# Patient Record
Sex: MALE | Race: WHITE | NOT HISPANIC OR LATINO | Employment: FULL TIME | URBAN - METROPOLITAN AREA
[De-identification: names, ages, dates, MRNs, and addresses within clinical notes are randomized per-mention and may not be internally consistent; named-entity substitution may affect disease eponyms.]

---

## 2018-05-23 ENCOUNTER — OFFICE VISIT (OUTPATIENT)
Dept: FAMILY MEDICINE CLINIC | Facility: CLINIC | Age: 23
End: 2018-05-23
Payer: COMMERCIAL

## 2018-05-23 VITALS
WEIGHT: 129.4 LBS | HEIGHT: 68 IN | DIASTOLIC BLOOD PRESSURE: 62 MMHG | TEMPERATURE: 98.6 F | BODY MASS INDEX: 19.61 KG/M2 | HEART RATE: 72 BPM | SYSTOLIC BLOOD PRESSURE: 94 MMHG

## 2018-05-23 DIAGNOSIS — B37.2 CUTANEOUS CANDIDIASIS: ICD-10-CM

## 2018-05-23 DIAGNOSIS — K21.00 GASTROESOPHAGEAL REFLUX DISEASE WITH ESOPHAGITIS: Primary | ICD-10-CM

## 2018-05-23 DIAGNOSIS — R49.0 HOARSENESS: ICD-10-CM

## 2018-05-23 DIAGNOSIS — K64.4 EXTERNAL HEMORRHOIDS: ICD-10-CM

## 2018-05-23 PROCEDURE — 99203 OFFICE O/P NEW LOW 30 MIN: CPT | Performed by: FAMILY MEDICINE

## 2018-05-23 PROCEDURE — 3008F BODY MASS INDEX DOCD: CPT | Performed by: FAMILY MEDICINE

## 2018-05-23 RX ORDER — PANTOPRAZOLE SODIUM 40 MG/1
40 TABLET, DELAYED RELEASE ORAL DAILY
Qty: 30 TABLET | Refills: 1 | Status: SHIPPED | OUTPATIENT
Start: 2018-05-23 | End: 2019-07-03 | Stop reason: ALTCHOICE

## 2018-05-23 RX ORDER — CLOTRIMAZOLE AND BETAMETHASONE DIPROPIONATE 10; .64 MG/G; MG/G
CREAM TOPICAL 2 TIMES DAILY
Qty: 60 G | Refills: 3 | Status: SHIPPED | OUTPATIENT
Start: 2018-05-23 | End: 2019-07-03 | Stop reason: ALTCHOICE

## 2018-05-23 NOTE — PROGRESS NOTES
Patient ID: Nelly Heaton is a 26 y o  male  HPI: 26 y o male presents to get establishe  He has been experiencing daily acid reflux for past 3 mos and does complain of some intermittent horaseness  He has a red, itchy rash under right axilla and  an irritated external hemorrhoid without any pain or blood  SUBJECTIVE    Family History   Problem Relation Age of Onset    Diabetes Mother     Diabetes Maternal Grandmother      Social History     Social History    Marital status: Single     Spouse name: N/A    Number of children: N/A    Years of education: N/A     Occupational History    Not on file  Social History Main Topics    Smoking status: Current Every Day Smoker    Smokeless tobacco: Not on file      Comment: daily use of cigars    Alcohol use Yes    Drug use: Yes     Types: Marijuana    Sexual activity: Not on file     Other Topics Concern    Not on file     Social History Narrative    No narrative on file     History reviewed  No pertinent past medical history  Past Surgical History:   Procedure Laterality Date    WISDOM TOOTH EXTRACTION       No Known Allergies  No current outpatient prescriptions on file      Review of Systems  Constitutional:     Denies fever, chills ,fatigue ,weakness ,weight loss, weight gain     ENT: Denies earache ,loss of hearing ,nosebleed, nasal discharge,nasal congestion ,sore throat +,hoarseness  Pulmonary: Denies shortness of breath ,cough  ,dyspnea on exertion, orthopnea  ,PND   Cardiovascular:  Denies bradycardia , tachycardia  ,palpations, lower extremity edema leg, claudication  Breast:  Denies new or changing breast lumps ,nipple discharge ,nipple changes  Abdomen:  Denies abdominal pain , anorexia , indigestion, nausea, vomiting, constipation, diarrhea+ acid reflux daily; + irritated externeral hemorrhoid  Musculoskeletal: Denies myalgias, arthralgias, joint swelling, joint stiffness , limb pain, limb swelling  Gu: denies dysuria, polyuria  Skin: Itchy right axillaryskin rash, skin lesion, skin wound, itching, dry skin  Neuro: Denies headache, numbness, tingling, confusion, loss of consciousness, dizziness, vertigo  Psychiatric: Denies feelings of depression, suicidal ideation, anxiety, sleep disturbances    OBJECTIVE    Constitutional:   NAD, well appearing and well nourished      ENT:   Conjunctiva and lids: no injection, edema, or discharge     Pupils and iris: KUNAL bilaterally    External inspection of ears and nose: normal without deformities or discharge  Otoscopic exam: Canals patent without erythema  Nasal mucosa, septum and turbinates: Normal or edema or discharge         Oropharynx:  Moist mucosa, normal tongue and tonsils without lesions  No erythema        Pulmonary:Respiratory effort normal rate and rhythm, no increased work of breathing  Auscultation of lungs:  Clear bilaterally with no adventitious breath sounds       Cardiovascular: regular rate and rhythm, S1 and S2, no murmur, no edema and/or varicosities of LE      Abdomen: Soft and non-distended     Positive bowel sounds      No heptomegaly or splenomegaly  ; small external hemorroid at the 6:00 position    Gu: no suprapubic tenderness or CVA tenderness, no urethral discharge  Lymphatic:  No anterior or posterior cervical lymphadenopathy         Musculoskeletal:  Gait and station: Normal gait      Digits and nails normal without clubbing or cyanosis       Inspection/palpation of joints, bones, and muscles:  No joint tenderness, swelling, full active and passive range of motion       Skin: Normal skin turgor and right erythematous macerated axillar rash     Neuro:      Normal reflexes       Psych:   alert and oriented to person, place and time     normal mood and affect       Assessment/Plan:Diagnoses and all orders for this visit:    Gastroesophageal reflux disease with esophagitis  -     pantoprazole (PROTONIX) 40 mg tablet;  Take 1 tablet (40 mg total) by mouth daily for 30 days    Cutaneous candidiasis  -     clotrimazole-betamethasone (LOTRISONE) 1-0 05 % cream; Apply topically 2 (two) times a day    External hemorrhoids  -     pramoxine (PROCTOFOAM) 1 % foam; Insert 1 application into the rectum every 4 (four) hours as needed for hemorrhoids    Hoarseness  -     Ambulatory Referral to Otolaryngology; Future        Reviewed with patient plan to treat with as above      Patient instructed to call in 72 hours if not feeling better or if symptoms worsen

## 2018-05-24 ENCOUNTER — TELEPHONE (OUTPATIENT)
Dept: FAMILY MEDICINE CLINIC | Facility: CLINIC | Age: 23
End: 2018-05-24

## 2019-07-03 ENCOUNTER — OFFICE VISIT (OUTPATIENT)
Dept: FAMILY MEDICINE CLINIC | Facility: CLINIC | Age: 24
End: 2019-07-03

## 2019-07-03 VITALS
HEIGHT: 69 IN | BODY MASS INDEX: 18.16 KG/M2 | DIASTOLIC BLOOD PRESSURE: 70 MMHG | TEMPERATURE: 98.1 F | SYSTOLIC BLOOD PRESSURE: 108 MMHG | WEIGHT: 122.6 LBS | HEART RATE: 72 BPM

## 2019-07-03 DIAGNOSIS — N32.81 OVERACTIVE BLADDER: ICD-10-CM

## 2019-07-03 DIAGNOSIS — L50.9 URTICARIA: ICD-10-CM

## 2019-07-03 DIAGNOSIS — K64.4 EXTERNAL HEMORRHOIDS: ICD-10-CM

## 2019-07-03 DIAGNOSIS — K21.00 GASTROESOPHAGEAL REFLUX DISEASE WITH ESOPHAGITIS: Primary | ICD-10-CM

## 2019-07-03 PROCEDURE — 99214 OFFICE O/P EST MOD 30 MIN: CPT | Performed by: FAMILY MEDICINE

## 2019-07-03 RX ORDER — TOLTERODINE 4 MG/1
4 CAPSULE, EXTENDED RELEASE ORAL DAILY
Qty: 30 CAPSULE | Refills: 1 | Status: SHIPPED | OUTPATIENT
Start: 2019-07-03 | End: 2019-09-13 | Stop reason: SDUPTHER

## 2019-07-03 RX ORDER — PANTOPRAZOLE SODIUM 40 MG/1
40 TABLET, DELAYED RELEASE ORAL
Qty: 30 TABLET | Refills: 5 | Status: SHIPPED | OUTPATIENT
Start: 2019-07-03 | End: 2020-01-15

## 2019-07-03 NOTE — PROGRESS NOTES
BMI Counseling: Body mass index is 18 1 kg/m²  Discussed the patient's BMI with him  The BMI is above average  BMI counseling and education was provided to the patient  Nutrition recommendations include reducing portion sizes and decreasing overall calorie intake  Patient ID: Cecelia Gan is a 27 y o  male  HPI: 27 y o male presenting withdaily acid reflux despite trying pepcid and otc prilosec  He denies any black or red bowel movements  He also needs a cream for external hemorrhoids  In addition he spontaneously breaks out in hives and would like to take something to stop this from happening  He also complains of chronic urinary frequency and urgency  Often he gets up 6 times per night to urinate  He denies any flank pain  Or dysuria  SUBJECTIVE    Family History   Problem Relation Age of Onset    Diabetes Mother     Diabetes Maternal Grandmother      Social History     Socioeconomic History    Marital status: Single     Spouse name: Not on file    Number of children: Not on file    Years of education: Not on file    Highest education level: Not on file   Occupational History    Not on file   Social Needs    Financial resource strain: Not on file    Food insecurity:     Worry: Not on file     Inability: Not on file    Transportation needs:     Medical: Not on file     Non-medical: Not on file   Tobacco Use    Smoking status: Current Every Day Smoker    Smokeless tobacco: Never Used    Tobacco comment: daily use of cigars   Substance and Sexual Activity    Alcohol use:  Yes    Drug use: Yes     Types: Marijuana    Sexual activity: Not on file   Lifestyle    Physical activity:     Days per week: Not on file     Minutes per session: Not on file    Stress: Not on file   Relationships    Social connections:     Talks on phone: Not on file     Gets together: Not on file     Attends [employer] service: Not on file     Active member of club or organization: Not on file     Attends meetings of clubs or organizations: Not on file     Relationship status: Not on file    Intimate partner violence:     Fear of current or ex partner: Not on file     Emotionally abused: Not on file     Physically abused: Not on file     Forced sexual activity: Not on file   Other Topics Concern    Not on file   Social History Narrative    Not on file     No past medical history on file    Past Surgical History:   Procedure Laterality Date    WISDOM TOOTH EXTRACTION       No Known Allergies    Current Outpatient Medications:     Hydrocortisone 1 % CREA, Insert into the rectum 3 (three) times a day as needed (hemorrhoidal irritation) for up to 10 days, Disp: 28 4 g, Rfl: 3    pantoprazole (PROTONIX) 40 mg tablet, Take 1 tablet (40 mg total) by mouth daily before breakfast, Disp: 30 tablet, Rfl: 5    tolterodine (DETROL LA) 4 mg 24 hr capsule, Take 1 capsule (4 mg total) by mouth daily, Disp: 30 capsule, Rfl: 1    Review of Systems  Constitutional:     Denies fever, chills ,fatigue ,weakness ,weight loss, weight gain     ENT: Denies earache ,loss of hearing ,nosebleed, nasal discharge,nasal congestion ,sore throat ,hoarseness  Pulmonary: Denies shortness of breath ,cough  ,dyspnea on exertion, orthopnea  ,PND   Cardiovascular:  Denies bradycardia , tachycardia  ,palpations, lower extremity edema leg, claudication  Breast:  Denies new or changing breast lumps ,nipple discharge ,nipple changes  Abdomen:  Denies abdominal pain , anorexia , indigestion, nausea, vomiting, constipation, diarrhea+GERD+ external hemorrhoids  Musculoskeletal: Denies myalgias, arthralgias, joint swelling, joint stiffness , limb pain, limb swelling+ frequent urticaria  Gu: denies dysuria,+urgency and  polyuria  Skin: Denies skin rash, skin lesion, skin wound, itching, dry skin+ intermittent urticaria  Neuro: Denies headache, numbness, tingling, confusion, loss of consciousness, dizziness, vertigo  Psychiatric: Denies feelings of depression, suicidal ideation, anxiety, sleep disturbances    OBJECTIVE  /70   Pulse 72   Temp 98 1 °F (36 7 °C)   Ht 5' 9" (1 753 m)   Wt 55 6 kg (122 lb 9 6 oz)   BMI 18 10 kg/m²   Constitutional:   NAD, well appearing and well nourished      ENT:   Conjunctiva and lids: no injection, edema, or discharge     Pupils and iris: KUNAL bilaterally    External inspection of ears and nose: normal without deformities or discharge  Otoscopic exam: Canals patent without erythema  Nasal mucosa, septum and turbinates: Normal or edema or discharge         Oropharynx:  Moist mucosa, normal tongue and tonsils without lesions  No erythema        Pulmonary:Respiratory effort normal rate and rhythm, no increased work of breathing  Auscultation of lungs:  Clear bilaterally with no adventitious breath sounds       Cardiovascular: regular rate and rhythm, S1 and S2, no murmur, no edema and/or varicosities of LE      Abdomen: Soft and non-distended     Positive bowel sounds      No heptomegaly or splenomegaly      Gu: no suprapubic tenderness or CVA tenderness, no urethral discharge  Lymphatic:  No anterior or posterior cervical lymphadenopathy         Musculoskeletal:  Gait and station: Normal gait      Digits and nails normal without clubbing or cyanosis       Inspection/palpation of joints, bones, and muscles:  No joint tenderness, swelling, full active and passive range of motion       Skin: Normal skin turgor and no rashes      Neuro:      Normal reflexes      Psych:   alert and oriented to person, place and time     normal mood and affect       Assessment/Plan:Diagnoses and all orders for this visit:    Gastroesophageal reflux disease with esophagitis  -     pantoprazole (PROTONIX) 40 mg tablet; Take 1 tablet (40 mg total) by mouth daily before breakfast    Urticaria    Overactive bladder  -     tolterodine (DETROL LA) 4 mg 24 hr capsule;  Take 1 capsule (4 mg total) by mouth daily    External hemorrhoids  - Hydrocortisone 1 % CREA; Insert into the rectum 3 (three) times a day as needed (hemorrhoidal irritation) for up to 10 days        Reviewed with patient plan to treat with above plan     Patient instructed to call in 72 hours if not feeling better or if symptoms worsen

## 2019-09-13 DIAGNOSIS — N32.81 OVERACTIVE BLADDER: ICD-10-CM

## 2019-09-13 RX ORDER — TOLTERODINE 4 MG/1
CAPSULE, EXTENDED RELEASE ORAL
Qty: 30 CAPSULE | Refills: 1 | Status: SHIPPED | OUTPATIENT
Start: 2019-09-13 | End: 2019-11-29 | Stop reason: SDUPTHER

## 2019-11-29 DIAGNOSIS — N32.81 OVERACTIVE BLADDER: ICD-10-CM

## 2019-11-29 RX ORDER — TOLTERODINE 4 MG/1
CAPSULE, EXTENDED RELEASE ORAL
Qty: 30 CAPSULE | Refills: 1 | Status: SHIPPED | OUTPATIENT
Start: 2019-11-29 | End: 2021-01-13 | Stop reason: ALTCHOICE

## 2020-01-03 ENCOUNTER — TELEPHONE (OUTPATIENT)
Dept: UROLOGY | Facility: MEDICAL CENTER | Age: 25
End: 2020-01-03

## 2020-01-03 ENCOUNTER — DOCUMENTATION (OUTPATIENT)
Dept: UROLOGY | Facility: MEDICAL CENTER | Age: 25
End: 2020-01-03

## 2020-01-03 ENCOUNTER — OFFICE VISIT (OUTPATIENT)
Dept: UROLOGY | Facility: MEDICAL CENTER | Age: 25
End: 2020-01-03
Payer: COMMERCIAL

## 2020-01-03 VITALS
HEIGHT: 69 IN | SYSTOLIC BLOOD PRESSURE: 124 MMHG | WEIGHT: 121 LBS | BODY MASS INDEX: 17.92 KG/M2 | DIASTOLIC BLOOD PRESSURE: 80 MMHG

## 2020-01-03 DIAGNOSIS — R30.0 DYSURIA: Primary | ICD-10-CM

## 2020-01-03 LAB
SL AMB  POCT GLUCOSE, UA: NORMAL
SL AMB LEUKOCYTE ESTERASE,UA: NORMAL
SL AMB POCT BILIRUBIN,UA: NORMAL
SL AMB POCT BLOOD,UA: NORMAL
SL AMB POCT CLARITY,UA: CLEAR
SL AMB POCT COLOR,UA: YELLOW
SL AMB POCT KETONES,UA: NORMAL
SL AMB POCT NITRITE,UA: NORMAL
SL AMB POCT PH,UA: 8.5
SL AMB POCT SPECIFIC GRAVITY,UA: 1.02
SL AMB POCT URINE PROTEIN: NORMAL
SL AMB POCT UROBILINOGEN: 0.2

## 2020-01-03 PROCEDURE — 99204 OFFICE O/P NEW MOD 45 MIN: CPT | Performed by: UROLOGY

## 2020-01-03 PROCEDURE — 3008F BODY MASS INDEX DOCD: CPT | Performed by: UROLOGY

## 2020-01-03 PROCEDURE — 81003 URINALYSIS AUTO W/O SCOPE: CPT | Performed by: UROLOGY

## 2020-01-03 RX ORDER — CETIRIZINE HYDROCHLORIDE 10 MG/1
10 TABLET ORAL DAILY
COMMUNITY
End: 2022-04-15 | Stop reason: SDUPTHER

## 2020-01-03 NOTE — PROGRESS NOTES
HISTORY:    Evaluation for 16-year-old young man who has had a for I would of urinary frequency complaints for at least two years now  Recent issue is slight dysuria about five weeks ago the just last one day  Then 10 days ago, severe dysuria which last for days in days  Went to urgent care at [facility], culture was negative, renal and bladder ultrasound was negative, given cephalexin which did not really seem to change much  Then, three days ago the burning at completely resolved and he is not having that at all now  Has been on tolterodine for urinary frequency for over a year, he says he is not sure how much it helps  Has some days very goes every 30-60 minutes, nocturia times 2-4 sometimes  Says he uses marijuana every day to help me sleep    Was using Xanax very often during college, he says that made him not urinate very well  ASSESSMENT / PLAN:    1  Unclear cause of frequency and the recent dysuria  Symptoms seem to clear up quite quickly a few days ago  2  Emptying well on ultrasound, unclear cause of his sense of needing to urinate quite often    3  I do not know if the daily marijuana is contribute to his problem, but he does seem to have a reliance on recreational meds  4  Because the ultrasound is normal, his bladder has good emptying, and the negative culture, I do not think he needs any further urology evaluation  I do not think cystoscopy would be of any value here  If the tolterodine seems to help him, he can continue that  Follow-up p r n  The following portions of the patient's history were reviewed and updated as appropriate: allergies, current medications, past family history, past medical history, past social history, past surgical history and problem list     Review of Systems   All other systems reviewed and are negative  Objective:     Physical Exam   Constitutional: He appears well-developed and well-nourished     Genitourinary:   Genitourinary Comments: Penis testes normal    Abdomen soft nontender         No results found for: PSA]  No results found for: BUN  No results found for: CREATININE  No components found for: CBC      There is no problem list on file for this patient  Diagnoses and all orders for this visit:    Dysuria  -     POCT urine dip auto non-scope    Other orders  -     cetirizine (ZyrTEC) 10 mg tablet; Take 10 mg by mouth daily           Patient ID: Willie Nunez is a 27 y o  male  Current Outpatient Medications:     cetirizine (ZyrTEC) 10 mg tablet, Take 10 mg by mouth daily, Disp: , Rfl:     Hydrocortisone 1 % CREA, Insert into the rectum 3 (three) times a day as needed (hemorrhoidal irritation) for up to 10 days, Disp: 28 4 g, Rfl: 3    pantoprazole (PROTONIX) 40 mg tablet, Take 1 tablet (40 mg total) by mouth daily before breakfast, Disp: 30 tablet, Rfl: 5    tolterodine (DETROL LA) 4 mg 24 hr capsule, take 1 capsule by mouth daily, Disp: 30 capsule, Rfl: 1    History reviewed  No pertinent past medical history      Past Surgical History:   Procedure Laterality Date    WISDOM TOOTH EXTRACTION         Social History

## 2020-01-15 DIAGNOSIS — K21.00 GASTROESOPHAGEAL REFLUX DISEASE WITH ESOPHAGITIS: ICD-10-CM

## 2020-01-15 RX ORDER — PANTOPRAZOLE SODIUM 40 MG/1
TABLET, DELAYED RELEASE ORAL
Qty: 30 TABLET | Refills: 0 | Status: SHIPPED | OUTPATIENT
Start: 2020-01-15 | End: 2020-02-20

## 2020-02-19 DIAGNOSIS — K21.00 GASTROESOPHAGEAL REFLUX DISEASE WITH ESOPHAGITIS: ICD-10-CM

## 2020-02-20 RX ORDER — PANTOPRAZOLE SODIUM 40 MG/1
TABLET, DELAYED RELEASE ORAL
Qty: 30 TABLET | Refills: 0 | Status: SHIPPED | OUTPATIENT
Start: 2020-02-20 | End: 2020-04-23

## 2020-04-23 DIAGNOSIS — K21.00 GASTROESOPHAGEAL REFLUX DISEASE WITH ESOPHAGITIS: ICD-10-CM

## 2020-04-23 RX ORDER — PANTOPRAZOLE SODIUM 40 MG/1
TABLET, DELAYED RELEASE ORAL
Qty: 30 TABLET | Refills: 0 | Status: SHIPPED | OUTPATIENT
Start: 2020-04-23 | End: 2020-06-03

## 2020-06-03 DIAGNOSIS — K21.00 GASTROESOPHAGEAL REFLUX DISEASE WITH ESOPHAGITIS: ICD-10-CM

## 2020-06-03 RX ORDER — PANTOPRAZOLE SODIUM 40 MG/1
TABLET, DELAYED RELEASE ORAL
Qty: 30 TABLET | Refills: 0 | Status: SHIPPED | OUTPATIENT
Start: 2020-06-03 | End: 2021-01-13 | Stop reason: SDUPTHER

## 2021-01-13 ENCOUNTER — OFFICE VISIT (OUTPATIENT)
Dept: FAMILY MEDICINE CLINIC | Facility: CLINIC | Age: 26
End: 2021-01-13
Payer: COMMERCIAL

## 2021-01-13 ENCOUNTER — TELEPHONE (OUTPATIENT)
Dept: FAMILY MEDICINE CLINIC | Facility: CLINIC | Age: 26
End: 2021-01-13

## 2021-01-13 VITALS
BODY MASS INDEX: 19.11 KG/M2 | SYSTOLIC BLOOD PRESSURE: 122 MMHG | HEART RATE: 64 BPM | HEIGHT: 69 IN | DIASTOLIC BLOOD PRESSURE: 80 MMHG | WEIGHT: 129 LBS | OXYGEN SATURATION: 99 % | TEMPERATURE: 98.5 F

## 2021-01-13 DIAGNOSIS — R35.89 POLYURIA: ICD-10-CM

## 2021-01-13 DIAGNOSIS — M25.562 ACUTE PAIN OF BOTH KNEES: ICD-10-CM

## 2021-01-13 DIAGNOSIS — M76.30 ILIOTIBIAL BAND SYNDROME, UNSPECIFIED LATERALITY: ICD-10-CM

## 2021-01-13 DIAGNOSIS — K21.00 GASTROESOPHAGEAL REFLUX DISEASE WITH ESOPHAGITIS: ICD-10-CM

## 2021-01-13 DIAGNOSIS — Z00.00 ANNUAL PHYSICAL EXAM: Primary | ICD-10-CM

## 2021-01-13 DIAGNOSIS — N32.81 OVERACTIVE BLADDER: ICD-10-CM

## 2021-01-13 DIAGNOSIS — K64.4 EXTERNAL HEMORRHOID: ICD-10-CM

## 2021-01-13 DIAGNOSIS — M25.561 ACUTE PAIN OF BOTH KNEES: ICD-10-CM

## 2021-01-13 LAB
SL AMB  POCT GLUCOSE, UA: NEGATIVE
SL AMB LEUKOCYTE ESTERASE,UA: NEGATIVE
SL AMB POCT BILIRUBIN,UA: NEGATIVE
SL AMB POCT BLOOD,UA: NEGATIVE
SL AMB POCT CLARITY,UA: CLEAR
SL AMB POCT COLOR,UA: YELLOW
SL AMB POCT KETONES,UA: NEGATIVE
SL AMB POCT NITRITE,UA: NEGATIVE
SL AMB POCT PH,UA: 5
SL AMB POCT SPECIFIC GRAVITY,UA: 1.02
SL AMB POCT URINE PROTEIN: NEGATIVE
SL AMB POCT UROBILINOGEN: 0.2

## 2021-01-13 PROCEDURE — 99395 PREV VISIT EST AGE 18-39: CPT | Performed by: FAMILY MEDICINE

## 2021-01-13 PROCEDURE — 3008F BODY MASS INDEX DOCD: CPT | Performed by: FAMILY MEDICINE

## 2021-01-13 PROCEDURE — 99215 OFFICE O/P EST HI 40 MIN: CPT | Performed by: FAMILY MEDICINE

## 2021-01-13 PROCEDURE — 3725F SCREEN DEPRESSION PERFORMED: CPT | Performed by: FAMILY MEDICINE

## 2021-01-13 PROCEDURE — 1036F TOBACCO NON-USER: CPT | Performed by: FAMILY MEDICINE

## 2021-01-13 PROCEDURE — 81002 URINALYSIS NONAUTO W/O SCOPE: CPT | Performed by: FAMILY MEDICINE

## 2021-01-13 RX ORDER — PANTOPRAZOLE SODIUM 40 MG/1
40 TABLET, DELAYED RELEASE ORAL DAILY
Qty: 90 TABLET | Refills: 3 | Status: SHIPPED | OUTPATIENT
Start: 2021-01-13 | End: 2022-01-10

## 2021-01-13 RX ORDER — DIAPER,BRIEF,INFANT-TODD,DISP
EACH MISCELLANEOUS 3 TIMES DAILY
Qty: 30 G | Refills: 1 | Status: SHIPPED | OUTPATIENT
Start: 2021-01-13 | End: 2021-10-14 | Stop reason: ALTCHOICE

## 2021-01-13 RX ORDER — SOLIFENACIN SUCCINATE 10 MG/1
10 TABLET, FILM COATED ORAL DAILY
Qty: 30 TABLET | Refills: 1 | Status: SHIPPED | OUTPATIENT
Start: 2021-01-13 | End: 2021-01-18

## 2021-01-13 RX ORDER — FAMOTIDINE 40 MG/1
40 TABLET, FILM COATED ORAL
Qty: 90 TABLET | Refills: 2 | Status: SHIPPED | OUTPATIENT
Start: 2021-01-13 | End: 2022-02-04

## 2021-01-13 RX ORDER — PREDNISONE 10 MG/1
TABLET ORAL
Qty: 26 TABLET | Refills: 0 | Status: SHIPPED | OUTPATIENT
Start: 2021-01-13 | End: 2021-10-14 | Stop reason: ALTCHOICE

## 2021-01-13 NOTE — PROGRESS NOTES
ADULT ANNUAL [address]    NAME: Tong Aly  AGE: 23 y o  SEX: male  : 1995     DATE: 2021     Assessment and Plan:     Problem List Items Addressed This Visit     None          Immunizations and preventive care screenings were discussed with patient today  Appropriate education was printed on patient's after visit summary  Counseling:  · Exercise: the importance of regular exercise/physical activity was discussed  Recommend exercise 3-5 times per week for at least 30 minutes  No follow-ups on file  Chief Complaint:     Chief Complaint   Patient presents with    Annual Exam    Knee Pain     right & left knee pain x 1 month    Urinary Frequency    Hemorrhoids    Heartburn      History of Present Illness:     Adult Annual Physical   Patient here for a comprehensive physical exam  The patient reports no problems  Diet and Physical Activity  · Diet/Nutrition: well balanced diet  · Exercise: no formal exercise  Depression Screening  PHQ-9 Depression Screening    PHQ-9:   Frequency of the following problems over the past two weeks:      Little interest or pleasure in doing things: 0 - not at all  Feeling down, depressed, or hopeless: 0 - not at all  PHQ-2 Score: 0       General Health  · Sleep: sleeps well  · Hearing: normal - bilateral   · Vision: no vision problems  · Dental: regular dental visits  [facility]  · History of STDs?: no      Review of Systems:     Review of Systems   Constitutional: Negative for appetite change, chills and fever  HENT: Negative for ear pain, facial swelling, rhinorrhea, sinus pain, sore throat and trouble swallowing  Eyes: Negative for discharge and redness  Respiratory: Negative for chest tightness, shortness of breath and wheezing  Cardiovascular: Negative for chest pain and palpitations     Gastrointestinal: Negative for abdominal pain, diarrhea, nausea and vomiting  Endocrine: Negative for polyuria  Genitourinary: Negative for dysuria and urgency  Musculoskeletal: Negative for arthralgias and back pain  Skin: Negative for rash  Neurological: Negative for dizziness, weakness and headaches  Hematological: Negative for adenopathy  Psychiatric/Behavioral: Negative for behavioral problems, confusion and sleep disturbance  All other systems reviewed and are negative  Past Medical History:     No past medical history on file     Past Surgical History:     Past Surgical History:   Procedure Laterality Date    WISDOM TOOTH EXTRACTION        Social History:        Social History     Socioeconomic History    Marital status: Single     Spouse name: None    Number of children: None    Years of education: None    Highest education level: None   Occupational History    None   Social Needs    Financial resource strain: None    Food insecurity     Worry: None     Inability: None    Transportation needs     Medical: None     Non-medical: None   Tobacco Use    Smoking status: Former Smoker    Smokeless tobacco: Never Used   Substance and Sexual Activity    Alcohol use: Yes     Frequency: 2-4 times a month     Drinks per session: 1 or 2     Binge frequency: Never    Drug use: Yes     Types: Marijuana    Sexual activity: Yes     Partners: Female   Lifestyle    Physical activity     Days per week: None     Minutes per session: None    Stress: None   Relationships    Social connections     Talks on phone: None     Gets together: None     Attends Baptism service: None     Active member of club or organization: None     Attends meetings of clubs or organizations: None     Relationship status: None    Intimate partner violence     Fear of current or ex partner: None     Emotionally abused: None     Physically abused: None     Forced sexual activity: None   Other Topics Concern    None   Social History Narrative    None      Family History: Family History   Problem Relation Age of Onset    Diabetes Mother     Diabetes Maternal Grandmother       Current Medications:     Current Outpatient Medications   Medication Sig Dispense Refill    cetirizine (ZyrTEC) 10 mg tablet Take 10 mg by mouth daily      pantoprazole (PROTONIX) 40 mg tablet take 1 tablet by mouth daily 30 tablet 0     No current facility-administered medications for this visit  Allergies:     No Known Allergies   Physical Exam:     /80   Pulse 64   Temp 98 5 °F (36 9 °C)   Ht 5' 9" (1 753 m)   Wt 58 5 kg (129 lb)   SpO2 99%   BMI 19 05 kg/m²     Physical Exam  Vitals signs and nursing note reviewed  Constitutional:       General: He is not in acute distress  Appearance: Normal appearance  He is well-developed  He is not ill-appearing or diaphoretic  HENT:      Head: Normocephalic and atraumatic  Right Ear: Tympanic membrane, ear canal and external ear normal       Left Ear: Tympanic membrane, ear canal and external ear normal       Nose: Nose normal  No congestion or rhinorrhea  Mouth/Throat:      Mouth: Mucous membranes are moist       Pharynx: Oropharynx is clear  No posterior oropharyngeal erythema  Eyes:      General:         Right eye: No discharge  Left eye: No discharge  Extraocular Movements: Extraocular movements intact  Conjunctiva/sclera: Conjunctivae normal       Pupils: Pupils are equal, round, and reactive to light  Neck:      Musculoskeletal: Normal range of motion and neck supple  No neck rigidity  Vascular: No carotid bruit  Cardiovascular:      Rate and Rhythm: Normal rate and regular rhythm  Pulses: Normal pulses  Heart sounds: Normal heart sounds  No murmur  Pulmonary:      Effort: Pulmonary effort is normal  No respiratory distress  Breath sounds: Normal breath sounds  No wheezing or rhonchi  Abdominal:      General: Abdomen is flat  Bowel sounds are normal  There is no distension  Palpations: Abdomen is soft  There is no mass  Tenderness: There is no abdominal tenderness  Musculoskeletal: Normal range of motion  General: No swelling or deformity  Right lower leg: No edema  Left lower leg: No edema  Lymphadenopathy:      Cervical: No cervical adenopathy  Skin:     General: Skin is warm and dry  Capillary Refill: Capillary refill takes less than 2 seconds  Coloration: Skin is not jaundiced  Findings: No bruising, erythema or rash  Neurological:      General: No focal deficit present  Mental Status: He is alert and oriented to person, place, and time  Cranial Nerves: No cranial nerve deficit  Sensory: No sensory deficit  Gait: Gait normal       Deep Tendon Reflexes: Reflexes normal    Psychiatric:         Mood and Affect: Mood normal          Behavior: Behavior normal          Thought Content:  Thought content normal          Judgment: Judgment normal           Maria Ines [address]

## 2021-01-13 NOTE — TELEPHONE ENCOUNTER
Patient called his insurance company  He spoke to someone there who told him that they don't cover Vesicare, but they aren't allowed to tell him covered alternatives

## 2021-01-13 NOTE — PATIENT INSTRUCTIONS

## 2021-01-18 DIAGNOSIS — N32.81 OVERACTIVE BLADDER: ICD-10-CM

## 2021-01-18 RX ORDER — SOLIFENACIN SUCCINATE 10 MG/1
TABLET, FILM COATED ORAL
Qty: 30 TABLET | Refills: 1 | Status: SHIPPED | OUTPATIENT
Start: 2021-01-18 | End: 2021-01-19

## 2021-01-19 DIAGNOSIS — N32.81 OAB (OVERACTIVE BLADDER): Primary | ICD-10-CM

## 2021-01-19 RX ORDER — OXYBUTYNIN CHLORIDE 15 MG/1
15 TABLET, EXTENDED RELEASE ORAL
Qty: 30 TABLET | Refills: 3 | Status: SHIPPED | OUTPATIENT
Start: 2021-01-19 | End: 2021-10-14 | Stop reason: ALTCHOICE

## 2021-01-19 RX ORDER — SOLIFENACIN SUCCINATE 10 MG/1
TABLET, FILM COATED ORAL
Qty: 30 TABLET | Refills: 1 | Status: SHIPPED | OUTPATIENT
Start: 2021-01-19 | End: 2021-10-14 | Stop reason: ALTCHOICE

## 2021-01-19 NOTE — TELEPHONE ENCOUNTER
Call from [employer] the only other covered alternative is oxybutynin which he already failed on  It will require an authorization   I am going to try and start one on cover my meds

## 2021-01-19 NOTE — TELEPHONE ENCOUNTER
Katie Just so they denied the vesicare and I looked back in his chart  He was on tolterodine, not oxybutynin   He needs to try oxybutynin before they will approve Vesicare

## 2021-01-20 ENCOUNTER — SOCIAL WORK (OUTPATIENT)
Dept: BEHAVIORAL/MENTAL HEALTH CLINIC | Facility: CLINIC | Age: 26
End: 2021-01-20
Payer: COMMERCIAL

## 2021-01-20 DIAGNOSIS — F43.21 ADJUSTMENT DISORDER WITH DEPRESSED MOOD: Primary | ICD-10-CM

## 2021-01-20 PROCEDURE — 90834 PSYTX W PT 45 MINUTES: CPT | Performed by: SOCIAL WORKER

## 2021-01-20 NOTE — PSYCH
Assessment/Plan:      Diagnoses and all orders for this visit:    Adjustment disorder with depressed mood          Subjective:     Patient ID: Gabriella Simmons is a 27 y o  male presenting to this writer with ongoing depressed mood  Pt reports that he has been finding it hard to be motivated and is reporting that he feels on the edge of burnout  Pt reports that he lives with his mother and sister  Both are very supportive  Pt reports that he failed out of [address] after falling into Xanax abuse and Cocaine use- pt has not used these drugs since leaving College 3 years ago  Pt reports his grades were very poor and he was academically failing and unable to continue  Pt reports that he is now studying at [employer]  Pt reports that his grades are better, but he is feeling the burnout factor  Pt reports that he is leaving work and projects to the last minute  Pt also works at [employer] 30 hours a week  Pt states he enjoys the work, but the work commitment has been playing a role in the stress he is experiencing  Pt reports that he is going to consider reducing his hours a little to give him more wiggle room when it comes to assignments  Pt reports that most of the stress he is going through relates to college debt and the pressure of work/ education demands  Pt reports that his primary concern is time wasting and taking a long time to get started on projects  This writer did ADHD screening questions and pt scored 5 out of 6  This writer talked about the indicators of the pecense of ADHD in adults and in particular the disorganization and the difficulty in starting projects  Pt reports he can identify with a lot of the symptoms of ADHD in adults  This writer helped pt understand some of the things that he is going to be struggling with and will have to work extra hard on   This writer discussed with pt the importance of taking baby steps and rather than leaving things to the last minute, start the planning process as soon as an assignment is given and get the project done early rather than last minute and having multiple days of stress  This writer talked with pt about self care, mindfulness and other stress reducing techniques that can be helpful  Pt reports that he would like to come back in 2 weeks depending on cost of co-pay  HPI    Review of Systems      Objective:     Physical Exam  This writer spent 45 minutes with pt from 9am to 9:45  Appearance: casually dressed good eye contact; speech: normal pitch and normal volume; behavior: normal, thought process: logical and goal directed, thought content: denies SI/HI, perceptions: no delusions of AH/VH, mood: slightly anxious, affect: congruent, orientated x4, insight/judgement: good

## 2021-01-21 NOTE — PROGRESS NOTES
Patient ID: Jorge Suarez is a 27 y o  male  HPI: 27 y o male presents for multiple complaints  He has pain laterally on right lateral upper leg from hip to knee and is tender on palpation and going up steps and bending upper leg  He denies any injury  He also has chronic urinary frequency and was diagnosed with OAB in past and failed detrol in past, but wanted to make sure there was no sugar in his urine because his mother is a type 2 diabetic  In addition, he has bilateral knee pain and achiness without swelling, locking or instability  HE has an external hemorrhoid that is irritated and causes leakage /soiling in his underwear but no real pain or bleeding  Lastly, he has been experiencing acid reflux daily    He denies excessive caffiene intake, use of advil, etc       SUBJECTIVE    Family History   Problem Relation Age of Onset    Diabetes Mother     Diabetes Maternal Grandmother      Social History     Socioeconomic History    Marital status: Single     Spouse name: Not on file    Number of children: Not on file    Years of education: Not on file    Highest education level: Not on file   Occupational History    Not on file   Social Needs    Financial resource strain: Not on file    Food insecurity     Worry: Not on file     Inability: Not on file    Transportation needs     Medical: Not on file     Non-medical: Not on file   Tobacco Use    Smoking status: Former Smoker    Smokeless tobacco: Never Used   Substance and Sexual Activity    Alcohol use: Yes     Frequency: 2-4 times a month     Drinks per session: 1 or 2     Binge frequency: Never    Drug use: Yes     Types: Marijuana    Sexual activity: Yes     Partners: Female   Lifestyle    Physical activity     Days per week: Not on file     Minutes per session: Not on file    Stress: Not on file   Relationships    Social connections     Talks on phone: Not on file     Gets together: Not on file     Attends Holiness service: Not on file     Active member of club or organization: Not on file     Attends meetings of clubs or organizations: Not on file     Relationship status: Not on file    Intimate partner violence     Fear of current or ex partner: Not on file     Emotionally abused: Not on file     Physically abused: Not on file     Forced sexual activity: Not on file   Other Topics Concern    Not on file   Social History Narrative    Not on file     No past medical history on file    Past Surgical History:   Procedure Laterality Date    WISDOM TOOTH EXTRACTION       No Known Allergies    Current Outpatient Medications:     cetirizine (ZyrTEC) 10 mg tablet, Take 10 mg by mouth daily, Disp: , Rfl:     pantoprazole (PROTONIX) 40 mg tablet, Take 1 tablet (40 mg total) by mouth daily, Disp: 90 tablet, Rfl: 3    famotidine (PEPCID) 40 MG tablet, Take 1 tablet (40 mg total) by mouth daily at bedtime, Disp: 90 tablet, Rfl: 2    hydrocortisone 1 % cream, Apply topically 3 (three) times a day, Disp: 30 g, Rfl: 1    oxybutynin (DITROPAN XL) 15 MG 24 hr tablet, Take 1 tablet (15 mg total) by mouth daily at bedtime, Disp: 30 tablet, Rfl: 3    predniSONE 10 mg tablet, 3 tabs po bid x2 days, then 2 tabs po bid x2 days, then 1 tab bid x2 days, then 1 daily until done , Disp: 26 tablet, Rfl: 0    solifenacin (VESICARE) 10 MG tablet, TAKE 1 TABLET BY MOUTH ONCE DAILY, Disp: 30 tablet, Rfl: 1    Review of Systems  Constitutional:     Denies fever, chills ,fatigue ,weakness ,weight loss, weight gain     ENT: Denies earache ,loss of hearing ,nosebleed, nasal discharge,nasal congestion ,sore throat ,hoarseness  Pulmonary: Denies shortness of breath ,cough  ,dyspnea on exertion, orthopnea  ,PND   Cardiovascular:  Denies bradycardia , tachycardia  ,palpations, lower extremity edema leg, claudication  Breast:  Denies new or changing breast lumps ,nipple discharge ,nipple changes  Abdomen:  Denies abdominal pain , anorexia , nausea, vomiting, constipation, diarrhea+ external hemorrhoid, daily GERD  Musculoskeletal: Denies myalgias,+ bilateral knee pain and joint stiffness ,no swelling of knees, + right lateral upper leg pain without  limb swelling  Gu: denies dysuria,+ chronic  Polyuria and urgency  Skin: Denies skin rash, skin lesion, skin wound, itching, dry skin  Neuro: Denies headache, numbness, tingling, confusion, loss of consciousness, dizziness, vertigo  Psychiatric: Denies feelings of depression, suicidal ideation, anxiety, sleep disturbances    OBJECTIVE  /80   Pulse 64   Temp 98 5 °F (36 9 °C)   Ht 5' 9" (1 753 m)   Wt 58 5 kg (129 lb)   SpO2 99%   BMI 19 05 kg/m²   Constitutional:   NAD, well appearing and well nourished      ENT:   Conjunctiva and lids: no injection, edema, or discharge     Pupils and iris: KUNAL bilaterally    External inspection of ears and nose: normal without deformities or discharge  Otoscopic exam: Canals patent without erythema  Nasal mucosa, septum and turbinates: Normal or edema or discharge         Oropharynx:  Moist mucosa, normal tongue and tonsils without lesions  No erythema        Pulmonary:Respiratory effort normal rate and rhythm, no increased work of breathing  Auscultation of lungs:  Clear bilaterally with no adventitious breath sounds       Cardiovascular: regular rate and rhythm, S1 and S2, no murmur, no edema and/or varicosities of LE      Abdomen: Soft and non-distended     Positive bowel sounds      No heptomegaly or splenomegaly      Gu: no suprapubic tenderness or CVA tenderness, no urethral discharge  Lymphatic:  No anterior or posterior cervical lymphadenopathy         Musculoskeletal:  Gait and station: Normal gait      Digits and nails normal without clubbing or cyanosis       Inspection/palpation of joints, bones, and muscles:  + tenderness of right lateral thigh along right IT band ; full flexion and extension of knees without crepitace or swelling noted       Skin: Normal skin turgor and no rashes      Neuro:     Normal reflexes     Psych:   alert and oriented to person, place and time     normal mood and affect       Assessment/Plan:Diagnoses and all orders for this visit:    Annual physical exam    Iliotibial band syndrome, unspecified laterality  -     predniSONE 10 mg tablet; 3 tabs po bid x2 days, then 2 tabs po bid x2 days, then 1 tab bid x2 days, then 1 daily until done  Overactive bladder  -     Discontinue: solifenacin (VESICARE) 10 MG tablet; Take 1 tablet (10 mg total) by mouth daily    Polyuria  -     POCT urine dip    Acute pain of both knees  -     XR knee 4+ vw left injury; Future  -     XR knee 4+ vw right injury; Future    External hemorrhoid  -     hydrocortisone 1 % cream; Apply topically 3 (three) times a day    Gastroesophageal reflux disease with esophagitis  -     pantoprazole (PROTONIX) 40 mg tablet; Take 1 tablet (40 mg total) by mouth daily  -     famotidine (PEPCID) 40 MG tablet; Take 1 tablet (40 mg total) by mouth daily at bedtime        Reviewed with patient plan to treat with aboveplan      Patient instructed to call in 72 hours if not feeling better or if symptoms worsen

## 2021-01-27 ENCOUNTER — HOSPITAL ENCOUNTER (OUTPATIENT)
Dept: RADIOLOGY | Facility: HOSPITAL | Age: 26
Discharge: HOME | End: 2021-01-27
Payer: COMMERCIAL

## 2021-01-27 DIAGNOSIS — M25.561 ACUTE PAIN OF BOTH KNEES: ICD-10-CM

## 2021-01-27 DIAGNOSIS — M25.562 ACUTE PAIN OF BOTH KNEES: ICD-10-CM

## 2021-01-27 PROCEDURE — 73564 X-RAY EXAM KNEE 4 OR MORE: CPT

## 2021-05-06 ENCOUNTER — IMMUNIZATIONS (OUTPATIENT)
Dept: FAMILY MEDICINE CLINIC | Facility: HOSPITAL | Age: 26
End: 2021-05-06

## 2021-05-06 DIAGNOSIS — Z23 ENCOUNTER FOR IMMUNIZATION: Primary | ICD-10-CM

## 2021-05-06 PROCEDURE — 0001A SARS-COV-2 / COVID-19 MRNA VACCINE (PFIZER-BIONTECH) 30 MCG: CPT

## 2021-05-06 PROCEDURE — 91300 SARS-COV-2 / COVID-19 MRNA VACCINE (PFIZER-BIONTECH) 30 MCG: CPT

## 2021-06-02 ENCOUNTER — IMMUNIZATIONS (OUTPATIENT)
Dept: FAMILY MEDICINE CLINIC | Facility: HOSPITAL | Age: 26
End: 2021-06-02

## 2021-06-02 DIAGNOSIS — Z23 ENCOUNTER FOR IMMUNIZATION: Primary | ICD-10-CM

## 2021-06-02 PROCEDURE — 0002A SARS-COV-2 / COVID-19 MRNA VACCINE (PFIZER-BIONTECH) 30 MCG: CPT

## 2021-06-02 PROCEDURE — 91300 SARS-COV-2 / COVID-19 MRNA VACCINE (PFIZER-BIONTECH) 30 MCG: CPT

## 2021-10-10 ENCOUNTER — APPOINTMENT (EMERGENCY)
Dept: RADIOLOGY | Facility: HOSPITAL | Age: 26
End: 2021-10-10
Payer: COMMERCIAL

## 2021-10-10 ENCOUNTER — HOSPITAL ENCOUNTER (EMERGENCY)
Facility: HOSPITAL | Age: 26
Discharge: HOME | End: 2021-10-10
Payer: COMMERCIAL

## 2021-10-10 VITALS
BODY MASS INDEX: 17.71 KG/M2 | WEIGHT: 119.6 LBS | HEIGHT: 69 IN | HEART RATE: 64 BPM | RESPIRATION RATE: 14 BRPM | OXYGEN SATURATION: 98 % | DIASTOLIC BLOOD PRESSURE: 72 MMHG | TEMPERATURE: 98.1 F | SYSTOLIC BLOOD PRESSURE: 120 MMHG

## 2021-10-10 DIAGNOSIS — R55 SYNCOPE, NEAR: Primary | ICD-10-CM

## 2021-10-10 LAB
ALBUMIN SERPL BCP-MCNC: 4.7 G/DL (ref 3.4–4.8)
ALP SERPL-CCNC: 55.5 U/L (ref 10–129)
ALT SERPL W P-5'-P-CCNC: 15 U/L (ref 5–63)
ANION GAP SERPL CALCULATED.3IONS-SCNC: 7 MMOL/L (ref 4–13)
AST SERPL W P-5'-P-CCNC: 18 U/L (ref 15–41)
BASOPHILS # BLD AUTO: 0.02 THOUSANDS/ΜL (ref 0–0.1)
BASOPHILS NFR BLD AUTO: 1 % (ref 0–1)
BILIRUB SERPL-MCNC: 0.32 MG/DL (ref 0.3–1.2)
BUN SERPL-MCNC: 7 MG/DL (ref 6–20)
CALCIUM SERPL-MCNC: 9.6 MG/DL (ref 8.4–10.2)
CHLORIDE SERPL-SCNC: 102 MMOL/L (ref 96–108)
CO2 SERPL-SCNC: 31 MMOL/L (ref 22–33)
CREAT SERPL-MCNC: 0.86 MG/DL (ref 0.5–1.2)
EOSINOPHIL # BLD AUTO: 0.02 THOUSAND/ΜL (ref 0–0.61)
EOSINOPHIL NFR BLD AUTO: 1 % (ref 0–6)
ERYTHROCYTE [DISTWIDTH] IN BLOOD BY AUTOMATED COUNT: 11.9 % (ref 11.6–15.1)
GFR SERPL CREATININE-BSD FRML MDRD: 121 ML/MIN/1.73SQ M
GLUCOSE SERPL-MCNC: 87 MG/DL (ref 65–140)
HCT VFR BLD AUTO: 45.3 % (ref 36.5–49.3)
HGB BLD-MCNC: 15.1 G/DL (ref 12–17)
IMM GRANULOCYTES # BLD AUTO: 0.01 THOUSAND/UL (ref 0–0.2)
IMM GRANULOCYTES NFR BLD AUTO: 0 % (ref 0–2)
LYMPHOCYTES # BLD AUTO: 1.16 THOUSANDS/ΜL (ref 0.6–4.47)
LYMPHOCYTES NFR BLD AUTO: 31 % (ref 14–44)
MCH RBC QN AUTO: 31.5 PG (ref 26.8–34.3)
MCHC RBC AUTO-ENTMCNC: 33.3 G/DL (ref 31.4–37.4)
MCV RBC AUTO: 94 FL (ref 82–98)
MONOCYTES # BLD AUTO: 0.63 THOUSAND/ΜL (ref 0.17–1.22)
MONOCYTES NFR BLD AUTO: 17 % (ref 4–12)
NEUTROPHILS # BLD AUTO: 1.87 THOUSANDS/ΜL (ref 1.85–7.62)
NEUTS SEG NFR BLD AUTO: 50 % (ref 43–75)
PLATELET # BLD AUTO: 200 THOUSANDS/UL (ref 149–390)
PMV BLD AUTO: 10.5 FL (ref 8.9–12.7)
POTASSIUM SERPL-SCNC: 3.9 MMOL/L (ref 3.5–5)
PROT SERPL-MCNC: 7.7 G/DL (ref 6.4–8.3)
RBC # BLD AUTO: 4.8 MILLION/UL (ref 3.88–5.62)
SARS-COV-2 RNA RESP QL NAA+PROBE: NEGATIVE
SODIUM SERPL-SCNC: 140 MMOL/L (ref 133–145)
TROPONIN I SERPL-MCNC: <0.03 NG/ML (ref 0–0.07)
WBC # BLD AUTO: 3.71 THOUSAND/UL (ref 4.31–10.16)

## 2021-10-10 PROCEDURE — 80053 COMPREHEN METABOLIC PANEL: CPT

## 2021-10-10 PROCEDURE — 93005 ELECTROCARDIOGRAM TRACING: CPT

## 2021-10-10 PROCEDURE — 36415 COLL VENOUS BLD VENIPUNCTURE: CPT

## 2021-10-10 PROCEDURE — 99285 EMERGENCY DEPT VISIT HI MDM: CPT

## 2021-10-10 PROCEDURE — 71045 X-RAY EXAM CHEST 1 VIEW: CPT

## 2021-10-10 PROCEDURE — 85025 COMPLETE CBC W/AUTO DIFF WBC: CPT

## 2021-10-10 PROCEDURE — 84484 ASSAY OF TROPONIN QUANT: CPT

## 2021-10-10 PROCEDURE — 87635 SARS-COV-2 COVID-19 AMP PRB: CPT

## 2021-10-11 LAB
ATRIAL RATE: 75 BPM
P AXIS: 70 DEGREES
PR INTERVAL: 134 MS
QRS AXIS: 50 DEGREES
QRSD INTERVAL: 92 MS
QT INTERVAL: 356 MS
QTC INTERVAL: 401 MS
T WAVE AXIS: 67 DEGREES
VENTRICULAR RATE: 76 BPM

## 2021-10-11 PROCEDURE — 93010 ELECTROCARDIOGRAM REPORT: CPT | Performed by: INTERNAL MEDICINE

## 2021-10-14 ENCOUNTER — OFFICE VISIT (OUTPATIENT)
Dept: FAMILY MEDICINE CLINIC | Facility: CLINIC | Age: 26
End: 2021-10-14
Payer: COMMERCIAL

## 2021-10-14 VITALS
DIASTOLIC BLOOD PRESSURE: 62 MMHG | WEIGHT: 120 LBS | HEIGHT: 69 IN | HEART RATE: 62 BPM | TEMPERATURE: 98.5 F | SYSTOLIC BLOOD PRESSURE: 110 MMHG | BODY MASS INDEX: 17.77 KG/M2

## 2021-10-14 DIAGNOSIS — F41.9 ANXIETY: ICD-10-CM

## 2021-10-14 DIAGNOSIS — G47.00 INSOMNIA, UNSPECIFIED TYPE: Primary | ICD-10-CM

## 2021-10-14 DIAGNOSIS — R35.0 URINARY FREQUENCY: ICD-10-CM

## 2021-10-14 PROCEDURE — 1036F TOBACCO NON-USER: CPT | Performed by: FAMILY MEDICINE

## 2021-10-14 PROCEDURE — 99214 OFFICE O/P EST MOD 30 MIN: CPT | Performed by: FAMILY MEDICINE

## 2021-10-14 PROCEDURE — 3008F BODY MASS INDEX DOCD: CPT | Performed by: FAMILY MEDICINE

## 2021-10-14 RX ORDER — ESCITALOPRAM OXALATE 10 MG/1
10 TABLET ORAL DAILY
Qty: 30 TABLET | Refills: 5 | Status: SHIPPED | OUTPATIENT
Start: 2021-10-14 | End: 2021-12-22 | Stop reason: SDUPTHER

## 2021-10-14 RX ORDER — ZOLPIDEM TARTRATE 12.5 MG/1
12.5 TABLET, FILM COATED, EXTENDED RELEASE ORAL
Qty: 30 TABLET | Refills: 0 | Status: SHIPPED | OUTPATIENT
Start: 2021-10-14 | End: 2021-10-14

## 2021-11-16 DIAGNOSIS — G47.00 INSOMNIA, UNSPECIFIED TYPE: ICD-10-CM

## 2021-11-17 RX ORDER — ZOLPIDEM TARTRATE 10 MG/1
TABLET ORAL
Qty: 30 TABLET | Refills: 3 | Status: SHIPPED | OUTPATIENT
Start: 2021-11-17 | End: 2022-03-14 | Stop reason: SDUPTHER

## 2021-12-22 DIAGNOSIS — F41.9 ANXIETY: ICD-10-CM

## 2021-12-22 RX ORDER — ESCITALOPRAM OXALATE 10 MG/1
10 TABLET ORAL DAILY
Qty: 90 TABLET | Refills: 5 | Status: SHIPPED | OUTPATIENT
Start: 2021-12-22

## 2022-01-03 ENCOUNTER — CLINICAL SUPPORT (OUTPATIENT)
Dept: FAMILY MEDICINE CLINIC | Facility: CLINIC | Age: 27
End: 2022-01-03
Payer: COMMERCIAL

## 2022-01-03 DIAGNOSIS — L50.9 URTICARIA: ICD-10-CM

## 2022-01-03 DIAGNOSIS — L30.9 ECZEMA, UNSPECIFIED TYPE: Primary | ICD-10-CM

## 2022-01-03 DIAGNOSIS — L29.9 ITCHING: Primary | ICD-10-CM

## 2022-01-03 PROCEDURE — 96372 THER/PROPH/DIAG INJ SC/IM: CPT | Performed by: FAMILY MEDICINE

## 2022-01-03 RX ORDER — METHYLPREDNISOLONE ACETATE 80 MG/ML
80 INJECTION, SUSPENSION INTRA-ARTICULAR; INTRALESIONAL; INTRAMUSCULAR; SOFT TISSUE ONCE
Status: COMPLETED | OUTPATIENT
Start: 2022-01-03 | End: 2022-01-03

## 2022-01-03 RX ORDER — HYDROXYZINE PAMOATE 25 MG/1
25 CAPSULE ORAL 3 TIMES DAILY PRN
Qty: 30 CAPSULE | Refills: 3 | Status: SHIPPED | OUTPATIENT
Start: 2022-01-03

## 2022-01-03 RX ORDER — PREDNISONE 10 MG/1
TABLET ORAL
Qty: 26 TABLET | Refills: 0 | Status: CANCELLED | OUTPATIENT
Start: 2022-01-03

## 2022-01-03 RX ADMIN — METHYLPREDNISOLONE ACETATE 80 MG: 80 INJECTION, SUSPENSION INTRA-ARTICULAR; INTRALESIONAL; INTRAMUSCULAR; SOFT TISSUE at 15:09

## 2022-01-06 ENCOUNTER — OFFICE VISIT (OUTPATIENT)
Dept: DERMATOLOGY | Facility: CLINIC | Age: 27
End: 2022-01-06
Payer: COMMERCIAL

## 2022-01-06 VITALS — TEMPERATURE: 98.5 F | WEIGHT: 119.8 LBS | BODY MASS INDEX: 17.74 KG/M2 | HEIGHT: 69 IN

## 2022-01-06 DIAGNOSIS — D48.9 NEOPLASM OF UNCERTAIN BEHAVIOR: ICD-10-CM

## 2022-01-06 DIAGNOSIS — L01.00 IMPETIGO: Primary | ICD-10-CM

## 2022-01-06 PROCEDURE — 1036F TOBACCO NON-USER: CPT | Performed by: DERMATOLOGY

## 2022-01-06 PROCEDURE — 87205 SMEAR GRAM STAIN: CPT | Performed by: STUDENT IN AN ORGANIZED HEALTH CARE EDUCATION/TRAINING PROGRAM

## 2022-01-06 PROCEDURE — 87070 CULTURE OTHR SPECIMN AEROBIC: CPT | Performed by: STUDENT IN AN ORGANIZED HEALTH CARE EDUCATION/TRAINING PROGRAM

## 2022-01-06 PROCEDURE — 99204 OFFICE O/P NEW MOD 45 MIN: CPT | Performed by: DERMATOLOGY

## 2022-01-06 PROCEDURE — 3008F BODY MASS INDEX DOCD: CPT | Performed by: DERMATOLOGY

## 2022-01-06 PROCEDURE — 87186 SC STD MICRODIL/AGAR DIL: CPT | Performed by: STUDENT IN AN ORGANIZED HEALTH CARE EDUCATION/TRAINING PROGRAM

## 2022-01-06 RX ORDER — CEPHALEXIN 500 MG/1
500 CAPSULE ORAL EVERY 8 HOURS SCHEDULED
Qty: 27 CAPSULE | Refills: 0 | Status: SHIPPED | OUTPATIENT
Start: 2022-01-06 | End: 2022-01-15

## 2022-01-06 RX ORDER — CEPHALEXIN 500 MG/1
500 CAPSULE ORAL EVERY 8 HOURS SCHEDULED
Qty: 21 CAPSULE | Refills: 0 | Status: SHIPPED | OUTPATIENT
Start: 2022-01-06 | End: 2022-01-06

## 2022-01-06 NOTE — PATIENT INSTRUCTIONS
IMPETIGO    Assessment and Plan:  Based on a thorough discussion of this condition and the management approach to it (including a comprehensive discussion of the known risks, side effects and potential benefits of treatment), the patient (family) agrees to implement the following specific plan:   Start oral Cephalexin (Keflex) 500 mg three times a day with full meal and glass of water for 9 days   Start topical Mupirocin ointment 2-3 times daily to the red crusted areas on face and arms   Use moisturizer like Eucerin,Cerave or Aveeno Cream 3 times a day for the dry skin          Wash all masks    Obtained wound culture and will call with results    Follow up in 4 weeks - Feb 4th at 1:30 pm at [facility]   Discontinue other medications and topicals       What is impetigo? Impetigo is due to localized, superficial and infection with Staphylococcus aureus and/or Streptococcus pyogenes  Ecthyma is a deeper infection caused by the same organisms  The infection does not affect hair follicles unlike a boil  These infections may complicate wound healing, infestations and all forms of dermatitis  On the other hand, infections may also lead to dermatitis flare ups  Impetigo is often found in children and is also highly contagious  Most people get impetigo through skin-to-skin contact with someone who has it  Children and athletes like wrestlers and football players often get it this way  It's also possible to get it by using something infected with the bacteria that cause impetigo such as an infected towel or sports equipment  Wearing infected clothing is another way to get impetigo      Other predisposing causes include   Climatic conditions (humidity, occlusive clothing)   Underlying skin disease (atopic dermatitis, hidradenitis suppurativa)   Iron deficiency   Diabetes mellitus   Defective neutrophil function (treated with oral vitamin C)   Immunodeficiency, including hypogammaglobulinemia and HIV infection    What are the symptoms of impetigo? Staphylococcal impetigo is characterized by surface honey-yellow crusting or blisters  It tends to be itchy  Streptococcal impetigo is characterized by crusting and ulceration  Ecthyma results in scabs covering full skin thickness ulcers  These deeper infections may be painful   Starts with one or more sores, which are often itchy   The sores quickly burst, and the skin can be red or raw where the sores have broken open   Glands near the sores may feel swollen   Crusts, usually honey-colored, form   The skin heals without scarring, unless scratching cuts deep into the skin  The infection can spread to other areas of the body, where you'll see this process begin all over again  This is one reason treatment is so important  A severe form, bullous impetigo, is due to S  aureus that produces an exfoliative exotoxin, exfoliatin  This produces a split between the stratum granulosum and stratum spinosum within the epidermis, causing blisters to form   Blisters appear that contain a cloudy or yellow fluid   The blisters become limp and transparent and then break open   Crusty sores form where the blisters have broken open   The skin tends to heal without scarring  Ecthyma:  Ecthyma (ec-thy-ma) can develop when impetigo goes untreated  This is a more serious type of infection because it goes deeper into the skin  When a person has ecthyma, you'll see:   Painful blisters   Blisters turn into deep, open sores   Thick crusts develop, often with redness on the surrounding skin   Because the infection goes deeper into the skin, you may see scars once the skin heals  How do we treat impetigo? Dermatologists often prescribe an antibiotic that you apply to the skin, such as mupirocin or retapamulin  The [place]  Food and Drug Administration (FDA) has approved retapamulin to treat impetigo in children as young as 6 months old   Mupirocin is FDA approved to treat people 15years of age and older  When necessary, a dermatologist may prescribe one of these medicines to treat a child younger than the FDA-approved age  This is called off-label use and is legal  It can also be very helpful  If a dermatologist prescribes an antibiotic you apply to the skin, you would apply it to the skin that is affected by impetigo  If you experience several outbreaks, you may need to apply it inside the nostrils  The bacteria that cause impetigo often thrive in the nostrils  - Meticulous wound care and antiseptics (povidone iodine, chlorhexidine, triclosan and others) as local application and cleanser  Sometimes stronger medicine is necessary for more extensive or recurrent infections  Your dermatologist can prescribe an antibiotic that you take by mouth  - First line treatment should be with flucloxacillin or dicloxacillin  In penicillin-allergic patients, erythromycin may be used but there is a higher rate of resistance  - A few patients need injections of an antibiotic   - Your dermatologist may take swabs from active lesions and nostrils to determine antibiotic sensitivity  Along with local patterns of bacterial strains can guide which antibiostic to use  Choices include:  - Cephalexin  - Co-amoxiclav  - Trimethoprim + sulphamethoxazole  - Ciprofloxacin  - Fusidic acid  - Rifampicin  - Clindamycin  In general, Staphylococcal infections are contagious, requiring careful attention to hygiene     Wash hands frequently   Use antiseptics for bathing   Hot wash clothing, bedding, towels   Avoid sharing clothing and towels

## 2022-01-06 NOTE — PROGRESS NOTES
[facility] Note     Patient Name: Aviva Millan  Encounter Date: 01/06/2022     Have you been cared for by a [facility] Dermatologist in the last 3 years and, if so, which one? No    · Have you traveled outside of the [facility] in the past 3 months? No     May we call your Preferred Phone number to discuss your specific medical information? Yes     May we leave a detailed message that includes your specific medical information? Yes      Today's Chief Concerns:   Concern #1:  Rash around mouth, forearms and eyelid    Past Medical History:  Have you personally ever had or currently have any of the following? · Skin cancer (such as Melanoma, Basal Cell Carcinoma, Squamous Cell Carcinoma? (If Yes, please provide more detail)- No  · Eczema: No  · Psoriasis: No  · HIV/AIDS: No  · Hepatitis B or C: No  · Tuberculosis: No  · Systemic Immunosuppression such as Diabetes, Biologic or Immunotherapy, Chemotherapy, Organ Transplantation, Bone Marrow Transplantation (If YES, please provide more detail): No  · Radiation Treatment (If YES, please provide more detail): No  · Any other major medical conditions/concerns? (If Yes, which types)- No    Social History:    What is/was your primary occupation? [employer]    What are your hobbies/past-times? Watching TV and hanging out with friends     Family history:    Have any of your "first degree relatives" (parent, brother, sister, or child) had any of the following       · Skin cancer such as Melanoma or Merkel Cell Carcinoma or Pancreatic Cancer? No  · Eczema, Asthma, Hay Fever or Seasonal Allergies: No  · Psoriasis or Psoriatic Arthritis: YES, Mother and grandmother -psorasis   · Do any other medical conditions seem to run in your family? If Yes, what condition and which relatives?   No    Current Medications:    Current Outpatient Medications:     cetirizine (ZyrTEC) 10 mg tablet, Take 10 mg by mouth daily, Disp: , Rfl:     escitalopram (Lexapro) 10 mg tablet, Take 1 tablet (10 mg total) by mouth daily, Disp: 90 tablet, Rfl: 5    famotidine (PEPCID) 40 MG tablet, Take 1 tablet (40 mg total) by mouth daily at bedtime, Disp: 90 tablet, Rfl: 2    hydrOXYzine pamoate (VISTARIL) 25 mg capsule, Take 1 capsule (25 mg total) by mouth 3 (three) times a day as needed for itching, Disp: 30 capsule, Rfl: 3    methylPREDNISolone 4 MG tablet therapy pack, Use as directed on package, Disp: 21 each, Rfl: 0    nystatin (MYCOSTATIN) 500,000 units/5 mL suspension, Apply 1-2ml to each side of mouth 4x a day for 7 days, Disp: 120 mL, Rfl: 1    pantoprazole (PROTONIX) 40 mg tablet, Take 1 tablet (40 mg total) by mouth daily, Disp: 90 tablet, Rfl: 3    zolpidem (AMBIEN) 10 mg tablet, take 1 tablet by mouth at bedtime if needed for sleep, Disp: 30 tablet, Rfl: 3      Review of Systems/System Alerts:  Have you recently had or currently have any of the following? If YES, what are you doing for the problem? · Fever, chills or unintended weight loss: No  · Sudden loss or change in your vision: No  · Nausea, vomiting or blood in your stool: No  · Painful or swollen joints: No  · Wheezing or cough: No  · Changing mole or non-healing wound: No  · Nosebleeds: No  · Excessive sweating: No  · Easy or prolonged bleeding? No  · Over the last 2 weeks, how often have you been bothered by the following problems? · Taking little interest or pleasure in doing things: 1 - Not at All  · Feeling down, depressed, or hopeless: 1 - Not at All  · Rapid heartbeat with epinephrine:  No  · FEMALES ONLY:    · Are you pregnant or planning to become pregnant? N/A  · Are you currently or planning to be nursing or breast feeding? N/A  · Any known allergies?   No  · No Known Allergies      PHYSICAL EXAM:       Was a chaperone (Derm Clinical Assistant) present throughout the entire Physical Exam? Yes     Did the Dermatology Team specifically  the patient on the importance of a Full Skin Exam to be sure that nothing is missed clinically? Yes  o Did the patient request or accept a Full Skin Exam?  NO  o Did the patient specifically refuse to have the areas "under-the-bra" examined by the Dermatologist? No  o Did the patient specifically refuse to have the areas "under-the-underwear" examined by the Dermatologist? No      CONSTITUTIONAL :   There were no vitals filed for this visit  PSYCH: Normal mood and affect  EYES: Normal conjunctiva  ENT: Normal lips and oral mucosa  CARDIOVASCULAR: No edema  RESPIRATORY: Normal respirations      SKIN:  FULL ORGAN SYSTEM EXAM  Hair, Scalp, Ears, Face Normal except as noted below in Assessment   Neck, Cervical Chain Nodes Normal except as noted below in Assessment   Right Arm/Hand/Fingers Normal except as noted below in Assessment   Left Arm/Hand/Fingers Normal except as noted below in Assessment   Chest/Breasts/Axillae Viewed areas Normal except as noted below in Assessment   Abdomen, Umbilicus Normal except as noted below in Assessment   Back/Spine Normal except as noted below in Assessment        ASSESSMENT AND PLAN BY DIAGNOSIS:    History of Present Condition:     Duration:  How long has this been an issue for you?    o  one week    Location Affected:  Where on the body is this affecting you?    o  around mouth, eyelid, armpit and forearms   Quality:  Is there any bleeding, pain, itch, burning/irritation, or redness associated with the skin lesion? o  bleeding, pain, itching, burning and irritation   Severity:  Describe any bleeding, pain, itch, burning/irritation, or redness on a scale of 1 to 10 (with 10 being the worst)  o  pain -5    Timing:  Does this condition seem to be there pretty constantly or do you notice it more at specific times throughout the day?     o  constant    Context:  Have you ever noticed that this condition seems to be associated with specific activities you do?    o  eating and talking  Modifying Factors:    o Anything that seems to make the condition worse?    -  denies   o What have you tried to do to make the condition better?    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  He was given topical nystatin cream but he thinks it made it worse  He is very itchy  Assessment and Plan:  Based on a thorough discussion of this condition and the management approach to it (including a comprehensive discussion of the known risks, side effects and potential benefits of treatment), the patient (family) agrees to implement the following specific plan:   Start oral Cephalexin (Keflex) 500 mg three times a day with full meal and glass of water for 9 days   Start topical Mupirocin ointment 2-3 times daily to the red crusted areas on face and arms   Use moisturizer like Eucerin,Cerave or Aveeno Cream 3 times a day for the dry skin          Wash all masks    Obtained wound culture and will call with results    Follow up in 4 weeks - Feb 4th at 1:30 pm at [facility]   Discontinue other medications and topicals     What is impetigo? Impetigo is due to localized, superficial and infection with Staphylococcus aureus and/or Streptococcus pyogenes  Ecthyma is a deeper infection caused by the same organisms  The infection does not affect hair follicles unlike a boil  These infections may complicate wound healing, infestations and all forms of dermatitis   On the other hand, infections may also lead to dermatitis flare ups  Impetigo is often found in children and is also highly contagious  Most people get impetigo through skin-to-skin contact with someone who has it  Children and athletes like wrestlers and football players often get it this way  It's also possible to get it by using something infected with the bacteria that cause impetigo such as an infected towel or sports equipment  Wearing infected clothing is another way to get impetigo  Other predisposing causes include   Climatic conditions (humidity, occlusive clothing)   Underlying skin disease (atopic dermatitis, hidradenitis suppurativa)   Iron deficiency   Diabetes mellitus   Defective neutrophil function (treated with oral vitamin C)   Immunodeficiency, including hypogammaglobulinemia and HIV infection    What are the symptoms of impetigo? Staphylococcal impetigo is characterized by surface honey-yellow crusting or blisters  It tends to be itchy  Streptococcal impetigo is characterized by crusting and ulceration  Ecthyma results in scabs covering full skin thickness ulcers  These deeper infections may be painful   Starts with one or more sores, which are often itchy   The sores quickly burst, and the skin can be red or raw where the sores have broken open   Glands near the sores may feel swollen   Crusts, usually honey-colored, form   The skin heals without scarring, unless scratching cuts deep into the skin  The infection can spread to other areas of the body, where you'll see this process begin all over again  This is one reason treatment is so important  A severe form, bullous impetigo, is due to S  aureus that produces an exfoliative exotoxin, exfoliatin  This produces a split between the stratum granulosum and stratum spinosum within the epidermis, causing blisters to form   Blisters appear that contain a cloudy or yellow fluid   The blisters become limp and transparent and then break open     Crusty sores form where the blisters have broken open   The skin tends to heal without scarring  Ecthyma:  Ecthyma (ec-thy-ma) can develop when impetigo goes untreated  This is a more serious type of infection because it goes deeper into the skin  When a person has ecthyma, you'll see:   Painful blisters   Blisters turn into deep, open sores   Thick crusts develop, often with redness on the surrounding skin   Because the infection goes deeper into the skin, you may see scars once the skin heals  How do we treat impetigo? Dermatologists often prescribe an antibiotic that you apply to the skin, such as mupirocin or retapamulin  The [place]  Food and Drug Administration (FDA) has approved retapamulin to treat impetigo in children as young as 6 months old  Mupirocin is FDA approved to treat people 15years of age and older  When necessary, a dermatologist may prescribe one of these medicines to treat a child younger than the FDA-approved age  This is called off-label use and is legal  It can also be very helpful  If a dermatologist prescribes an antibiotic you apply to the skin, you would apply it to the skin that is affected by impetigo  If you experience several outbreaks, you may need to apply it inside the nostrils  The bacteria that cause impetigo often thrive in the nostrils  - Meticulous wound care and antiseptics (povidone iodine, chlorhexidine, triclosan and others) as local application and cleanser  Sometimes stronger medicine is necessary for more extensive or recurrent infections  Your dermatologist can prescribe an antibiotic that you take by mouth  - First line treatment should be with flucloxacillin or dicloxacillin  In penicillin-allergic patients, erythromycin may be used but there is a higher rate of resistance  - A few patients need injections of an antibiotic   - Your dermatologist may take swabs from active lesions and nostrils to determine antibiotic sensitivity   Along with local patterns of bacterial strains can guide which antibiostic to use  Choices include:  - Cephalexin  - Co-amoxiclav  - Trimethoprim + sulphamethoxazole  - Ciprofloxacin  - Fusidic acid  - Rifampicin  - Clindamycin  In general, Staphylococcal infections are contagious, requiring careful attention to hygiene   Wash hands frequently   Use antiseptics for bathing   Hot wash clothing, bedding, towels   Avoid sharing clothing and towels    NEOPLASM OF UNCERTAIN BEHAVIOR OF SKIN    Physical Exam:   (Anatomic Location); (Size and Morphological Description); (Differential Diagnosis):  o Left temple: 0 5 cm pink papule with uniform rim of brown pigmentation    Additional History of Present Condition:  Patient states he has had this since birth  It has not changed  Assessment and Plan:   I have discussed with the patient that a sample of skin via a "skin biopsy would be potentially helpful to further make a specific diagnosis under the microscope   Based on a thorough discussion of this condition and the management approach to it (including a comprehensive discussion of the known risks, side effects and potential benefits of treatment), the patient (family) agrees to implement the following specific plan:    o Discussed with patient that at next visit, can biopsy the mole just to make sure  Would not biopsy today given stable history since childhood and that his face is impetiginized  Will treat impetigo first and then biopsy  Scribe Attestation    I,:  Negrito Camarillo am acting as a scribe while in the presence of the attending physician :       I,:  Chandrakant Singh MD personally performed the services described in this documentation    as scribed in my presence :         The patient was seen and discussed with Dr Nenita Kirkland       RTC: 2/4/2022 at 1:30 PM     Chadnrakant Singh  Dermatology PGY-3 Resident Physician

## 2022-01-08 DIAGNOSIS — K21.00 GASTROESOPHAGEAL REFLUX DISEASE WITH ESOPHAGITIS: ICD-10-CM

## 2022-01-08 LAB
BACTERIA WND AEROBE CULT: ABNORMAL
GRAM STN SPEC: ABNORMAL

## 2022-01-10 RX ORDER — PANTOPRAZOLE SODIUM 40 MG/1
TABLET, DELAYED RELEASE ORAL
Qty: 90 TABLET | Refills: 3 | Status: SHIPPED | OUTPATIENT
Start: 2022-01-10

## 2022-02-04 ENCOUNTER — OFFICE VISIT (OUTPATIENT)
Dept: DERMATOLOGY | Facility: CLINIC | Age: 27
End: 2022-02-04
Payer: COMMERCIAL

## 2022-02-04 VITALS — BODY MASS INDEX: 18.08 KG/M2 | WEIGHT: 122.4 LBS

## 2022-02-04 DIAGNOSIS — L01.00 IMPETIGO: ICD-10-CM

## 2022-02-04 DIAGNOSIS — D48.9 NEOPLASM OF UNCERTAIN BEHAVIOR: Primary | ICD-10-CM

## 2022-02-04 PROCEDURE — 88342 IMHCHEM/IMCYTCHM 1ST ANTB: CPT | Performed by: STUDENT IN AN ORGANIZED HEALTH CARE EDUCATION/TRAINING PROGRAM

## 2022-02-04 PROCEDURE — 99214 OFFICE O/P EST MOD 30 MIN: CPT | Performed by: DERMATOLOGY

## 2022-02-04 PROCEDURE — 11102 TANGNTL BX SKIN SINGLE LES: CPT | Performed by: DERMATOLOGY

## 2022-02-04 PROCEDURE — 88305 TISSUE EXAM BY PATHOLOGIST: CPT | Performed by: STUDENT IN AN ORGANIZED HEALTH CARE EDUCATION/TRAINING PROGRAM

## 2022-02-04 PROCEDURE — 1036F TOBACCO NON-USER: CPT | Performed by: DERMATOLOGY

## 2022-02-04 PROCEDURE — 88341 IMHCHEM/IMCYTCHM EA ADD ANTB: CPT | Performed by: STUDENT IN AN ORGANIZED HEALTH CARE EDUCATION/TRAINING PROGRAM

## 2022-02-04 NOTE — PATIENT INSTRUCTIONS
INFORMED CONSENT DISCUSSION AND POST-OPERATIVE INSTRUCTIONS FOR PATIENT    I   RATIONALE FOR PROCEDURE  I understand that a skin biopsy allows the Dermatologist to test a lesion or rash under the microscope to obtain a diagnosis  It usually involves numbing the area with numbing medication and removing a small piece of skin; sometimes the area will be closed with sutures  In this specific procedure, sutures are not usually needed  If any sutures are placed, then they are usually need to be removed in 2 weeks or less  I understand that my Dermatologist recommends that a skin "shave" biopsy be performed today  A local anesthetic, similar to the kind that a dentist uses when filling a cavity, will be injected with a very small needle into the skin area to be sampled  The injected skin and tissue underneath "will go to sleep and become numb so no pain should be felt afterwards  An instrument shaped like a tiny "razor blade" (shave biopsy instrument) will be used to cut a small piece of tissue and skin from the area so that a sample of tissue can be taken and examined more closely under the microscope  A slight amount of bleeding will occur, but it will be stopped with direct pressure and a pressure bandage and any other appropriate methods  I understands that a scar will form where the wound was created  Surgical ointment will be applied to help protect the wound  Sutures are not usually needed      II   RISKS AND POTENTIAL COMPLICATIONS   I understand the risks and potential complications of a skin biopsy include but are not limited to the following:   Bleeding   Infection   Pain   Scar/keloid   Skin discoloration   Incomplete Removal   Recurrence   Nerve Damage/Numbness/Loss of Function   Allergic Reaction to Anesthesia   Biopsies are diagnostic procedures and based on findings additional treatment or evaluation may be required   Loss or destruction of specimen resulting in no additional findings    My Dermatologist has explained to me the nature of the condition, the nature of the procedure, and the benefits to be reasonably expected compared with alternative approaches  My Dermatologist has discussed the likelihood of major risks or complications of this procedure including the specific risks listed above, such as bleeding, infection, and scarring/keloid  I understand that a scar is expected after this procedure  I understand that my physician cannot predict if the scar will form a "keloid," which extends beyond the borders of the wound that is created  A keloid is a thick, painful, and bumpy scar  A keloid can be difficult to treat, as it does not always respond well to therapy, which includes injecting cortisone directly into the keloid every few weeks  While this usually reduces the pain and size of the scar, it does not eliminate it  I understand that photographs may be taken before and after the procedure  These will be maintained as part of the medical providers confidential records and may not be made available to me  I further authorize the medical provider to use the photographs for teaching purposes or to illustrate scientific papers, books, or lectures if in his/her judgment, medical research, education, or science may benefit from its use  I have had an opportunity to fully inquire about the risks and benefits of this procedure and its alternatives  I have been given ample time and opportunity to ask questions and to seek a second opinion if I wished to do so  I acknowledge that there have specifically been no guarantees as to the cosmetic results from the procedure  I am aware that with any procedure there is always the possibility of an unexpected complication  III  POST-PROCEDURAL CARE (WHAT YOU WILL NEED TO DO "AFTER THE BIOPSY" TO OPTIMIZE HEALING)     Keep the area clean and dry    Try NOT to remove the bandage or get it wet for the first 24 hours      Gently clean the area and apply surgical ointment (such as Vaseline petrolatum ointment, which is available "over the counter" and not a prescription) to the biopsy site for up to 2 weeks straight  This acts to protect the wound from the outside world   Sutures are not usually placed in this procedure  If any sutures were placed, return for suture removal as instructed (generally 1 week for the face, 2 weeks for the body)   Take Acetaminophen (Tylenol) for discomfort, if no contraindications  Ibuprofen or aspirin could make bleeding worse   Call our office immediately for signs of infection: fever, chills, increased redness, warmth, tenderness, discomfort/pain, or pus or foul smell coming from the wound  WHAT TO DO IF THERE IS ANY BLEEDING? If a small amount of bleeding is noticed, place a clean cloth over the area and apply firm pressure for ten minutes  Check the wound after 10 minutes of direct pressure  If bleeding persists, try one more time for an additional 10 minutes of direct pressure on the area  If the bleeding becomes heavier or does not stop after the second attempt, or if you have any other questions about this procedure, then please call your [facility]'s Dermatologist by calling [phone] (SKIN)  I hereby acknowledge that I have reviewed and verified the site with my Dermatologist and have requested and authorized my Dermatologist to proceed with the procedure

## 2022-02-04 NOTE — PROGRESS NOTES
[facility] Follow Up Note    Patient Name: Christen Osborn  Encounter Date: 02/04/2022    Today's Chief Concerns:  Xenia Arthur Concern #1:  Rash follow up     Current Medications:    Current Outpatient Medications:     cetirizine (ZyrTEC) 10 mg tablet, Take 10 mg by mouth daily, Disp: , Rfl:     escitalopram (Lexapro) 10 mg tablet, Take 1 tablet (10 mg total) by mouth daily, Disp: 90 tablet, Rfl: 5    pantoprazole (PROTONIX) 40 mg tablet, take 1 tablet by mouth once daily, Disp: 90 tablet, Rfl: 3    zolpidem (AMBIEN) 10 mg tablet, take 1 tablet by mouth at bedtime if needed for sleep, Disp: 30 tablet, Rfl: 3    famotidine (PEPCID) 40 MG tablet, Take 1 tablet (40 mg total) by mouth daily at bedtime (Patient not taking: Reported on 2/4/2022 ), Disp: 90 tablet, Rfl: 2    hydrOXYzine pamoate (VISTARIL) 25 mg capsule, Take 1 capsule (25 mg total) by mouth 3 (three) times a day as needed for itching (Patient not taking: Reported on 2/4/2022 ), Disp: 30 capsule, Rfl: 3    methylPREDNISolone 4 MG tablet therapy pack, Use as directed on package (Patient not taking: Reported on 1/6/2022 ), Disp: 21 each, Rfl: 0    mupirocin (BACTROBAN) 2 % ointment, Apply topically 3 (three) times a day To the infected areas on the face and arms for 3 weeks  (Patient not taking: Reported on 2/4/2022 ), Disp: 30 g, Rfl: 4    nystatin (MYCOSTATIN) 500,000 units/5 mL suspension, Apply 1-2ml to each side of mouth 4x a day for 7 days (Patient not taking: Reported on 1/6/2022 ), Disp: 120 mL, Rfl: 1    CONSTITUTIONAL:   Vitals:    02/04/22 1338   Weight: 55 5 kg (122 lb 6 4 oz)       Specific Alerts:    Have you been seen by a [facility] Dermatologist in the last 3 years? YES    No Known Allergies    May we call your Preferred Phone number to discuss your specific medical information? YES    May we leave a detailed message that includes your specific medical information?  YES    Have you traveled outside of the [facility] [place] in the past 3 months? No    Review of Systems:  Have you recently had or currently have any of the following? · Fever or chills: No  · Night Sweats: No  · Headaches: No  · Weight Gain: No  · Weight Loss: No  · Blurry Vision: No  · Nausea: No  · Vomiting: No  · Diarrhea: No  · Blood in Stool: No  · Abdominal Pain: No  · Itchy Skin: No  · Painful Joints: No  · Swollen Joints: No  · Muscle Pain: No  · Irregular Mole: No  · Sun Burn: No  · Dry Skin: No  · Skin Color Changes: No  · Scar or Keloid: No  · Cold Sores/Fever Blisters: No  · Bacterial Infections/MRSA: No  · Anxiety: No  · Depression: No  · Suicidal or Homicidal Thoughts: No      PSYCH: Normal mood and affect  EYES: Normal conjunctiva  ENT: Normal lips and oral mucosa  CARDIOVASCULAR: No edema  RESPIRATORY: Normal respirations    FULL ORGAN SYSTEM SKIN EXAM (SKIN)   Hair, Scalp, Ears, Face Normal except as noted below in Assessment   Neck, Cervical Chain Nodes Normal except as noted below in Assessment   Right Arm/Hand/Fingers Normal except as noted below in Assessment   Left Arm/Hand/Fingers Normal except as noted below in Assessment     IMPETIGO FOLLOW-UP    Physical Exam:   Anatomic Location Affected:  Beard area, chaparrita-orally, arms, and left armpit    Morphological Description:  No rash present on exam       Additional History of Present Condition:  Patient was put on Keflex 500 mg and mupirocin-he states that he is fully clear today  Assessment and Plan:  Based on a thorough discussion of this condition and the management approach to it (including a comprehensive discussion of the known risks, side effects and potential benefits of treatment), the patient (family) agrees to implement the following specific plan:   Monitor for changes  Use moisturizer like Eucerin,Cerave, Vanicream or Aveeno Cream 3 times a day for the dry skin         Start using Dove moisturizer bar soap in the shower      Follow up as needed       What is impetigo? Impetigo is due to localized, superficial and infection with Staphylococcus aureus and/or Streptococcus pyogenes  Ecthyma is a deeper infection caused by the same organisms  The infection does not affect hair follicles unlike a boil  These infections may complicate wound healing, infestations and all forms of dermatitis  On the other hand, infections may also lead to dermatitis flare ups  Impetigo is often found in children and is also highly contagious  Most people get impetigo through skin-to-skin contact with someone who has it  Children and athletes like wrestlers and football players often get it this way  It's also possible to get it by using something infected with the bacteria that cause impetigo such as an infected towel or sports equipment  Wearing infected clothing is another way to get impetigo  Other predisposing causes include   Climatic conditions (humidity, occlusive clothing)   Underlying skin disease (atopic dermatitis, hidradenitis suppurativa)   Iron deficiency   Diabetes mellitus   Defective neutrophil function (treated with oral vitamin C)   Immunodeficiency, including hypogammaglobulinemia and HIV infection    What are the symptoms of impetigo? Staphylococcal impetigo is characterized by surface honey-yellow crusting or blisters  It tends to be itchy  Streptococcal impetigo is characterized by crusting and ulceration  Ecthyma results in scabs covering full skin thickness ulcers  These deeper infections may be painful   Starts with one or more sores, which are often itchy   The sores quickly burst, and the skin can be red or raw where the sores have broken open   Glands near the sores may feel swollen   Crusts, usually honey-colored, form   The skin heals without scarring, unless scratching cuts deep into the skin  The infection can spread to other areas of the body, where you'll see this process begin all over again   This is one reason treatment is so important  A severe form, bullous impetigo, is due to S  aureus that produces an exfoliative exotoxin, exfoliatin  This produces a split between the stratum granulosum and stratum spinosum within the epidermis, causing blisters to form   Blisters appear that contain a cloudy or yellow fluid   The blisters become limp and transparent and then break open   Crusty sores form where the blisters have broken open   The skin tends to heal without scarring  Ecthyma:  Ecthyma (ec-thy-ma) can develop when impetigo goes untreated  This is a more serious type of infection because it goes deeper into the skin  When a person has ecthyma, you'll see:   Painful blisters   Blisters turn into deep, open sores   Thick crusts develop, often with redness on the surrounding skin   Because the infection goes deeper into the skin, you may see scars once the skin heals  How do we treat impetigo? Dermatologists often prescribe an antibiotic that you apply to the skin, such as mupirocin or retapamulin  The [place]  Food and Drug Administration (FDA) has approved retapamulin to treat impetigo in children as young as 6 months old  Mupirocin is FDA approved to treat people 15years of age and older  When necessary, a dermatologist may prescribe one of these medicines to treat a child younger than the FDA-approved age  This is called off-label use and is legal  It can also be very helpful  If a dermatologist prescribes an antibiotic you apply to the skin, you would apply it to the skin that is affected by impetigo  If you experience several outbreaks, you may need to apply it inside the nostrils  The bacteria that cause impetigo often thrive in the nostrils  - Meticulous wound care and antiseptics (povidone iodine, chlorhexidine, triclosan and others) as local application and cleanser  Sometimes stronger medicine is necessary for more extensive or recurrent infections   Your dermatologist can prescribe an antibiotic that you take by mouth  - First line treatment should be with flucloxacillin or dicloxacillin  In penicillin-allergic patients, erythromycin may be used but there is a higher rate of resistance  - A few patients need injections of an antibiotic   - Your dermatologist may take swabs from active lesions and nostrils to determine antibiotic sensitivity  Along with local patterns of bacterial strains can guide which antibiostic to use  Choices include:  - Cephalexin  - Co-amoxiclav  - Trimethoprim + sulphamethoxazole  - Ciprofloxacin  - Fusidic acid  - Rifampicin  - Clindamycin  In general, Staphylococcal infections are contagious, requiring careful attention to hygiene   Wash hands frequently   Use antiseptics for bathing   Hot wash clothing, bedding, towels   Avoid sharing clothing and towels      NEOPLASM OF UNCERTAIN BEHAVIOR OF SKIN    Physical Exam:   (Anatomic Location); (Size and Morphological Description); (Differential Diagnosis):  Specimen A: Left temple; skin; shave; 0 5 x 0 5 cm brown macule with central pink papule; Diff Dx: Atypical nevus vs melanoma     Additional History of Present Condition:  Patient does not recall when he noticed this mole last  No family history of skin cancer  Thinks it has been there for 2 years  Assessment and Plan:   I have discussed with the patient that a sample of skin via a "skin biopsy would be potentially helpful to further make a specific diagnosis under the microscope   Based on a thorough discussion of this condition and the management approach to it (including a comprehensive discussion of the known risks, side effects and potential benefits of treatment), the patient (family) agrees to implement the following specific plan:    o Procedure:  Skin Biopsy    After a thorough discussion of treatment options and risk/benefits/alternatives (including but not limited to local pain, scarring, dyspigmentation, blistering, possible superinfection, and inability to confirm a diagnosis via histopathology), verbal and written consent were obtained and portion of the rash was biopsied for tissue sample  See below for consent that was obtained from patient and subsequent Procedure Note  PROCEDURE SHAVE BIOPSY NOTE:     Performing Physician: Patrick Conte   Anatomic Location; Clinical Description with size (cm); Pre-Op Diagnosis:   o Specimen A: Left temple; skin; shave; 0 5 x 0 5 cm brown macule with central pink papule; Diff Dx: Atypical nevus vs melanoma    Post-op diagnosis: Same      Local anesthesia: 1% xylocaine with epi       Topical anesthesia: None     Hemostasis: Aluminum chloride       After obtaining informed consent  at which time there was a discussion about the purpose of biopsy  and low risks of infection and bleeding  The area was prepped and draped in the usual fashion  Anesthesia was obtained with 1% lidocaine with epinephrine  A shave biopsy to an appropriate sampling depth was obtained with a sterile blade (such as a 15-blade or DermaBlade)  The resulting wound was covered with surgical ointment and bandaged appropriately  The patient tolerated the procedure well without complications and was without signs of functional compromise  Specimen has been sent for review by Dermatopathology  Standard post-procedure care has been explained and has been included in written form within the patient's copy of Informed Consent  INFORMED CONSENT DISCUSSION AND POST-OPERATIVE INSTRUCTIONS FOR PATIENT    I   RATIONALE FOR PROCEDURE  I understand that a skin biopsy allows the Dermatologist to test a lesion or rash under the microscope to obtain a diagnosis  It usually involves numbing the area with numbing medication and removing a small piece of skin; sometimes the area will be closed with sutures  In this specific procedure, sutures are not usually needed  If any sutures are placed, then they are usually need to be removed in 2 weeks or less      I understand that my Dermatologist recommends that a skin "shave" biopsy be performed today  A local anesthetic, similar to the kind that a dentist uses when filling a cavity, will be injected with a very small needle into the skin area to be sampled  The injected skin and tissue underneath "will go to sleep and become numb so no pain should be felt afterwards  An instrument shaped like a tiny "razor blade" (shave biopsy instrument) will be used to cut a small piece of tissue and skin from the area so that a sample of tissue can be taken and examined more closely under the microscope  A slight amount of bleeding will occur, but it will be stopped with direct pressure and a pressure bandage and any other appropriate methods  I understands that a scar will form where the wound was created  Surgical ointment will be applied to help protect the wound  Sutures are not usually needed  II   RISKS AND POTENTIAL COMPLICATIONS   I understand the risks and potential complications of a skin biopsy include but are not limited to the following:   Bleeding   Infection   Pain   Scar/keloid   Skin discoloration   Incomplete Removal   Recurrence   Nerve Damage/Numbness/Loss of Function   Allergic Reaction to Anesthesia   Biopsies are diagnostic procedures and based on findings additional treatment or evaluation may be required   Loss or destruction of specimen resulting in no additional findings    My Dermatologist has explained to me the nature of the condition, the nature of the procedure, and the benefits to be reasonably expected compared with alternative approaches  My Dermatologist has discussed the likelihood of major risks or complications of this procedure including the specific risks listed above, such as bleeding, infection, and scarring/keloid  I understand that a scar is expected after this procedure    I understand that my physician cannot predict if the scar will form a "keloid," which extends beyond the borders of the wound that is created  A keloid is a thick, painful, and bumpy scar  A keloid can be difficult to treat, as it does not always respond well to therapy, which includes injecting cortisone directly into the keloid every few weeks  While this usually reduces the pain and size of the scar, it does not eliminate it  I understand that photographs may be taken before and after the procedure  These will be maintained as part of the medical providers confidential records and may not be made available to me  I further authorize the medical provider to use the photographs for teaching purposes or to illustrate scientific papers, books, or lectures if in his/her judgment, medical research, education, or science may benefit from its use  I have had an opportunity to fully inquire about the risks and benefits of this procedure and its alternatives  I have been given ample time and opportunity to ask questions and to seek a second opinion if I wished to do so  I acknowledge that there have specifically been no guarantees as to the cosmetic results from the procedure  I am aware that with any procedure there is always the possibility of an unexpected complication  III  POST-PROCEDURAL CARE (WHAT YOU WILL NEED TO DO "AFTER THE BIOPSY" TO OPTIMIZE HEALING)     Keep the area clean and dry  Try NOT to remove the bandage or get it wet for the first 24 hours   Gently clean the area and apply surgical ointment (such as Vaseline petrolatum ointment, which is available "over the counter" and not a prescription) to the biopsy site for up to 2 weeks straight  This acts to protect the wound from the outside world   Sutures are not usually placed in this procedure  If any sutures were placed, return for suture removal as instructed (generally 1 week for the face, 2 weeks for the body)   Take Acetaminophen (Tylenol) for discomfort, if no contraindications    Ibuprofen or aspirin could make bleeding worse   Call our office immediately for signs of infection: fever, chills, increased redness, warmth, tenderness, discomfort/pain, or pus or foul smell coming from the wound  WHAT TO DO IF THERE IS ANY BLEEDING? If a small amount of bleeding is noticed, place a clean cloth over the area and apply firm pressure for ten minutes  Check the wound after 10 minutes of direct pressure  If bleeding persists, try one more time for an additional 10 minutes of direct pressure on the area  If the bleeding becomes heavier or does not stop after the second attempt, or if you have any other questions about this procedure, then please call your [facility]atologist by calling [phone] (SKIN)  I hereby acknowledge that I have reviewed and verified the site with my Dermatologist and have requested and authorized my Dermatologist to proceed with the procedure      Scribe Attestation    I,:  Davis Munguia MA am acting as a scribe while in the presence of the attending physician :       I,:  Cherylene Needy, MD personally performed the services described in this documentation    as scribed in my presence :         The patient was seen and discussed with Dr Radha Garces    RTC: will call patient with biopsy results and schedule next steps in treatment accordingly    Cherylene Needy  Dermatology PGY-3 Resident Physician

## 2022-03-11 ENCOUNTER — PROCEDURE VISIT (OUTPATIENT)
Dept: DERMATOLOGY | Facility: CLINIC | Age: 27
End: 2022-03-11
Payer: COMMERCIAL

## 2022-03-11 VITALS — HEIGHT: 69 IN | TEMPERATURE: 98.5 F | BODY MASS INDEX: 18.07 KG/M2 | WEIGHT: 122 LBS

## 2022-03-11 DIAGNOSIS — D22.9 ATYPICAL NEVUS: Primary | ICD-10-CM

## 2022-03-11 PROCEDURE — 3008F BODY MASS INDEX DOCD: CPT | Performed by: DERMATOLOGY

## 2022-03-11 PROCEDURE — 88305 TISSUE EXAM BY PATHOLOGIST: CPT | Performed by: STUDENT IN AN ORGANIZED HEALTH CARE EDUCATION/TRAINING PROGRAM

## 2022-03-11 PROCEDURE — 11442 EXC FACE-MM B9+MARG 1.1-2 CM: CPT | Performed by: DERMATOLOGY

## 2022-03-11 PROCEDURE — 12052 INTMD RPR FACE/MM 2.6-5.0 CM: CPT | Performed by: DERMATOLOGY

## 2022-03-11 NOTE — PATIENT INSTRUCTIONS
POSTOP DISCUSSION DISCUSSION AND INSTRUCTIONS FOR PATIENT      Rationale for Procedure  A skin excision allows the dermatologist to remove a lesion  The procedure involves a local numbing medication and removing the entire lesion  Typically, the lesion is being removed because it is atypical, traumatized, or for significant appearance reasons  The area will be open like a brush burn and allowed to heal    There will be no sutures  Tissue is sent to pathologist who will reconfirm diagnosis and verify completeness of lesion removal     Description of Procedure  We would like to perform a skin excision today  A local anesthetic, similar to the kind that a dentist uses when filling a cavity, will be injected with a very small needle into the skin area to be sampled  The injected skin and tissue underneath should go to sleep and become numbed so that no further pain should be felt  A scalpel will be used to cut around the lesion and tissue will be submitted to pathology for examination  Depending on the diagnosis the lesion will be excised with a certain amount of normal skin to help assure completeness of lesion removal   The physician will discuss in advance the anticipated size and extent of removal    Bleeding will occur, but it will stopped with direct pressure, sutures, and electrocautery  Surgical Vaseline-type ointment will also applied after the procedure to help create a barrier between the wound and the outside world  Risks and Potential Complications  The advantage of a skin excision is that it allows us to remove a problem lesion quickly  Although this usually permits the lesion to heal as soon as possible with the least scarring, there are some risks and potential complications that include but are not limited to the following:  - Some bleeding is normal at time of procedure and some bleeding on gauze is normal  the first few days after surgery    Profuse bleeding and bleeding with swelling and pain should be reported as detailed  below  - Infection is uncommon in skin surgery  Infection should be reported and is indicated by pain, redness, and discharge of purulent material   - Some dull to at time sharp pain could occur initially the day after surgery  Persistent pain or increasing pain days after surgery is not expected and should be reported  - Every effort is made to minimize scar, but location, size, and genetics do play a role in scar appearance  A surgical wound does not achieve its optimal appearance until 6 months  There are several treatments available if scarring would be problematic including scar creams, silicone pad, laser and scar revision   - Skin discoloration can occur especially in people of color  Its important to avoid sun on wound in first 6 months after surgery  Treatment is available if pigment is problematic   - Incomplete removal of the lesion or recurrence of lesion can occur and this would then require further treatment and more surgery   - Nerve Damage/Numbness/Loss of Function is very rare, but is most likely to occur if lesion is large or if it is in a high risk location  - Allergic Reaction to lidocaine is rare  More commonly,  epinephrine is used  with the lidocaine  Occasionally, epinephrine (aka adrenalin) may cause a brief  feeling of anxiety or jitteriness  - The person at the microscope  (pathologist) may provide additional information that was unexpected  This unexpected finding could provoke the need for additional treatment or evaluation  What You Will Need to Do After the Procedure  1  Keep the area clean and dry the first day  Try NOT to remove the bandage for the first day  2  Gently clean the area with soap and water and apply Vaseline ointment (this is over the counter and not a prescription) to the excision  site for up to 2 weeks  3  Apply a clean appropriately sized bandage to area    Gauze and paper tape are recommended for sensitive skin   4  Return for suture removal as instructed (generally 1 week for the face, 2 weeks for the body)  5  Take Acetaminophen (Tylenol) for discomfort, if no contraindications  Do NOT take Ibuprofen or aspirin unless specifically told to do so by your Dermatologist because these medications can make bleeding worse  6  Call our office immediately for signs of infection: fever, chills, increased redness, warmth, tenderness, discomfort/pain, or pus or foul smell coming from the wound  7  Recommended Mupirocin three times a day for two weeks   8  Recommended Dove bar soap   9  Return for full skin check on 4/8/22 at 1:00 pm    If bleeding is noticed, place a clean cloth over the area and apply firm pressure for thirty minutes  Check the wound ONLY after 30 minutes of direct pressure; do not cheat and sneak a peak, as that does not count  If bleeding persists after 30 minutes of legitimate direct pressure, then try one more round of direct pressure for an additional 10 minutes to the area  Should the bleeding become heavier or not stop after the second attempt, call [facility] directly at [phone] (SKIN) or, if after hours, go to your local Emergency Room/Emergency Department

## 2022-03-11 NOTE — PROGRESS NOTES
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; extending focally to the tissue edges   Post-Op Diagnosis: Same   Benign versus Malignant benign      Lesion Anatomic Location: left temple (Previous Accession Number: [id number])  Pre-op size: 0 7 cm x 0 7 cm  Size of defect: 1 3 cm x 1 3 cm  (with 0 3 centimeter margins)  Final repaired wound length:  4 5 cm    Written and verbal, witnessed informed consent was obtained  I discussed that excision is a method of removing lesions both benign and malignant lesions  A portion of normal skin is often taken to ensure completeness of removal   I reviewed that procedure will include numbing the area,  cutting around and under defect, undermining tissue, and closing the wound with sutures both inside and out  These sutures are usually removed in 7 to 14 days  Risks (bleeding, pain, infection, scarring, recurrence) and benefits discussed  It was discussed with patient that every effort is made to minimize scar, but scarring is influenced also by extrinsic factor such as location, age and genetics  Time Out: performed:  yes  Correct patient: yes  Correct site per Clinic Report: yes  Correct site per Patient Report: yes    LOCAL ANESTHESIA: 1% xylocaine with epi     DESCRIPTION OF PROCEDURE: The patient was brought back into the procedure room, anesthetized locally, prepped and draped in the usual fashion  Using a #15 blade with a scalpel, the lesion was excised in elliptical fashion  The wound was  undermined in the  fascial plane  Hemostasis was achieved with light electrocoagulation  Purpose of undermining was to decrease wound tension and facilitate closure      The wound was closed with subcutaneous sutures as follows:    Deep suture:4-0 Vicryl      Epidermal edge closure was accomplished with superficial sutures as follows:    Superficial suture: 4-0 Ethilon  Superficial suture type: Interrupted     Estimated blood loss less than 3ml  The patient tolerated the procedure well without any complications  The wound was cleaned with sterile saline, dried off, surgical vaseline ointment was applied, and the wound was covered  A pressure dressing was applied for stabilization and light pressure  The patient was given detailed oral and written instructions on postoperative care as detailed in consent  The patient left in good medical condition  POSTOP DISCUSSION DISCUSSION AND INSTRUCTIONS FOR PATIENT      Rationale for Procedure  A skin excision allows the dermatologist to remove a lesion  The procedure involves a local numbing medication and removing the entire lesion  Typically, the lesion is being removed because it is atypical, traumatized, or for significant appearance reasons  The area will be open like a brush burn and allowed to heal    There will be no sutures  Tissue is sent to pathologist who will reconfirm diagnosis and verify completeness of lesion removal     Description of Procedure  We would like to perform a skin excision today  A local anesthetic, similar to the kind that a dentist uses when filling a cavity, will be injected with a very small needle into the skin area to be sampled  The injected skin and tissue underneath should go to sleep and become numbed so that no further pain should be felt  A scalpel will be used to cut around the lesion and tissue will be submitted to pathology for examination  Depending on the diagnosis the lesion will be excised with a certain amount of normal skin to help assure completeness of lesion removal   The physician will discuss in advance the anticipated size and extent of removal    Bleeding will occur, but it will stopped with direct pressure, sutures, and electrocautery      Surgical Vaseline-type ointment will also applied after the procedure to help create a barrier between the wound and the outside world  Risks and Potential Complications  The advantage of a skin excision is that it allows us to remove a problem lesion quickly  Although this usually permits the lesion to heal as soon as possible with the least scarring, there are some risks and potential complications that include but are not limited to the following:  - Some bleeding is normal at time of procedure and some bleeding on gauze is normal  the first few days after surgery  Profuse bleeding and bleeding with swelling and pain should be reported as detailed  below  - Infection is uncommon in skin surgery  Infection should be reported and is indicated by pain, redness, and discharge of purulent material   - Some dull to at time sharp pain could occur initially the day after surgery  Persistent pain or increasing pain days after surgery is not expected and should be reported  - Every effort is made to minimize scar, but location, size, and genetics do play a role in scar appearance  A surgical wound does not achieve its optimal appearance until 6 months  There are several treatments available if scarring would be problematic including scar creams, silicone pad, laser and scar revision   - Skin discoloration can occur especially in people of color  Its important to avoid sun on wound in first 6 months after surgery  Treatment is available if pigment is problematic   - Incomplete removal of the lesion or recurrence of lesion can occur and this would then require further treatment and more surgery   - Nerve Damage/Numbness/Loss of Function is very rare, but is most likely to occur if lesion is large or if it is in a high risk location  - Allergic Reaction to lidocaine is rare  More commonly,  epinephrine is used  with the lidocaine  Occasionally, epinephrine (aka adrenalin) may cause a brief  feeling of anxiety or jitteriness    - The person at the microscope  (pathologist) may provide additional information that was unexpected  This unexpected finding could provoke the need for additional treatment or evaluation  What You Will Need to Do After the Procedure  1  Keep the area clean and dry the first day  Try NOT to remove the bandage for the first day  2  Gently clean the area with soap and water and apply Vaseline ointment (this is over the counter and not a prescription) to the excision  site for up to 2 weeks  3  Apply a clean appropriately sized bandage to area  Gauze and paper tape are recommended for sensitive skin  4  Return for suture removal as instructed (generally 1 week for the face, 2 weeks for the body)  5  Take Acetaminophen (Tylenol) for discomfort, if no contraindications  Do NOT take Ibuprofen or aspirin unless specifically told to do so by your Dermatologist because these medications can make bleeding worse  6  Call our office immediately for signs of infection: fever, chills, increased redness, warmth, tenderness, discomfort/pain, or pus or foul smell coming from the wound  7  Recommended Mupriocin three times a day for two weeks    8  Recommended [facility]   9  Return for full skin check on April 8th 2022 at 1:00pm    If bleeding is noticed, place a clean cloth over the area and apply firm pressure for thirty minutes  Check the wound ONLY after 30 minutes of direct pressure; do not cheat and sneak a peak, as that does not count  If bleeding persists after 30 minutes of legitimate direct pressure, then try one more round of direct pressure for an additional 10 minutes to the area  Should the bleeding become heavier or not stop after the second attempt, call [facility] directly at [phone] (SKIN) or, if after hours, go to your local Emergency Room/Emergency Department      Given patient's history of significant impetigo, did prescribe him mupirocin ointment to apply until     Scribe Attestation    I,:  Aldo Gentile am acting as a scribe while in the presence of the attending physician :       I,:  Fredo Dixon MD personally performed the services described in this documentation    as scribed in my presence :           The patient was seen and discussed with Dr Lesli Bryson       RTC: 10-14 days for suture removal     Fredo Dixon  Dermatology PGY-3 Resident Physician

## 2022-03-14 DIAGNOSIS — G47.00 INSOMNIA, UNSPECIFIED TYPE: ICD-10-CM

## 2022-03-15 RX ORDER — ZOLPIDEM TARTRATE 10 MG/1
10 TABLET ORAL
Qty: 30 TABLET | Refills: 0 | Status: SHIPPED | OUTPATIENT
Start: 2022-03-15 | End: 2022-04-14 | Stop reason: SDUPTHER

## 2022-03-23 ENCOUNTER — OFFICE VISIT (OUTPATIENT)
Dept: DERMATOLOGY | Facility: CLINIC | Age: 27
End: 2022-03-23

## 2022-03-23 DIAGNOSIS — Z48.02 ENCOUNTER FOR REMOVAL OF SUTURES: Primary | ICD-10-CM

## 2022-03-23 PROCEDURE — RECHECK: Performed by: DERMATOLOGY

## 2022-03-23 NOTE — PROGRESS NOTES
Suture removal    Date/Time: 3/23/2022 3:53 PM  Performed by: Raghavendra Schmidt RN  Authorized by: Lito Gallo MD   Universal Protocol:  Consent: Verbal consent obtained  Risks and benefits: risks, benefits and alternatives were discussed  Consent given by: patient  Timeout called at: 3/23/2022 3:54 PM   Patient understanding: patient states understanding of the procedure being performed  Patient consent: the patient's understanding of the procedure matches consent given  Procedure consent: procedure consent matches procedure scheduled  Relevant documents: relevant documents present and verified  Test results: test results available and properly labeled  Site marked: the operative site was marked  Radiology Images displayed and confirmed  If images not available, report reviewed: imaging studies available  Patient identity confirmed: verbally with patient        Patient location:  Clinic  Location:     Laterality:  Left    Location:  [address] location: Left temple   Procedure details: Tools used:  Suture removal kit    Wound appearance:  No sign(s) of infection, good wound healing, pink and nontender    Number of sutures removed:  6  Post-procedure details:     Post-removal:  No dressing applied (Vaseline applied )    Patient tolerance of procedure: Tolerated well, no immediate complications  Comments:      Patient has a follow up with Dr Marisol Mera on 4/15/2022

## 2022-04-14 DIAGNOSIS — G47.00 INSOMNIA, UNSPECIFIED TYPE: ICD-10-CM

## 2022-04-15 ENCOUNTER — OFFICE VISIT (OUTPATIENT)
Dept: DERMATOLOGY | Facility: CLINIC | Age: 27
End: 2022-04-15
Payer: COMMERCIAL

## 2022-04-15 VITALS — HEIGHT: 69 IN | BODY MASS INDEX: 18.54 KG/M2 | TEMPERATURE: 97.9 F | WEIGHT: 125.2 LBS

## 2022-04-15 DIAGNOSIS — L50.3 DERMOGRAPHISM: Primary | ICD-10-CM

## 2022-04-15 DIAGNOSIS — D22.5 MULTIPLE BENIGN MELANOCYTIC NEVI OF UPPER EXTREMITY, LOWER EXTREMITY, AND TRUNK: ICD-10-CM

## 2022-04-15 DIAGNOSIS — L50.8 CHRONIC URTICARIA: ICD-10-CM

## 2022-04-15 DIAGNOSIS — L81.4 SOLAR LENTIGO: ICD-10-CM

## 2022-04-15 DIAGNOSIS — D22.60 MULTIPLE BENIGN MELANOCYTIC NEVI OF UPPER EXTREMITY, LOWER EXTREMITY, AND TRUNK: ICD-10-CM

## 2022-04-15 DIAGNOSIS — D22.70 MULTIPLE BENIGN MELANOCYTIC NEVI OF UPPER EXTREMITY, LOWER EXTREMITY, AND TRUNK: ICD-10-CM

## 2022-04-15 PROCEDURE — 99213 OFFICE O/P EST LOW 20 MIN: CPT | Performed by: DERMATOLOGY

## 2022-04-15 PROCEDURE — 3008F BODY MASS INDEX DOCD: CPT | Performed by: DERMATOLOGY

## 2022-04-15 PROCEDURE — 1036F TOBACCO NON-USER: CPT | Performed by: DERMATOLOGY

## 2022-04-15 RX ORDER — ZOLPIDEM TARTRATE 10 MG/1
10 TABLET ORAL
Qty: 30 TABLET | Refills: 0 | Status: SHIPPED | OUTPATIENT
Start: 2022-04-15 | End: 2022-05-11 | Stop reason: SDUPTHER

## 2022-04-15 RX ORDER — CETIRIZINE HYDROCHLORIDE 10 MG/1
10 TABLET ORAL 2 TIMES DAILY
Qty: 180 TABLET | Refills: 0 | Status: SHIPPED | OUTPATIENT
Start: 2022-04-15 | End: 2022-07-14

## 2022-04-15 NOTE — PATIENT INSTRUCTIONS
MELANOCYTIC NEVI ("Moles")    Assessment and Plan:  Based on a thorough discussion of this condition and the management approach to it (including a comprehensive discussion of the known risks, side effects and potential benefits of treatment), the patient (family) agrees to implement the following specific plan:   When outside we recommend using a wide brim hat, sunglasses, long sleeve and pants, sunscreen with SPF 38+ with reapplication every 2 hours, or SPF specific clothing    Benign, reassured   Annual skin check     Melanocytic Nevi  Melanocytic nevi ("moles") are tan or brown, raised or flat areas of the skin which have an increased number of melanocytes  Melanocytes are the cells in our body which make pigment and account for skin color  Some moles are present at birth (I e , "congenital nevi"), while others come up later in life (i e , "acquired nevi")  The sun can stimulate the body to make more moles  Sunburns are not the only thing that triggers more moles  Chronic sun exposure can do it too  Clinically distinguishing a healthy mole from melanoma may be difficult, even for experienced dermatologists  The "ABCDE's" of moles have been suggested as a means of helping to alert a person to a suspicious mole and the possible increased risk of melanoma  The suggestions for raising alert are as follows:    Asymmetry: Healthy moles tend to be symmetric, while melanomas are often asymmetric  Asymmetry means if you draw a line through the mole, the two halves do not match in color, size, shape, or surface texture  Asymmetry can be a result of rapid enlargement of a mole, the development of a raised area on a previously flat lesion, scaling, ulceration, bleeding or scabbing within the mole  Any mole that starts to demonstrate "asymmetry" should be examined promptly by a board certified dermatologist      Border: Healthy moles tend to have discrete, even borders    The border of a melanoma often blends into the normal skin and does not sharply delineate the mole from normal skin  Any mole that starts to demonstrate "uneven borders" should be examined promptly by a board certified dermatologist      Color: Healthy moles tend to be one color throughout  Melanomas tend to be made up of different colors ranging from dark black, blue, white, or red  Any mole that demonstrates a color change should be examined promptly by a board certified dermatologist      Diameter: Healthy moles tend to be smaller than 0 6 cm in size; an exception are "congenital nevi" that can be larger  Melanomas tend to grow and can often be greater than 0 6 cm (1/4 of an inch, or the size of a pencil eraser)  This is only a guideline, and many normal moles may be larger than 0 6 cm without being unhealthy  Any mole that starts to change in size (small to bigger or bigger to smaller) should be examined promptly by a board certified dermatologist      Evolving: Healthy moles tend to "stay the same "  Melanomas may often show signs of change or evolution such as a change in size, shape, color, or elevation  Any mole that starts to itch, bleed, crust, burn, hurt, or ulcerate or demonstrate a change or evolution should be examined promptly by a board certified dermatologist       DERMATOGRAPHISM    Assessment and Plan:  Based on a thorough discussion of this condition and the management approach to it (including a comprehensive discussion of the known risks, side effects and potential benefits of treatment), the patient (family) agrees to implement the following specific plan:   Take by mouth 1 tablet of Zyrtec 10 mg 2 times daily   Discuss other alternative treatments if no improvements  What is dermographism? Dermographism (aka Dermatographism) is an exaggerated wealing tendency when the skin is stroked  It is the commonest form of physical or inducible urticaria   It is also called dermatographism, dermatographia and dermatographic urticaria  In 25-50% of normal people firm stroking of the skin produces first a white line, then a red line, then slight swelling down the line of the stroke, and a mild red flare in the surrounding skin  In 5% of the population, this response is exaggerated enough to be called dermographism  In only a minority of these does it cause any symptoms  What is the cause of dermographism? The exact cause of dermographic urticaria is unknown  Histamine is the main chemical released by mast cells when the skin is stroked, but other chemical mediators may also be involved  Some patients with severe dermographism may carry an autoantibody to some unknown cutaneous protein  Occasionally dermographism is triggered by an allergy to some external agent such as penicillin, scabies or a worm infestation  Most people with dermographism do not have an allergy  Who gets dermographism? Dermographism can appear at any age, including children, but it is most common in young adults   Dermographism can occur with other types of urticaria (hives) including those due to cold or pressure   An association with thyroid disease has been noted, but is likely a reflection of an underlying tendency to autoimmune disease and does not appear to have a causative relationship  People with dermographism are usually otherwise healthy  What are the clinical features of dermographism? The onset of dermographism is usually gradual, but in some, the condition develops over a few days  Aggravating factors may include:   Hot conditions, for example, a warm bath, shower or bed    Towelling after bathing    After exercise, especially if it is accompanied by knocks, such as occur in rugby, wrestling or boxing    Minor pressure from clothing, chair seats, working with tools, clapping the hands, and other minor forms of pressure on the skin    Nervousness, agitation and worrying situations      Once a few weals develop, subsequent scratching readily starts others in the vicinity   The weals are usually on the surface but some deep extension may occur and giant weals develop   The weals die away rapidly and usually clear after half to one hour  What is the treatment for dermographism? General measures include avoiding the stimuli that set off bouts of itch, where possible  For example:   Choose comfortable, loose clothing    Avoid exposure to very hot water    Pat dry gently after bathing    If suspicious of a drug cause, consider stopping it if safe to do so  Antihistamines usually give good relief from symptoms  Non-sedating options include:   Cetirizine    Loratadine    Fexofenadine    Antihistamines may need to be continued daily for at least several months; intermittent therapy is of less value  Dermographism may also respond to phototherapy  What is the outcome of dermographism? Dermographism may last for months or go on indefinitely  In many patients, however, it clears within a year or two, or at least the wealing is reduced to a degree which no longer causes significant symptoms  LENTIGO    Assessment and Plan:  Based on a thorough discussion of this condition and the management approach to it (including a comprehensive discussion of the known risks, side effects and potential benefits of treatment), the patient (family) agrees to implement the following specific plan:   When outside we recommend using a wide brim hat, sunglasses, long sleeve and pants, sunscreen with SPF 83+ with reapplication every 2 hours, or SPF specific clothing       What is a lentigo? A lentigo is a pigmented flat or slightly raised lesion with a clearly defined edge  Unlike an ephelis (freckle), it does not fade in the winter months  There are several kinds of lentigo  The name lentigo originally referred to its appearance resembling a small lentil   The plural of lentigo is lentigines, although lentigos is also in common use     Who gets lentigines? Lentigines can affect males and females of all ages and races  Solar lentigines are especially prevalent in fair skinned adults  Lentigines associated with syndromes are present at birth or arise during childhood  What causes lentigines? Common forms of lentigo are due to exposure to ultraviolet radiation:   Sun damage including sunburn    Indoor tanning    Phototherapy, especially photochemotherapy (PUVA)    Ionizing radiation, eg radiation therapy, can also cause lentigines  Several familial syndromes associated with widespread lentigines originate from mutations in Norberto-MAP kinase, mTOR signaling and PTEN pathways  What is the treatment for lentigines? Most lentigines are left alone  Attempts to lighten them may not be successful  The following approaches are used:   SPF 50+ broad-spectrum sunscreen    Hydroquinone bleaching cream    Alpha hydroxy acids    Vitamin C    Retinoids    Azelaic acid    Chemical peels  Individual lesions can be permanently removed using:   Cryotherapy    Intense pulsed light    Pigment lasers    How can lentigines be prevented? Lentigines associated with exposure ultraviolet radiation can be prevented by very careful sun protection  Clothing is more successful at preventing new lentigines than are sunscreens  What is the outlook for lentigines? Lentigines usually persist  They may increase in number with age and sun exposure  Some in sun-protected sites may fade and disappear

## 2022-04-15 NOTE — PROGRESS NOTES
[facility] Follow Up Note    Patient Name: Sima Dooley  Encounter Date: 4/15/22    Today's Chief Concerns:  Emaline Folds Concern #1:  Recheck mole after excision      Current Medications:    Current Outpatient Medications:     cetirizine (ZyrTEC) 10 mg tablet, Take 10 mg by mouth daily, Disp: , Rfl:     escitalopram (Lexapro) 10 mg tablet, Take 1 tablet (10 mg total) by mouth daily, Disp: 90 tablet, Rfl: 5    pantoprazole (PROTONIX) 40 mg tablet, take 1 tablet by mouth once daily, Disp: 90 tablet, Rfl: 3    zolpidem (AMBIEN) 10 mg tablet, Take 1 tablet (10 mg total) by mouth daily at bedtime as needed for sleep, Disp: 30 tablet, Rfl: 0    famotidine (PEPCID) 40 MG tablet, Take 1 tablet (40 mg total) by mouth daily at bedtime (Patient not taking: Reported on 2/4/2022 ), Disp: 90 tablet, Rfl: 2    hydrOXYzine pamoate (VISTARIL) 25 mg capsule, Take 1 capsule (25 mg total) by mouth 3 (three) times a day as needed for itching (Patient not taking: Reported on 2/4/2022 ), Disp: 30 capsule, Rfl: 3    methylPREDNISolone 4 MG tablet therapy pack, Use as directed on package (Patient not taking: Reported on 1/6/2022 ), Disp: 21 each, Rfl: 0    mupirocin (BACTROBAN) 2 % ointment, Apply topically 3 (three) times a day Until suture removal day (Patient not taking: Reported on 4/15/2022 ), Disp: 30 g, Rfl: 1    nystatin (MYCOSTATIN) 500,000 units/5 mL suspension, Apply 1-2ml to each side of mouth 4x a day for 7 days (Patient not taking: Reported on 1/6/2022 ), Disp: 120 mL, Rfl: 1    CONSTITUTIONAL:   Vitals:    04/15/22 1414   Height: 5' 9" (1 753 m)             Specific Alerts:    Have you been seen by a [facility] Dermatologist in the last 3 years? YES      No Known Allergies    May we call your Preferred Phone number to discuss your specific medical information? YES    May we leave a detailed message that includes your specific medical information?  YES    Have you traveled outside of the [facility] in the past 3 months? No    Do you currently have a pacemaker or defibrillator? No    Do you have any artificial heart valves, joints, plates, screws, rods, stents, pins, etc? No   - If Yes, were any placed within the last 2 years? Do you require any medications prior to a surgical procedure? No   - If Yes, for which procedure? - If Yes, what medications to you require? Are you taking any medications that cause you to bleed more easily ("blood thinners") No    Have you ever experienced a rapid heartbeat with epinephrine? No    Have you ever been treated with "gold" (gold sodium thiomalate) therapy? No    [facility] can help with wrinkles, "laugh lines," facial volume loss, "double chin," "love handles," age spots, and more  Are you interested in learning today about some of the skin enhancement procedures that we offer? (If Yes, please provide more detail) No    Review of Systems:  Have you recently had or currently have any of the following?     · Fever or chills: No  · Night Sweats: No  · Headaches: No  · Weight Gain: No  · Weight Loss: No  · Blurry Vision: No  · Nausea: No  · Vomiting: No  · Diarrhea: No  · Blood in Stool: No  · Abdominal Pain: No  · Itchy Skin: No  · Painful Joints: No  · Swollen Joints: No  · Muscle Pain: No  · Irregular Mole: No  · Sun Burn: No  · Dry Skin: No  · Skin Color Changes: No  · Scar or Keloid: No  · Cold Sores/Fever Blisters: No  · Bacterial Infections/MRSA: No  · Anxiety: No  · Depression: No  · Suicidal or Homicidal Thoughts: No      PSYCH: Normal mood and affect  EYES: Normal conjunctiva  ENT: Normal lips and oral mucosa  CARDIOVASCULAR: No edema  RESPIRATORY: Normal respirations  HEME/LYMPH/IMMUNO:  No regional lymphadenopathy except as noted below in ASSESSMENT AND PLAN BY DIAGNOSIS    FULL ORGAN SYSTEM SKIN EXAM (SKIN)   Hair, Scalp, Ears, Face Normal except as noted below in Assessment   Neck Normal except as noted below in Assessment   Right Arm/Hand/Fingers Normal except as noted below in Assessment   Left Arm/Hand/Fingers Normal except as noted below in Assessment   Chest/Breasts/Axillae Viewed areas Normal except as noted below in Assessment   Abdomen, Umbilicus Normal except as noted below in Assessment   Back/Spine Normal except as noted below in Assessment   Groin/Genitalia/Buttocks Not examined   Right Leg, Foot, Toes Normal except as noted below in Assessment   Left Leg, Foot, Toes Normal except as noted below in Assessment     MELANOCYTIC NEVI ("Moles")    Physical Exam:   Anatomic Location Affected:   Mostly on sun-exposed areas of the trunk and extremities   Morphological Description:  Scattered, 1-4mm round to ovoid, symmetrical-appearing, even bordered, skin colored to dark brown macules/papules, mostly in sun-exposed areas   Pertinent Positives:   Pertinent Negatives: Additional History of Present Condition:      Assessment and Plan:  Based on a thorough discussion of this condition and the management approach to it (including a comprehensive discussion of the known risks, side effects and potential benefits of treatment), the patient (family) agrees to implement the following specific plan:   When outside we recommend using a wide brim hat, sunglasses, long sleeve and pants, sunscreen with SPF 21+ with reapplication every 2 hours, or SPF specific clothing    Benign, reassured   Annual skin check     Melanocytic Nevi  Melanocytic nevi ("moles") are tan or brown, raised or flat areas of the skin which have an increased number of melanocytes  Melanocytes are the cells in our body which make pigment and account for skin color  Some moles are present at birth (I e , "congenital nevi"), while others come up later in life (i e , "acquired nevi")  The sun can stimulate the body to make more moles  Sunburns are not the only thing that triggers more moles  Chronic sun exposure can do it too       Clinically distinguishing a healthy mole from melanoma may be difficult, even for experienced dermatologists  The "ABCDE's" of moles have been suggested as a means of helping to alert a person to a suspicious mole and the possible increased risk of melanoma  The suggestions for raising alert are as follows:    Asymmetry: Healthy moles tend to be symmetric, while melanomas are often asymmetric  Asymmetry means if you draw a line through the mole, the two halves do not match in color, size, shape, or surface texture  Asymmetry can be a result of rapid enlargement of a mole, the development of a raised area on a previously flat lesion, scaling, ulceration, bleeding or scabbing within the mole  Any mole that starts to demonstrate "asymmetry" should be examined promptly by a board certified dermatologist      Border: Healthy moles tend to have discrete, even borders  The border of a melanoma often blends into the normal skin and does not sharply delineate the mole from normal skin  Any mole that starts to demonstrate "uneven borders" should be examined promptly by a board certified dermatologist      Color: Healthy moles tend to be one color throughout  Melanomas tend to be made up of different colors ranging from dark black, blue, white, or red  Any mole that demonstrates a color change should be examined promptly by a board certified dermatologist      Diameter: Healthy moles tend to be smaller than 0 6 cm in size; an exception are "congenital nevi" that can be larger  Melanomas tend to grow and can often be greater than 0 6 cm (1/4 of an inch, or the size of a pencil eraser)  This is only a guideline, and many normal moles may be larger than 0 6 cm without being unhealthy    Any mole that starts to change in size (small to bigger or bigger to smaller) should be examined promptly by a board certified dermatologist      Evolving: Healthy moles tend to "stay the same "  Melanomas may often show signs of change or evolution such as a change in size, shape, color, or elevation  Any mole that starts to itch, bleed, crust, burn, hurt, or ulcerate or demonstrate a change or evolution should be examined promptly by a board certified dermatologist       CHRONIC URTICARIA  DERMATOGRAPHISM    Physical Exam:   Anatomic Location Affected:  Trunk and extremities   Morphological Description: Edematous wheal on erythematous base after light stroking of skin    History of Present Condition:  Taking zyrtec as needed  Patient reports he has had "hives" on and off for the past 2 years  States they occur sometimes after a warm shower or on his hands during work (patient lifts objects during work)  Assessment and Plan:  Based on a thorough discussion of this condition and the management approach to it (including a comprehensive discussion of the known risks, side effects and potential benefits of treatment), the patient (family) agrees to implement the following specific plan:   Discussed that it is hard to pinpoint a cause of chronic urticaria  Discussed that it can be induced by triggers such as heat or pressure  Patient instructed to note any specific triggers   Take by mouth 1 tablet of Zyrtec 10 mg daily  If no improvement in symptoms, can titrate up to 10 mg twice daily, and even up to 20 mg twice daily  Discussed side effects of drowsiness at higher doses   Discuss other alternative treatments if no improvements including omalizumab  LENTIGINES    Physical Exam:   Anatomic Location Affected:  Mostly sun exposed areas   Morphological Description:  Light brown macules   Pertinent Positives:   Pertinent Negatives:     Additional History of Present Condition:      Assessment and Plan:  Based on a thorough discussion of this condition and the management approach to it (including a comprehensive discussion of the known risks, side effects and potential benefits of treatment), the patient (family) agrees to implement the following specific plan:   When outside we recommend using a wide brim hat, sunglasses, long sleeve and pants, sunscreen with SPF 87+ with reapplication every 2 hours, or SPF specific clothing     Scribe Attestation    I,:  Tony Kang am acting as a scribe while in the presence of the attending physician :       I,:  Shraddha Cortez MD personally performed the services described in this documentation    as scribed in my presence :

## 2022-04-15 NOTE — TELEPHONE ENCOUNTER
03/15/2022  03/15/2022 Zolpidem Tartrate (Tablet)  30 0 30 10 MG NA SHALINI GABRIELQoopl  [employer]

## 2022-05-09 ENCOUNTER — TELEPHONE (OUTPATIENT)
Dept: FAMILY MEDICINE CLINIC | Facility: CLINIC | Age: 27
End: 2022-05-09

## 2022-05-09 NOTE — TELEPHONE ENCOUNTER
Patient wanted to make you aware that he tested positive for COVID today    Symptoms began last night    Sore throat, slight cough, headache, pressure in sinus area, slight chills but no fever    He is vaccinated and boosted     So far he's just taken Tylenol for medication

## 2022-05-10 ENCOUNTER — TELEMEDICINE (OUTPATIENT)
Dept: FAMILY MEDICINE CLINIC | Facility: CLINIC | Age: 27
End: 2022-05-10
Payer: COMMERCIAL

## 2022-05-10 DIAGNOSIS — U07.1 COVID-19: Primary | ICD-10-CM

## 2022-05-10 DIAGNOSIS — U07.1 COVID-19: ICD-10-CM

## 2022-05-10 PROCEDURE — 99213 OFFICE O/P EST LOW 20 MIN: CPT | Performed by: FAMILY MEDICINE

## 2022-05-10 PROCEDURE — 1036F TOBACCO NON-USER: CPT | Performed by: FAMILY MEDICINE

## 2022-05-10 RX ORDER — DEXTROMETHORPHAN HYDROBROMIDE AND PROMETHAZINE HYDROCHLORIDE 15; 6.25 MG/5ML; MG/5ML
5 SOLUTION ORAL 4 TIMES DAILY PRN
Qty: 180 ML | Refills: 0 | Status: SHIPPED | OUTPATIENT
Start: 2022-05-10

## 2022-05-10 RX ORDER — AZITHROMYCIN 250 MG/1
TABLET, FILM COATED ORAL
Qty: 6 TABLET | Refills: 0 | Status: SHIPPED | OUTPATIENT
Start: 2022-05-10 | End: 2022-05-14

## 2022-05-10 RX ORDER — BROMPHENIRAMINE MALEATE, PSEUDOEPHEDRINE HYDROCHLORIDE, AND DEXTROMETHORPHAN HYDROBROMIDE 2; 30; 10 MG/5ML; MG/5ML; MG/5ML
10 SYRUP ORAL 4 TIMES DAILY PRN
Qty: 180 ML | Refills: 0 | Status: SHIPPED | OUTPATIENT
Start: 2022-05-10 | End: 2022-05-10

## 2022-05-10 NOTE — PROGRESS NOTES
COVID-19 Outpatient Progress Note    Assessment/Plan:    Problem List Items Addressed This Visit     None         Disposition:     Patient has COVID-19 infection  Based off CDC guidelines, they were recommended to isolate for 5 days from the date of the positive test  If they remain asymptomatic, isolation may be ended followed by 5 days of wearing a mask when around othes to minimize risk of infecting others  If they have a fever, continue to stay home until fever resolves for at least 24 hours  I have spent 15 minutes directly with the patient  Greater than 50% of this time was spent in counseling/coordination of care regarding: diagnostic results, prognosis, risks and benefits of treatment options, instructions for management, patient and family education, importance of treatment compliance, risk factor reductions and impressions  Encounter provider Zaida Ruiz DO    Provider located at [address] [place] [address] Drive  [address] [id number]    Recent Visits  Date Type Provider Dept   05/09/22 [facility]phone [facility] Bacon[facility] recent visits within past 7 days and meeting all other requirements  Today's Visits  Date Type Provider Dept   05/10/22 Telemedicine [facility]e Bacon [facility] today's visits and meeting all other requirements  Future Appointments  No visits were found meeting these conditions  Showing future appointments within next 150 days and meeting all other requirements     This virtual check-in was done via [facility] and patient was informed that this is a secure, HIPAA-compliant platform  He agrees to proceed  Patient agrees to participate in a virtual check in via telephone or video visit instead of presenting to the office to address urgent/immediate medical needs  Patient is aware this is a billable service      After connecting through [facility], the patient was identified by name and date of birth  Madhuri Scruggs was informed that this was a telemedicine visit and that the exam was being conducted confidentially over secure lines  My office door was closed  No one else was in the room  Madhuri Scruggs acknowledged consent and understanding of privacy and security of the telemedicine visit  I informed the patient that I have reviewed his record in Epic and presented the opportunity for him to ask any questions regarding the visit today  The patient agreed to participate  Verification of patient location:  Patient is located in the following state in which I hold an active license: [place]    Subjective:   Madhuri Scruggs is a 24 y o  male who has been screened for COVID-19  Symptom change since last report: improving  Patient's symptoms include fever, nasal congestion, sore throat and headache  Patient denies chills, fatigue, malaise, rhinorrhea, anosmia, loss of taste, cough, shortness of breath, chest tightness, abdominal pain, nausea, vomiting, diarrhea and myalgias  - Date of symptom onset: 5/9/2022  - Date of positive COVID-19 test: 5/9/2022  COVID-19 vaccination status: Fully vaccinated (primary series)    Jacqueline Spencer has been staying home and has isolated themselves in his home  He is taking care to not share personal items and is cleaning all surfaces that are touched often, like counters, tabletops, and doorknobs using household cleaning sprays or wipes  He is wearing a mask when he leaves his room       Lab Results   Component Value Date    SARSCOV2 Negative 10/10/2021     Past Medical History:   Diagnosis Date    Anxiety      Past Surgical History:   Procedure Laterality Date    WISDOM TOOTH EXTRACTION       Current Outpatient Medications   Medication Sig Dispense Refill    cetirizine (ZyrTEC) 10 mg tablet Take 1 tablet (10 mg total) by mouth 2 (two) times a day 180 tablet 0    escitalopram (Lexapro) 10 mg tablet Take 1 tablet (10 mg total) by mouth daily 90 tablet 5  famotidine (PEPCID) 40 MG tablet Take 1 tablet (40 mg total) by mouth daily at bedtime (Patient not taking: Reported on 2/4/2022 ) 90 tablet 2    hydrOXYzine pamoate (VISTARIL) 25 mg capsule Take 1 capsule (25 mg total) by mouth 3 (three) times a day as needed for itching (Patient not taking: Reported on 2/4/2022 ) 30 capsule 3    methylPREDNISolone 4 MG tablet therapy pack Use as directed on package (Patient not taking: Reported on 1/6/2022 ) 21 each 0    mupirocin (BACTROBAN) 2 % ointment Apply topically 3 (three) times a day Until suture removal day (Patient not taking: Reported on 4/15/2022 ) 30 g 1    nystatin (MYCOSTATIN) 500,000 units/5 mL suspension Apply 1-2ml to each side of mouth 4x a day for 7 days (Patient not taking: Reported on 1/6/2022 ) 120 mL 1    pantoprazole (PROTONIX) 40 mg tablet take 1 tablet by mouth once daily 90 tablet 3    zolpidem (AMBIEN) 10 mg tablet Take 1 tablet (10 mg total) by mouth daily at bedtime as needed for sleep 30 tablet 0     No current facility-administered medications for this visit  No Known Allergies    Review of Systems   Constitutional: Positive for fever  Negative for chills and fatigue  HENT: Positive for congestion, sinus pressure and sore throat  Negative for rhinorrhea and sinus pain  Respiratory: Negative for cough, chest tightness and shortness of breath  Gastrointestinal: Negative for abdominal pain, diarrhea, nausea and vomiting  Musculoskeletal: Negative for myalgias  Neurological: Positive for headaches  Negative for dizziness  All other systems reviewed and are negative  Objective: There were no vitals filed for this visit  Physical Exam  Vitals reviewed  Constitutional:       Appearance: Normal appearance  He is not ill-appearing  Eyes:      General:         Right eye: No discharge  Left eye: No discharge  Pulmonary:      Effort: Pulmonary effort is normal  No respiratory distress     Skin:     Findings: No rash  Neurological:      Mental Status: He is alert and oriented to person, place, and time  Mental status is at baseline  Psychiatric:         Mood and Affect: Mood normal          Behavior: Behavior normal          Thought Content: Thought content normal          Judgment: Judgment normal          VIRTUAL VISIT DISCLAIMER    Abril Cartwright verbally agrees to participate in [employer]  Pt is aware that [employer] could be limited without vital signs or the ability to perform a full hands-on physical exam  Tyler Yang understands he or the provider may request at any time to terminate the video visit and request the patient to seek care or treatment in person

## 2022-05-11 DIAGNOSIS — G47.00 INSOMNIA, UNSPECIFIED TYPE: ICD-10-CM

## 2022-05-11 RX ORDER — ZOLPIDEM TARTRATE 10 MG/1
10 TABLET ORAL
Qty: 30 TABLET | Refills: 0 | Status: SHIPPED | OUTPATIENT
Start: 2022-05-11 | End: 2022-06-10 | Stop reason: SDUPTHER

## 2022-05-11 NOTE — TELEPHONE ENCOUNTER
04/15/2022  04/15/2022 Zolpidem Tartrate (Tablet)  30 0 30 10 MG NA SHALINI VILLEGAS  [facility]

## 2022-06-10 DIAGNOSIS — G47.00 INSOMNIA, UNSPECIFIED TYPE: ICD-10-CM

## 2022-06-10 RX ORDER — ZOLPIDEM TARTRATE 10 MG/1
10 TABLET ORAL
Qty: 30 TABLET | Refills: 0 | Status: SHIPPED | OUTPATIENT
Start: 2022-06-10 | End: 2022-07-08 | Stop reason: SDUPTHER

## 2022-06-10 NOTE — TELEPHONE ENCOUNTER
05/13/2022 05/11/2022 Zolpidem Tartrate (Tablet)  30 0 30 10 MG NA SHALINI GABRIEL  [facility]  [employer]

## 2022-06-11 ENCOUNTER — TELEPHONE (OUTPATIENT)
Dept: OTHER | Facility: OTHER | Age: 27
End: 2022-06-11

## 2022-06-13 ENCOUNTER — OFFICE VISIT (OUTPATIENT)
Dept: DERMATOLOGY | Facility: CLINIC | Age: 27
End: 2022-06-13
Payer: COMMERCIAL

## 2022-06-13 VITALS — WEIGHT: 127 LBS | HEIGHT: 69 IN | TEMPERATURE: 97.2 F | BODY MASS INDEX: 18.81 KG/M2

## 2022-06-13 DIAGNOSIS — L30.9 DERMATITIS: Primary | ICD-10-CM

## 2022-06-13 PROCEDURE — 3008F BODY MASS INDEX DOCD: CPT | Performed by: DERMATOLOGY

## 2022-06-13 PROCEDURE — 99213 OFFICE O/P EST LOW 20 MIN: CPT | Performed by: DERMATOLOGY

## 2022-06-13 PROCEDURE — 1036F TOBACCO NON-USER: CPT | Performed by: DERMATOLOGY

## 2022-06-13 RX ORDER — CLOBETASOL PROPIONATE 0.5 MG/G
OINTMENT TOPICAL 2 TIMES DAILY
Qty: 15 G | Refills: 0 | Status: SHIPPED | OUTPATIENT
Start: 2022-06-13

## 2022-06-13 NOTE — PROGRESS NOTES
[facility] Follow Up Note    Patient Name: Mary Egan  Encounter Date: 06/13/22    Today's Chief Concerns:  Crispinal Garosamaria Concern #1:   Blisters on finger        Current Medications:    Current Outpatient Medications:     cetirizine (ZyrTEC) 10 mg tablet, Take 1 tablet (10 mg total) by mouth 2 (two) times a day, Disp: 180 tablet, Rfl: 0    escitalopram (Lexapro) 10 mg tablet, Take 1 tablet (10 mg total) by mouth daily, Disp: 90 tablet, Rfl: 5    zolpidem (AMBIEN) 10 mg tablet, Take 1 tablet (10 mg total) by mouth daily at bedtime as needed for sleep, Disp: 30 tablet, Rfl: 0    famotidine (PEPCID) 40 MG tablet, Take 1 tablet (40 mg total) by mouth daily at bedtime (Patient not taking: Reported on 2/4/2022 ), Disp: 90 tablet, Rfl: 2    hydrOXYzine pamoate (VISTARIL) 25 mg capsule, Take 1 capsule (25 mg total) by mouth 3 (three) times a day as needed for itching (Patient not taking: Reported on 2/4/2022 ), Disp: 30 capsule, Rfl: 3    methylPREDNISolone 4 MG tablet therapy pack, Use as directed on package (Patient not taking: Reported on 1/6/2022 ), Disp: 21 each, Rfl: 0    mupirocin (BACTROBAN) 2 % ointment, Apply topically 3 (three) times a day Until suture removal day (Patient not taking: Reported on 4/15/2022 ), Disp: 30 g, Rfl: 1    nystatin (MYCOSTATIN) 500,000 units/5 mL suspension, Apply 1-2ml to each side of mouth 4x a day for 7 days (Patient not taking: Reported on 1/6/2022 ), Disp: 120 mL, Rfl: 1    pantoprazole (PROTONIX) 40 mg tablet, take 1 tablet by mouth once daily (Patient not taking: Reported on 6/13/2022), Disp: 90 tablet, Rfl: 3    Promethazine-DM (PHENERGAN-DM) 6 25-15 mg/5 mL oral syrup, Take 5 mL by mouth 4 (four) times a day as needed for cough (Patient not taking: Reported on 6/13/2022), Disp: 180 mL, Rfl: 0    CONSTITUTIONAL:   Vitals:    06/13/22 0910   Temp: (!) 97 2 °F (36 2 °C)   TempSrc: Temporal   Weight: 57 6 kg (127 lb)   Height: 5' 9" (1 753 m)       Specific Alerts:    Have you been seen by a [facility] Dermatologist in the last 3 years? YES    Are you pregnant or planning to become pregnant? No    Are you currently or planning to be nursing or breast feeding? No    No Known Allergies    May we call your Preferred Phone number to discuss your specific medical information? YES    May we leave a detailed message that includes your specific medical information? YES    Have you traveled outside of the [facility] in the past 3 months? No    Do you currently have a pacemaker or defibrillator? No    Do you have any artificial heart valves, joints, plates, screws, rods, stents, pins, etc? No   - If Yes, were any placed within the last 2 years? Do you require any medications prior to a surgical procedure? No      Are you taking any medications that cause you to bleed more easily ("blood thinners") No    Have you ever experienced a rapid heartbeat with epinephrine? No    Have you ever been treated with "gold" (gold sodium thiomalate) therapy? No    [facility] can help with wrinkles, "laugh lines," facial volume loss, "double chin," "love handles," age spots, and more  Are you interested in learning today about some of the skin enhancement procedures that we offer? (If Yes, please provide more detail) No    Review of Systems:  Have you recently had or currently have any of the following?     · Fever or chills: No  · Night Sweats: No  · Headaches: No  · Weight Gain: No  · Weight Loss: No  · Blurry Vision: No  · Nausea: No  · Vomiting: No  · Diarrhea: No  · Blood in Stool: No  · Abdominal Pain: No  · Itchy Skin: YES  · Painful Joints: No  · Swollen Joints: No  · Muscle Pain: No  · Irregular Mole: No  · Sun Burn: No  · Dry Skin: No  · Skin Color Changes: No  · Scar or Keloid: YES  · Cold Sores/Fever Blisters: No  · Bacterial Infections/MRSA: No  · Anxiety: YES  · Depression: No  · Suicidal or Homicidal Thoughts: No      PSYCH: Normal mood and affect  EYES: Normal conjunctiva  ENT: Normal lips and oral mucosa  CARDIOVASCULAR: No edema  RESPIRATORY: Normal respirations  HEME/LYMPH/IMMUNO:  No regional lymphadenopathy except as noted below in ASSESSMENT AND PLAN BY DIAGNOSIS    FOCUSED ORGAN SYSTEM SKIN EXAM (SKIN)  Right Arm/Hand/Fingers Normal except as noted below in Assessment   Left Arm/Hand/Fingers Normal except as noted below in Assessment       DERMATITIS, CONTACT VS DYSHIDROTIC  Physical Exam:   Anatomic Location Affected:  Right ring finger and 4th finger, left forearm, left arm   Morphological Description:  See photos   Pertinent Positives:   Pertinent Negatives: Additional History of Present Condition:  Patient noted that spots were very itchy and then blisters started; applied mupirocin 2% ointment and cortisone ointment; wasn't working      Assessment and Plan:  Based on a thorough discussion of this condition and the management approach to it (including a comprehensive discussion of the known risks, side effects and potential benefits of treatment), the patient (family) agrees to implement the following specific plan:   Apply clobetasol ointment use twice daily for up to 2  weeks, avoid body folds, eyes                Scribe Attestation    I,:  Ronnell Robertson am acting as a scribe while in the presence of the attending physician :       I,:  Nicolasa Tapia MD personally performed the services described in this documentation    as scribed in my presence :

## 2022-06-13 NOTE — PATIENT INSTRUCTIONS
DERMATITIS  Assessment and Plan:  Based on a thorough discussion of this condition and the management approach to it (including a comprehensive discussion of the known risks, side effects and potential benefits of treatment), the patient (family) agrees to implement the following specific plan:  Apply Clobetasol ointment use twice daily for up to 2  weeks, avoid body folds, eyes

## 2022-07-08 DIAGNOSIS — G47.00 INSOMNIA, UNSPECIFIED TYPE: ICD-10-CM

## 2022-07-08 RX ORDER — ZOLPIDEM TARTRATE 10 MG/1
10 TABLET ORAL
Qty: 30 TABLET | Refills: 0 | Status: SHIPPED | OUTPATIENT
Start: 2022-07-08 | End: 2022-08-08 | Stop reason: SDUPTHER

## 2022-07-08 NOTE — TELEPHONE ENCOUNTER
1  [id number] 06/10/2022  06/10/2022 Zolpidem Tartrate (Tablet)  30 0 30 10 MG NA SHALINI BROADT  [employer]  [employer] 0 / 0 [place]    1  [id number] 05/13/2022 05/11/2022 Zolpidem Tartrate (Tablet)  30 0 30 10 MG NA SHALINI [employer] 00 / 00 [place]    1  [id number] 04/15/2022  04/15/2022 Zolpidem Tartrate (Tablet)  30 0 30 10 MG NA SHALINI BROADT  [employer]  [employer] 00 / 00 [place]

## 2022-09-16 DIAGNOSIS — L50.3 DERMOGRAPHISM: ICD-10-CM

## 2022-09-16 DIAGNOSIS — L50.8 CHRONIC URTICARIA: ICD-10-CM

## 2022-09-16 RX ORDER — CETIRIZINE HYDROCHLORIDE 10 MG/1
TABLET ORAL
Qty: 180 TABLET | Refills: 0 | Status: SHIPPED | OUTPATIENT
Start: 2022-09-16

## 2022-11-07 DIAGNOSIS — G47.00 INSOMNIA, UNSPECIFIED TYPE: ICD-10-CM

## 2022-11-09 RX ORDER — ZOLPIDEM TARTRATE 10 MG/1
10 TABLET ORAL
Qty: 30 TABLET | Refills: 0 | Status: SHIPPED | OUTPATIENT
Start: 2022-11-09

## 2022-12-05 DIAGNOSIS — G47.00 INSOMNIA, UNSPECIFIED TYPE: ICD-10-CM

## 2022-12-06 NOTE — TELEPHONE ENCOUNTER
1  [id number] 11/09/2022 11/09/2022 Zolpidem Tartrate (Tablet)  30 0 30 10 MG NA SHALINI [employer]  [facility] 0 / 0 [address]    1  [id number] 10/06/2022  10/03/2022 Zolpidem Tartrate (Tablet)  30 0 30 10 MG NA Eloy [employer]  [facility] 0 / 0 [address]    1  [id number] 09/08/2022 09/08/2022 Zolpidem Tartrate (Tablet)  30 0 30 10 MG NA ARIES NEVILLE  [employer] 0 / 0 [place]

## 2022-12-07 RX ORDER — ZOLPIDEM TARTRATE 10 MG/1
10 TABLET ORAL
Qty: 30 TABLET | Refills: 0 | Status: SHIPPED | OUTPATIENT
Start: 2022-12-07

## 2022-12-31 DIAGNOSIS — K21.00 GASTROESOPHAGEAL REFLUX DISEASE WITH ESOPHAGITIS: ICD-10-CM

## 2023-01-03 RX ORDER — PANTOPRAZOLE SODIUM 40 MG/1
TABLET, DELAYED RELEASE ORAL
Qty: 90 TABLET | Refills: 3 | Status: SHIPPED | OUTPATIENT
Start: 2023-01-03 | End: 2023-01-04

## 2023-01-04 DIAGNOSIS — K21.00 GASTROESOPHAGEAL REFLUX DISEASE WITH ESOPHAGITIS: ICD-10-CM

## 2023-01-04 DIAGNOSIS — G47.00 INSOMNIA, UNSPECIFIED TYPE: ICD-10-CM

## 2023-01-04 RX ORDER — PANTOPRAZOLE SODIUM 40 MG/1
TABLET, DELAYED RELEASE ORAL
Qty: 90 TABLET | Refills: 3 | Status: SHIPPED | OUTPATIENT
Start: 2023-01-04

## 2023-01-04 NOTE — TELEPHONE ENCOUNTER
[id number] 12/09/2022 12/07/2022 Zolpidem Tartrate (Tablet)  30 0 30 10 MG NA SHALINI BRO[employer]T  [employer]  [facility] [facility] 0 / 0 [place]     1  [id number] 11/09/2022 11/09/2022 Zolpidem Tartrate (Tablet)  30 0 30 10 MG NA SHALINI [employer]  [employer]  [facility] [facility] 0 / 0 [place]    1  [id number] 10/06/2022  10/03/2022 Zolpidem Tartrate (Tablet)  30 0 30 10 MG NA Eloy [employer]  [facility] 0 / 0 [place]    1  [id number] 09/08/2022 09/08/2022 Zolpidem Tartrate (Tablet)  30 0 30 10 MG NA ARIES NEVILLE  [email or web address] 0 / 0 [place]

## 2023-01-05 RX ORDER — ZOLPIDEM TARTRATE 10 MG/1
10 TABLET ORAL
Qty: 30 TABLET | Refills: 0 | Status: SHIPPED | OUTPATIENT
Start: 2023-01-05

## 2023-02-03 DIAGNOSIS — G47.00 INSOMNIA, UNSPECIFIED TYPE: ICD-10-CM

## 2023-02-03 RX ORDER — ZOLPIDEM TARTRATE 10 MG/1
10 TABLET ORAL
Qty: 30 TABLET | Refills: 0 | Status: SHIPPED | OUTPATIENT
Start: 2023-02-03

## 2023-03-02 ENCOUNTER — OFFICE VISIT (OUTPATIENT)
Dept: FAMILY MEDICINE CLINIC | Facility: CLINIC | Age: 28
End: 2023-03-02

## 2023-03-02 VITALS
OXYGEN SATURATION: 98 % | RESPIRATION RATE: 18 BRPM | SYSTOLIC BLOOD PRESSURE: 110 MMHG | WEIGHT: 128 LBS | HEIGHT: 69 IN | DIASTOLIC BLOOD PRESSURE: 78 MMHG | HEART RATE: 71 BPM | BODY MASS INDEX: 18.96 KG/M2 | TEMPERATURE: 97.2 F

## 2023-03-02 DIAGNOSIS — J02.9 SORE THROAT: ICD-10-CM

## 2023-03-02 DIAGNOSIS — K21.00 GASTROESOPHAGEAL REFLUX DISEASE WITH ESOPHAGITIS: Primary | ICD-10-CM

## 2023-03-02 DIAGNOSIS — G43.809 OTHER MIGRAINE WITHOUT STATUS MIGRAINOSUS, NOT INTRACTABLE: ICD-10-CM

## 2023-03-02 DIAGNOSIS — N32.81 OAB (OVERACTIVE BLADDER): ICD-10-CM

## 2023-03-02 LAB — S PYO AG THROAT QL: NEGATIVE

## 2023-03-02 RX ORDER — RIZATRIPTAN BENZOATE 10 MG/1
10 TABLET ORAL ONCE AS NEEDED
Qty: 10 TABLET | Refills: 5 | Status: SHIPPED | OUTPATIENT
Start: 2023-03-02

## 2023-03-02 RX ORDER — PANTOPRAZOLE SODIUM 40 MG/1
40 TABLET, DELAYED RELEASE ORAL 2 TIMES DAILY
Qty: 60 TABLET | Refills: 3 | Status: SHIPPED | OUTPATIENT
Start: 2023-03-02

## 2023-03-06 DIAGNOSIS — G47.00 INSOMNIA, UNSPECIFIED TYPE: ICD-10-CM

## 2023-03-06 RX ORDER — ZOLPIDEM TARTRATE 10 MG/1
10 TABLET ORAL
Qty: 30 TABLET | Refills: 0 | Status: SHIPPED | OUTPATIENT
Start: 2023-03-06

## 2023-03-08 NOTE — PROGRESS NOTES
Patient ID: Aura Castro is a 34 y o  male  HPI: 34 y o male presents for evaluation of GERD, OAB, and migraine  The GERD is well controlled, but he would like to see a urologist and he needs a rescue med for migraines        SUBJECTIVE    Family History   Problem Relation Age of Onset   • Diabetes Mother    • Diabetes Maternal Grandmother    • Melanoma Maternal Grandfather      Social History     Socioeconomic History   • Marital status: Single     Spouse name: Not on file   • Number of children: Not on file   • Years of education: Not on file   • Highest education level: Not on file   Occupational History   • Not on file   Tobacco Use   • Smoking status: Former   • Smokeless tobacco: Never   Vaping Use   • Vaping Use: Every day   • Substances: Nicotine   Substance and Sexual Activity   • Alcohol use: Yes     Comment: socially   • Drug use: Yes     Types: Marijuana   • Sexual activity: Yes     Partners: Female   Other Topics Concern   • Not on file   Social History Narrative   • Not on file     Social Determinants of Health     Financial Resource Strain: Not on file   Food Insecurity: Not on file   Transportation Needs: Not on file   Physical Activity: Not on file   Stress: Not on file   Social Connections: Not on file   Intimate Partner Violence: Not on file   Housing Stability: Not on file     Past Medical History:   Diagnosis Date   • Anxiety      Past Surgical History:   Procedure Laterality Date   • SKIN BIOPSY     • WISDOM TOOTH EXTRACTION       No Known Allergies    Current Outpatient Medications:   •  cetirizine (ZyrTEC) 10 mg tablet, take 1 tablet by mouth twice a day, Disp: 180 tablet, Rfl: 0  •  pantoprazole (PROTONIX) 40 mg tablet, Take 1 tablet (40 mg total) by mouth 2 (two) times a day, Disp: 60 tablet, Rfl: 3  •  rizatriptan (MAXALT) 10 mg tablet, Take 1 tablet (10 mg total) by mouth once as needed for migraine for up to 1 dose may repeat in 2 hours if necessary, Disp: 10 tablet, Rfl: 5  • clobetasol (TEMOVATE) 0 05 % ointment, Apply topically 2 (two) times a day For up to 2  Weeks; avoid body folds, eyes, face , Disp: 15 g, Rfl: 0  •  zolpidem (AMBIEN) 10 mg tablet, Take 1 tablet (10 mg total) by mouth daily at bedtime as needed for sleep, Disp: 30 tablet, Rfl: 0    Review of Systems  Constitutional:     Denies fever, chills ,fatigue ,weakness ,weight loss, weight gain     ENT: Denies earache ,loss of hearing ,nosebleed, nasal discharge,nasal congestion ,sore throat ,hoarseness  Pulmonary: Denies shortness of breath ,cough  ,dyspnea on exertion, orthopnea  ,PND   Cardiovascular:  Denies bradycardia , tachycardia  ,palpations, lower extremity edema leg, claudication  Breast:  Denies new or changing breast lumps ,nipple discharge ,nipple changes  Abdomen:  Denies abdominal pain , anorexia , indigestion, nausea, vomiting, constipation, diarrhea  Musculoskeletal: Denies myalgias, arthralgias, joint swelling, joint stiffness , limb pain, limb swelling  Gu: denies dysuria,+ chronic polyuria+ postvoid dribbling  Skin: Denies skin rash, skin lesion, skin wound, itching, dry skin  Neuro: Denies headache, numbness, tingling, confusion, loss of consciousness, dizziness, vertigo  Psychiatric: Denies feelings of depression, suicidal ideation, anxiety, sleep disturbances    OBJECTIVE  /78 (BP Location: Left arm, Patient Position: Sitting, Cuff Size: Adult)   Pulse 71   Temp (!) 97 2 °F (36 2 °C)   Resp 18   Ht 5' 9" (1 753 m)   Wt 58 1 kg (128 lb)   SpO2 98%   BMI 18 90 kg/m²   Constitutional:   NAD, well appearing and well nourished      ENT:   Conjunctiva and lids: no injection, edema, or discharge     Pupils and iris: KUNAL bilaterally    External inspection of ears and nose: normal without deformities or discharge  Otoscopic exam: Canals patent without erythema         Nasal mucosa, septum and turbinates: Normal or edema or discharge         Oropharynx:  Moist mucosa, normal tongue and tonsils without lesions  No erythema        Pulmonary:Respiratory effort normal rate and rhythm, no increased work of breathing  Auscultation of lungs:  Clear bilaterally with no adventitious breath sounds       Cardiovascular: regular rate and rhythm, S1 and S2, no murmur, no edema and/or varicosities of LE      Abdomen: Soft and non-distended     Positive bowel sounds      No heptomegaly or splenomegaly      Gu: no suprapubic tenderness or CVA tenderness, no urethral discharge  Lymphatic:  No anterior or posterior cervical lymphadenopathy         Musculoskeletal:  Gait and station: Normal gait      Digits and nails normal without clubbing or cyanosis       Inspection/palpation of joints, bones, and muscles:  No joint tenderness, swelling, full active and passive range of motion       Skin: Normal skin turgor and no rashes      Neuro:     Normal reflexes    Psych:   alert and oriented to person, place and time     normal mood and affect       Assessment/Plan:Diagnoses and all orders for this visit:    Gastroesophageal reflux disease with esophagitis  Comments:  ntinue pPI tx  Orders:  -     pantoprazole (PROTONIX) 40 mg tablet; Take 1 tablet (40 mg total) by mouth 2 (two) times a day    OAB (overactive bladder)  -     Ambulatory Referral to Urology; Future    Other migraine without status migrainosus, not intractable  Comments:  Trial witth maxalt tx  Orders:  -     rizatriptan (MAXALT) 10 mg tablet; Take 1 tablet (10 mg total) by mouth once as needed for migraine for up to 1 dose may repeat in 2 hours if necessary    Sore throat  -     POCT rapid strepA        Reviewed with patient plan to treat with above plan      Patient instructed to call in 72 hours if not feeling better or if symptoms worsen

## 2023-03-09 DIAGNOSIS — F41.9 ANXIETY: ICD-10-CM

## 2023-03-09 RX ORDER — ESCITALOPRAM OXALATE 10 MG/1
TABLET ORAL
Qty: 90 TABLET | Refills: 5 | Status: SHIPPED | OUTPATIENT
Start: 2023-03-09

## 2023-03-30 DIAGNOSIS — G47.00 INSOMNIA, UNSPECIFIED TYPE: ICD-10-CM

## 2023-03-30 DIAGNOSIS — G43.809 OTHER MIGRAINE WITHOUT STATUS MIGRAINOSUS, NOT INTRACTABLE: ICD-10-CM

## 2023-03-30 RX ORDER — RIZATRIPTAN BENZOATE 10 MG/1
10 TABLET ORAL ONCE AS NEEDED
Qty: 10 TABLET | Refills: 0 | Status: SHIPPED | OUTPATIENT
Start: 2023-03-30

## 2023-03-31 RX ORDER — ZOLPIDEM TARTRATE 10 MG/1
10 TABLET ORAL
Qty: 30 TABLET | Refills: 0 | Status: SHIPPED | OUTPATIENT
Start: 2023-03-31

## 2023-03-31 NOTE — TELEPHONE ENCOUNTER
1  [id number] 03/06/2023 03/06/2023 Zolpidem Tartrate (Tablet)  30 0 30 10 MG NA SHALINI BRO[employer]  [employer]  [facility] [facility] [facility] 0 / 0 [place]    1  [id number] 02/06/2023 02/03/2023 Zolpidem Tartrate (Tablet)  30 0 30 10 MG NA SHALINI [employer]  [employer]  [facility] [facility] 0 / 0 [place]    1  [id number] 01/09/2023 01/05/2023 Zolpidem Tartrate (Tablet)  30 0 30 10 MG NA SHALINI [employer]  [employer]  [facility] [facility] [facility] 0 / 0 [place]    1  [id number] 12/09/2022 12/07/2022 Zolpidem Tartrate (Tablet)  30 0 30 10 MG NA SHALINI [employer]  [employer]  [facility] [facility] [facility] 0 / 0 [place]    1  [id number] 11/09/2022 11/09/2022 Zolpidem Tartrate (Tablet)  30 0 30 10 MG NA SHALINI [employer]  [employer]  [facility] [facility] [facility] 0 / 0 [place]    1  [id number] 10/06/2022  10/03/2022 Zolpidem Tartrate (Tablet)  30 0 30 10 MG [employer] [facility] 0 / 0 [place]    1  [id number] 09/08/2022 09/08/2022 Zolpidem Tartrate (Tablet)  30 0 30 10 MG [employer] [facility] 0 / 0 [place]    1  [id number] 08/08/2022 08/08/2022 Zolpidem Tartrate (Tablet)  30 0 30 10 MG [employer] [facility] 0 / 0 [place]    1  [id number] 07/11/2022 07/08/2022 Zolpidem Tartrate (Tablet)  30 0 30 10 [facility] [place]    1  [id number] 06/10/2022  06/10/2022 Zolpidem Tartrate (Tablet)  30 0 30 10 MG NA SHALINI BRO[employer]T  [employer]  [employer] 0 / 0 [place]

## 2023-05-01 DIAGNOSIS — G47.00 INSOMNIA, UNSPECIFIED TYPE: ICD-10-CM

## 2023-05-01 RX ORDER — ZOLPIDEM TARTRATE 10 MG/1
10 TABLET ORAL
Qty: 30 TABLET | Refills: 0 | Status: SHIPPED | OUTPATIENT
Start: 2023-05-01

## 2023-05-01 NOTE — TELEPHONE ENCOUNTER
[id number] 04/04/2023 03/31/2023 Zolpidem Tartrate (Tablet)  30 0 30 10 MG NA SHALINI [employer]  [facility] 0 / 0 [place]     1  [id number] 03/06/2023 03/06/2023 Zolpidem Tartrate (Tablet)  30 0 30 10 MG NA [employer]  [facility] 0 / 0 [place]    1  [id number] 02/06/2023 02/03/2023 Zolpidem Tartrate (Tablet)  30 0 30 10 MG NA [employer] [facility] 0 / 0 [place]    1  [id number] 01/09/2023 01/05/2023 Zolpidem Tartrate (Tablet)  30 0 30 10 MG NA [employer]  [facility] 0 / 0 [place]

## 2023-05-03 DIAGNOSIS — K21.00 GASTROESOPHAGEAL REFLUX DISEASE WITH ESOPHAGITIS, UNSPECIFIED WHETHER HEMORRHAGE: Primary | ICD-10-CM

## 2023-06-16 ENCOUNTER — OFFICE VISIT (OUTPATIENT)
Dept: FAMILY MEDICINE CLINIC | Facility: CLINIC | Age: 28
End: 2023-06-16
Payer: COMMERCIAL

## 2023-06-16 VITALS
SYSTOLIC BLOOD PRESSURE: 118 MMHG | OXYGEN SATURATION: 98 % | BODY MASS INDEX: 22.01 KG/M2 | HEIGHT: 69 IN | HEART RATE: 67 BPM | DIASTOLIC BLOOD PRESSURE: 80 MMHG | WEIGHT: 148.6 LBS | TEMPERATURE: 97.8 F

## 2023-06-16 DIAGNOSIS — Z00.00 ANNUAL PHYSICAL EXAM: Primary | ICD-10-CM

## 2023-06-16 DIAGNOSIS — F41.9 ANXIETY: ICD-10-CM

## 2023-06-16 PROCEDURE — 99395 PREV VISIT EST AGE 18-39: CPT | Performed by: FAMILY MEDICINE

## 2023-06-16 RX ORDER — ALPRAZOLAM 0.25 MG/1
TABLET ORAL
Qty: 40 TABLET | Refills: 0 | Status: SHIPPED | OUTPATIENT
Start: 2023-06-16

## 2023-06-16 NOTE — PROGRESS NOTES
ADULT ANNUAL [address]    NAME: Olegario Valencia  AGE: 34 y o  SEX: male  : 1995     DATE: 2023     Assessment and Plan:     Problem List Items Addressed This Visit    None      Immunizations and preventive care screenings were discussed with patient today  Appropriate education was printed on patient's after visit summary  Counseling:  · Exercise: the importance of regular exercise/physical activity was discussed  Recommend exercise 3-5 times per week for at least 30 minutes  No follow-ups on file  Chief Complaint:     Chief Complaint   Patient presents with   • Physical Exam     Noticed a mole right side of face  History of Present Illness:     Adult Annual Physical   Patient here for a comprehensive physical exam  The patient reports no problems  Diet and Physical Activity  · Diet/Nutrition: well balanced diet  · Exercise: no formal exercise  Depression Screening  PHQ-2/9 Depression Screening    Little interest or pleasure in doing things: 0 - not at all  Feeling down, depressed, or hopeless: 0 - not at all  PHQ-2 Score: 0  PHQ-2 Interpretation: Negative depression screen       General Health  · Sleep: sleeps well  · Hearing: normal - bilateral   · Vision: no vision problems  · Dental: regular dental visits  [facility]  · History of STDs?: no      Review of Systems:     Review of Systems   Constitutional: Negative for appetite change, chills and fever  HENT: Negative for ear pain, facial swelling, rhinorrhea, sinus pain, sore throat and trouble swallowing  Eyes: Negative for discharge and redness  Respiratory: Negative for chest tightness, shortness of breath and wheezing  Cardiovascular: Negative for chest pain and palpitations  Gastrointestinal: Negative for abdominal pain, diarrhea, nausea and vomiting  Endocrine: Negative for polyuria     Genitourinary: Negative for dysuria and urgency  Musculoskeletal: Negative for arthralgias and back pain  Skin: Negative for rash  Neurological: Negative for dizziness, weakness and headaches  Hematological: Negative for adenopathy  Psychiatric/Behavioral: Negative for behavioral problems, confusion and sleep disturbance  The patient is nervous/anxious  + intermittent anxiety   All other systems reviewed and are negative       Past Medical History:     Past Medical History:   Diagnosis Date   • Anxiety       Past Surgical History:     Past Surgical History:   Procedure Laterality Date   • SKIN BIOPSY     • WISDOM TOOTH EXTRACTION        Social History:     Social History     Socioeconomic History   • Marital status: Single     Spouse name: None   • Number of children: None   • Years of education: None   • Highest education level: None   Occupational History   • None   Tobacco Use   • Smoking status: Former     Passive exposure: Past   • Smokeless tobacco: Never   Vaping Use   • Vaping Use: Every day   • Substances: Nicotine   Substance and Sexual Activity   • Alcohol use: Yes     Comment: socially   • Drug use: Yes     Types: Marijuana   • Sexual activity: Yes     Partners: Female   Other Topics Concern   • None   Social History Narrative   • None     Social Determinants of Health     Financial Resource Strain: Not on file   Food Insecurity: Not on file   Transportation Needs: Not on file   Physical Activity: Not on file   Stress: Not on file   Social Connections: Not on file   Intimate Partner Violence: Not on file   Housing Stability: Not on file      Family History:     Family History   Problem Relation Age of Onset   • Diabetes Mother    • Diabetes Maternal Grandmother    • Melanoma Maternal Grandfather       Current Medications:     Current Outpatient Medications   Medication Sig Dispense Refill   • cetirizine (ZyrTEC) 10 mg tablet take 1 tablet by mouth twice a day 180 tablet 0   • pantoprazole (PROTONIX) 40 mg tablet Take 1 "tablet (40 mg total) by mouth 2 (two) times a day 60 tablet 3   • rizatriptan (MAXALT) 10 mg tablet Take 1 tablet (10 mg total) by mouth once as needed for migraine for up to 1 dose may repeat in 2 hours if necessary 10 tablet 0   • zolpidem (AMBIEN) 10 mg tablet Take 1 tablet (10 mg total) by mouth daily at bedtime as needed for sleep 30 tablet 0   • clobetasol (TEMOVATE) 0 05 % ointment Apply topically 2 (two) times a day For up to 2  Weeks; avoid body folds, eyes, face  (Patient not taking: Reported on 6/16/2023) 15 g 0   • escitalopram (LEXAPRO) 10 mg tablet take 1 tablet by mouth once daily (Patient not taking: Reported on 6/16/2023) 90 tablet 5     No current facility-administered medications for this visit  Allergies:     No Known Allergies   Physical Exam:     /88 (BP Location: Right arm, Patient Position: Sitting)   Pulse 67   Temp 97 8 °F (36 6 °C)   Ht 5' 9\" (1 753 m)   Wt 67 4 kg (148 lb 9 6 oz)   SpO2 98%   BMI 21 94 kg/m²     Physical Exam  Vitals and nursing note reviewed  Constitutional:       General: He is not in acute distress  Appearance: Normal appearance  He is not ill-appearing or diaphoretic  HENT:      Head: Normocephalic and atraumatic  Right Ear: Tympanic membrane, ear canal and external ear normal       Left Ear: Tympanic membrane, ear canal and external ear normal       Nose: Nose normal  No congestion or rhinorrhea  Mouth/Throat:      Mouth: Mucous membranes are moist       Pharynx: Oropharynx is clear  No posterior oropharyngeal erythema  Eyes:      General:         Right eye: No discharge  Left eye: No discharge  Extraocular Movements: Extraocular movements intact  Conjunctiva/sclera: Conjunctivae normal       Pupils: Pupils are equal, round, and reactive to light  Neck:      Vascular: No carotid bruit  Cardiovascular:      Rate and Rhythm: Normal rate and regular rhythm  Pulses: Normal pulses        Heart sounds: Normal " heart sounds  No murmur heard  Pulmonary:      Effort: Pulmonary effort is normal  No respiratory distress  Breath sounds: Normal breath sounds  No wheezing or rhonchi  Abdominal:      General: Abdomen is flat  Bowel sounds are normal  There is no distension  Palpations: There is no mass  Tenderness: There is no abdominal tenderness  Musculoskeletal:         General: No swelling or deformity  Normal range of motion  Cervical back: Normal range of motion and neck supple  No rigidity  Right lower leg: No edema  Left lower leg: No edema  Lymphadenopathy:      Cervical: No cervical adenopathy  Skin:     General: Skin is warm and dry  Capillary Refill: Capillary refill takes less than 2 seconds  Coloration: Skin is not jaundiced  Findings: No bruising, erythema or rash  Neurological:      General: No focal deficit present  Mental Status: He is alert and oriented to person, place, and time  Cranial Nerves: No cranial nerve deficit  Sensory: No sensory deficit  Gait: Gait normal       Deep Tendon Reflexes: Reflexes normal    Psychiatric:         Mood and Affect: Mood normal          Behavior: Behavior normal          Thought Content:  Thought content normal          Judgment: Judgment normal           Chiquis Braga 3

## 2023-06-29 DIAGNOSIS — G47.00 INSOMNIA, UNSPECIFIED TYPE: ICD-10-CM

## 2023-06-29 RX ORDER — ZOLPIDEM TARTRATE 10 MG/1
10 TABLET ORAL
Qty: 30 TABLET | Refills: 0 | Status: SHIPPED | OUTPATIENT
Start: 2023-06-29

## 2023-06-29 NOTE — TELEPHONE ENCOUNTER
1 [id number] 06/16/2023 06/16/2023 ALPRAZolam (Tablet) 40 0 20 0 25 MG NA SHALINI BROADT [employer] 0 / 0 [place]    1 [id number] 06/01/2023 06/01/2023 Zolpidem Tartrate (Tablet) 30 0 30 10 MG NA [employer] [facility] 0 / 0 [place]    1 [id number] 05/04/2023 05/01/2023 Zolpidem Tartrate (Tablet) 30 0 30 10 MG NA [employer] [facility] [facility] 0 / 0 [place]    1 [id number] 04/04/2023 03/31/2023 Zolpidem Tartrate (Tablet) 30 0 30 10 MG [employer] [facility] 0 / 0 [place]    1 [id number] 03/06/2023 03/06/2023 Zolpidem Tartrate (Tablet) 30 0 30 10 MG [employer] [facility] 0 / 0 [place]    1 [id number] 02/06/2023 02/03/2023 Zolpidem Tartrate (Tablet) 30 0 30 10 MG NA SHALINI BROADT [employer] [facility] 0 / 0 [place]    1 [id number] 01/09/2023 01/05/2023 Zolpidem Tartrate (Tablet) 30 0 30 10 MG [employer] [facility] 0 / 0 [place]    1 [id number] 12/09/2022 12/07/2022 Zolpidem Tartrate (Tablet) 30 0 30 10 MG NA SHALINI BROADT [employer] [facility] 0 / 0 [place]    1 [id number] 11/09/2022 11/09/2022 Zolpidem Tartrate (Tablet) 30 0 30 10 MG NA [employer] [facility] 0 / 0 [place]    1 [id number] 10/06/2022 10/03/2022 Zolpidem Tartrate (Tablet) 30 0 30 10 MG NA [employer] 0 / 0 [place]

## 2023-07-28 ENCOUNTER — TELEPHONE (OUTPATIENT)
Dept: GASTROENTEROLOGY | Facility: CLINIC | Age: 28
End: 2023-07-28

## 2023-07-28 ENCOUNTER — OFFICE VISIT (OUTPATIENT)
Dept: GASTROENTEROLOGY | Facility: CLINIC | Age: 28
End: 2023-07-28
Payer: COMMERCIAL

## 2023-07-28 VITALS
BODY MASS INDEX: 20.88 KG/M2 | WEIGHT: 141 LBS | SYSTOLIC BLOOD PRESSURE: 124 MMHG | HEART RATE: 75 BPM | HEIGHT: 69 IN | DIASTOLIC BLOOD PRESSURE: 89 MMHG

## 2023-07-28 DIAGNOSIS — K21.9 GASTROESOPHAGEAL REFLUX DISEASE, UNSPECIFIED WHETHER ESOPHAGITIS PRESENT: ICD-10-CM

## 2023-07-28 DIAGNOSIS — R11.2 NAUSEA AND VOMITING, UNSPECIFIED VOMITING TYPE: Primary | ICD-10-CM

## 2023-07-28 DIAGNOSIS — R14.0 BLOATED ABDOMEN: ICD-10-CM

## 2023-07-28 PROBLEM — K21.00 GASTROESOPHAGEAL REFLUX DISEASE WITH ESOPHAGITIS: Status: ACTIVE | Noted: 2023-07-28

## 2023-07-28 PROCEDURE — 99244 OFF/OP CNSLTJ NEW/EST MOD 40: CPT | Performed by: NURSE PRACTITIONER

## 2023-07-28 RX ORDER — ONDANSETRON 4 MG/1
4 TABLET, FILM COATED ORAL EVERY 8 HOURS PRN
Qty: 90 TABLET | Refills: 2 | Status: SHIPPED | OUTPATIENT
Start: 2023-07-28 | End: 2023-08-27

## 2023-07-28 NOTE — PROGRESS NOTES
[facility] - Outpatient Consultation  Nely Peres 34 y.o. male MRN: [id number]  Encounter: [id number]          ASSESSMENT AND PLAN:      1. Gastroesophageal reflux disease, unspecified whether esophagitis present  2. Nausea and vomiting, unspecified vomiting type  3. Bloated abdomen  Abdominal bloating, nausea and vomiting, acid reflux and heartburn over the last several year which has increased in severity. Patient reports he will vomit once every 2 weeks. Patient does use smoke medical marijuana daily for anxiety. Differential diagnosis include gastritis, esophagitis, GERD, H. pylori, celiac disease, ulceration, SIBO, cannabis hyperemesis syndrome or lesion.   - Ambulatory Referral to Gastroenterology  - EGD; Scheduled for EGD. Prep and procedure explained to patient in detail. Further recommendations pending results of EGD. -Continue Pantoprazole 40 mg 2 times daily prior to breakfast and dinner.   - ondansetron (ZOFRAN) 4 mg tablet; Take 1 tablet (4 mg total) by mouth every 8 (eight) hours as needed for nausea or vomiting  Dispense: 90 tablet; Refill: 2    Follow-up 3-4 weeks after procedure. Patient reports that he has been experiencing  ______________________________________________________________________    HPI: Nely Peres is a 32-year-old male who presents to GI office as a new consult with upper GI issues. Acid reflux and heartburn which has been going on since 2018 despite being on pantoprazole 40 mg 2 times a day. Patient also reports abdominal bloating, nausea, and vomiting. Patient reports he will vomit once every 2 weeks. Patient reports nausea is daily. Patient denies epigastric or abdominal pain. Patient is on medical marijuana patient denies any blood in stool, blood from rectal area, or black tarry stool. Patient denies upper or lower abdominal pain. Abdomen exam benign. Fwhich he smokes daily for anxiety.   Patient does report history of hemorrhoids in which she gets some occasional rectal discomfort. Patient does not use NSAIDs daily Advil or Excedrin for migraine. Patient is a former tobacco smoker. No family history of gastric or colon cancer. No previous EGD or colonoscopy. Last blood work done in 2021 which showed normal CMP and normal hemoglobin and platelets. REVIEW OF SYSTEMS:    CONSTITUTIONAL: Denies any fever, chills, rigors, and weight loss. HEENT: No earache or tinnitus. Denies hearing loss or visual disturbances. CARDIOVASCULAR: No chest pain or palpitations. RESPIRATORY: Denies any cough, hemoptysis, shortness of breath or dyspnea on exertion. GASTROINTESTINAL: As noted in the History of Present Illness. GENITOURINARY: No problems with urination. Denies any hematuria or dysuria. NEUROLOGIC: No dizziness or vertigo, denies headaches. MUSCULOSKELETAL: Denies any muscle or joint pain. SKIN: Denies skin rashes or itching. ENDOCRINE: Denies excessive thirst. Denies intolerance to heat or cold. PSYCHOSOCIAL: Denies depression or anxiety. Denies any recent memory loss.        Historical Information   Past Medical History:   Diagnosis Date   • Anxiety      Past Surgical History:   Procedure Laterality Date   • SKIN BIOPSY     • WISDOM TOOTH EXTRACTION       Social History   Social History     Substance and Sexual Activity   Alcohol Use Yes    Comment: socially     Social History     Substance and Sexual Activity   Drug Use Yes   • Types: Marijuana     Social History     Tobacco Use   Smoking Status Former   • Passive exposure: Past   Smokeless Tobacco Never     Family History   Problem Relation Age of Onset   • Diabetes Mother    • Diabetes Maternal Grandmother    • Melanoma Maternal Grandfather        Meds/Allergies       Current Outpatient Medications:   •  ALPRAZolam (XANAX) 0.25 mg tablet  •  cetirizine (ZyrTEC) 10 mg tablet  •  ondansetron (ZOFRAN) 4 mg tablet  •  pantoprazole (PROTONIX) 40 mg tablet  • rizatriptan (MAXALT) 10 mg tablet  •  zolpidem (AMBIEN) 10 mg tablet    No Known Allergies        Objective     Blood pressure 124/89, pulse 75, height 5' 9" (1.753 m), weight 64 kg (141 lb). Body mass index is 20.82 kg/m². PHYSICAL EXAM:      General Appearance:   Alert, cooperative, no distress   HEENT:   Normocephalic, atraumatic, anicteric.     Neck:  Supple, symmetrical, trachea midline   Lungs:   Clear to auscultation bilaterally; no rales, rhonchi or wheezing; respirations unlabored    Heart[de-identified]   Regular rate and rhythm; no murmur, rub, or gallop. Abdomen:   Soft, non-tender, non-distended; normal bowel sounds; no masses, no organomegaly    Genitalia:   Deferred    Rectal:   Deferred    Extremities:  No cyanosis, clubbing or edema    Pulses:  2+ and symmetric    Skin:  No jaundice, rashes, or lesions    Lymph nodes:  No palpable cervical lymphadenopathy        Lab Results:   No visits with results within 1 Day(s) from this visit. Latest known visit with results is:   Office Visit on 03/02/2023   Component Date Value   •  RAPID STREP A 03/02/2023 Negative          Radiology Results:   No results found.

## 2023-07-28 NOTE — TELEPHONE ENCOUNTER
Scheduled date of EGD(as of today):8/28/23  Physician performing EGD:Micky  Location of EGD:[facility]  Instructions reviewed with patient by:Leigh Ann MUNOZ  Clearances: None

## 2023-07-28 NOTE — PATIENT INSTRUCTIONS
Follow antireflux diet and measures-may have 1-2 8 ounce cups of coffee daily. Avoid caffeine, chocolate, fried spicy fatty foods, tomato-based products and carbonated beverages. When you eat stay sitting upright for 1 hour afterwards. Do not eat 3 hours before you go to bed. When you sleep at night sleep with your head of bed elevated on multiple pillows.

## 2023-08-01 DIAGNOSIS — G47.00 INSOMNIA, UNSPECIFIED TYPE: ICD-10-CM

## 2023-08-01 RX ORDER — ZOLPIDEM TARTRATE 10 MG/1
10 TABLET ORAL
Qty: 30 TABLET | Refills: 0 | Status: SHIPPED | OUTPATIENT
Start: 2023-08-01

## 2023-08-01 NOTE — TELEPHONE ENCOUNTER
[id number] 06/29/2023 06/29/2023 Zolpidem Tartrate (Tablet) 30.0 30 10 MG NA SHALINI [employer] [facility] 0 / 0 [place]     1 [id number] 06/16/2023 06/16/2023 ALPRAZolam (Tablet) 40.0 20 0.25 MG NA [employer] [facility] [facility] 0 / 0 [place]    1 [id number] 06/01/2023 06/01/2023 Zolpidem Tartrate (Tablet) 30.0 30 10 MG NA [employer] [facility] 0 / 0 [place]    1 [id number] 05/04/2023 05/01/2023 Zolpidem Tartrate (Tablet) 30.0 30 10 MG NA [employer] [facility] 0 / 0 [place]

## 2023-08-17 DIAGNOSIS — F41.9 ANXIETY: ICD-10-CM

## 2023-08-17 DIAGNOSIS — K21.00 GASTROESOPHAGEAL REFLUX DISEASE WITH ESOPHAGITIS: ICD-10-CM

## 2023-08-18 RX ORDER — ALPRAZOLAM 0.25 MG/1
TABLET ORAL
Qty: 40 TABLET | Refills: 0 | Status: SHIPPED | OUTPATIENT
Start: 2023-08-18

## 2023-08-18 RX ORDER — PANTOPRAZOLE SODIUM 40 MG/1
40 TABLET, DELAYED RELEASE ORAL 2 TIMES DAILY
Qty: 60 TABLET | Refills: 3 | Status: SHIPPED | OUTPATIENT
Start: 2023-08-18

## 2023-08-18 NOTE — TELEPHONE ENCOUNTER
[id number] 08/01/2023 08/01/2023 Zolpidem Tartrate (Tablet) 30.0 30 10 MG NA SHALINI BRO[employer]T [employer] 0 / 0 [place]     1 [id number] 06/29/2023 06/29/2023 Zolpidem Tartrate (Tablet) 30.0 30 10 MG NA SHALINI [employer] 0 / 0 [place]    1 [id number] 06/16/2023 06/16/2023 ALPRAZolam (Tablet) 40.0 20 0.25 MG NA [facility] 0 / 0 [place]    1 [id number] 06/01/2023 06/01/2023 Zolpidem Tartrate (Tablet) 30.0 30 10 MG NA ARIES NEVILLE [employer] [facility] 0 / 0 [place]

## 2023-08-21 ENCOUNTER — TELEPHONE (OUTPATIENT)
Dept: GASTROENTEROLOGY | Facility: CLINIC | Age: 28
End: 2023-08-21

## 2023-08-21 NOTE — TELEPHONE ENCOUNTER
Called and confirmed EGD with pt for 8/28. Has his  and prep instructions. Will be called Thursday with time.

## 2023-08-28 ENCOUNTER — ANESTHESIA (OUTPATIENT)
Dept: GASTROENTEROLOGY | Facility: AMBULATORY SURGERY CENTER | Age: 28
End: 2023-08-28

## 2023-08-28 ENCOUNTER — ANESTHESIA EVENT (OUTPATIENT)
Dept: GASTROENTEROLOGY | Facility: AMBULATORY SURGERY CENTER | Age: 28
End: 2023-08-28

## 2023-08-28 ENCOUNTER — HOSPITAL ENCOUNTER (OUTPATIENT)
Dept: GASTROENTEROLOGY | Facility: AMBULATORY SURGERY CENTER | Age: 28
Discharge: HOME | End: 2023-08-28
Payer: COMMERCIAL

## 2023-08-28 VITALS
WEIGHT: 140 LBS | SYSTOLIC BLOOD PRESSURE: 151 MMHG | DIASTOLIC BLOOD PRESSURE: 100 MMHG | OXYGEN SATURATION: 99 % | TEMPERATURE: 97 F | HEIGHT: 69 IN | RESPIRATION RATE: 18 BRPM | HEART RATE: 69 BPM | BODY MASS INDEX: 20.73 KG/M2

## 2023-08-28 DIAGNOSIS — R11.2 NAUSEA AND VOMITING, UNSPECIFIED VOMITING TYPE: ICD-10-CM

## 2023-08-28 DIAGNOSIS — R14.0 BLOATED ABDOMEN: ICD-10-CM

## 2023-08-28 DIAGNOSIS — K21.9 GASTROESOPHAGEAL REFLUX DISEASE, UNSPECIFIED WHETHER ESOPHAGITIS PRESENT: ICD-10-CM

## 2023-08-28 PROCEDURE — 43239 EGD BIOPSY SINGLE/MULTIPLE: CPT | Performed by: INTERNAL MEDICINE

## 2023-08-28 PROCEDURE — 88305 TISSUE EXAM BY PATHOLOGIST: CPT | Performed by: STUDENT IN AN ORGANIZED HEALTH CARE EDUCATION/TRAINING PROGRAM

## 2023-08-28 RX ORDER — SODIUM CHLORIDE, SODIUM LACTATE, POTASSIUM CHLORIDE, CALCIUM CHLORIDE 600; 310; 30; 20 MG/100ML; MG/100ML; MG/100ML; MG/100ML
125 INJECTION, SOLUTION INTRAVENOUS CONTINUOUS
Status: CANCELLED | OUTPATIENT
Start: 2023-08-28

## 2023-08-28 RX ORDER — LIDOCAINE HYDROCHLORIDE 20 MG/ML
INJECTION, SOLUTION EPIDURAL; INFILTRATION; INTRACAUDAL; PERINEURAL AS NEEDED
Status: DISCONTINUED | OUTPATIENT
Start: 2023-08-28 | End: 2023-08-28

## 2023-08-28 RX ORDER — PROPOFOL 10 MG/ML
INJECTION, EMULSION INTRAVENOUS AS NEEDED
Status: DISCONTINUED | OUTPATIENT
Start: 2023-08-28 | End: 2023-08-28

## 2023-08-28 RX ORDER — GLYCOPYRROLATE 0.2 MG/ML
INJECTION INTRAMUSCULAR; INTRAVENOUS AS NEEDED
Status: DISCONTINUED | OUTPATIENT
Start: 2023-08-28 | End: 2023-08-28

## 2023-08-28 RX ORDER — SODIUM CHLORIDE, SODIUM LACTATE, POTASSIUM CHLORIDE, CALCIUM CHLORIDE 600; 310; 30; 20 MG/100ML; MG/100ML; MG/100ML; MG/100ML
125 INJECTION, SOLUTION INTRAVENOUS CONTINUOUS
Status: DISCONTINUED | OUTPATIENT
Start: 2023-08-28 | End: 2023-09-01 | Stop reason: HOSPADM

## 2023-08-28 RX ADMIN — LIDOCAINE HYDROCHLORIDE 100 MG: 20 INJECTION, SOLUTION EPIDURAL; INFILTRATION; INTRACAUDAL; PERINEURAL at 09:02

## 2023-08-28 RX ADMIN — SODIUM CHLORIDE, SODIUM LACTATE, POTASSIUM CHLORIDE, CALCIUM CHLORIDE: 600; 310; 30; 20 INJECTION, SOLUTION INTRAVENOUS at 08:31

## 2023-08-28 RX ADMIN — PROPOFOL 200 MG: 10 INJECTION, EMULSION INTRAVENOUS at 09:02

## 2023-08-28 RX ADMIN — GLYCOPYRROLATE 0.2 MG: 0.2 INJECTION INTRAMUSCULAR; INTRAVENOUS at 09:02

## 2023-08-28 RX ADMIN — PROPOFOL 100 MG: 10 INJECTION, EMULSION INTRAVENOUS at 09:06

## 2023-08-28 NOTE — ANESTHESIA POSTPROCEDURE EVALUATION
Post-Op Assessment Note    CV Status:  Stable  Pain Score: 0    Pain management: adequate     Mental Status:  Awake and sleepy   Hydration Status:  Euvolemic and stable   PONV Controlled:  None   Airway Patency:  Patent      Post Op Vitals Reviewed: Yes      Staff: CRNA         No notable events documented.     BP 95/53 (08/28/23 0911)    Temp     Pulse 80 (08/28/23 0911)   Resp 19 (08/28/23 0911)    SpO2 96 % (08/28/23 0911)

## 2023-08-28 NOTE — ANESTHESIA PREPROCEDURE EVALUATION
Procedure:  EGD    Relevant Problems   ANESTHESIA (within normal limits)      GI/HEPATIC   (+) Gastroesophageal reflux disease with esophagitis      NEURO/PSYCH   (+) Anxiety        Physical Exam    Airway    Mallampati score: II  TM Distance: >3 FB  Neck ROM: full     Dental   No notable dental hx     Cardiovascular      Pulmonary      Other Findings        Anesthesia Plan  ASA Score- 2     Anesthesia Type- IV sedation with anesthesia with ASA Monitors. Additional Monitors:   Airway Plan:           Plan Factors-Exercise tolerance (METS): >4 METS. Chart reviewed. EKG reviewed. Existing labs reviewed. Patient summary reviewed. Patient is a current smoker. Induction- intravenous. Postoperative Plan-     Informed Consent- Anesthetic plan and risks discussed with patient. I personally reviewed this patient with the CRNA. Discussed and agreed on the Anesthesia Plan with the CRNA. Lisbeth Goyal

## 2023-08-28 NOTE — H&P
History and Physical - [facility]  Gordon Salcido 34 y.o. male MRN: [id number]                  HPI: Gordon Salcido is a 34y.o. year old male who presents for upper endoscopy due to history of reflux, nausea vomiting and bloating. REVIEW OF SYSTEMS: Per the HPI, and otherwise unremarkable. Historical Information   Past Medical History:   Diagnosis Date   • Anxiety    • Bloated abdomen    • GERD (gastroesophageal reflux disease)      Past Surgical History:   Procedure Laterality Date   • SKIN BIOPSY     • WISDOM TOOTH EXTRACTION       Social History   Social History     Substance and Sexual Activity   Alcohol Use Yes    Comment: socially     Social History     Substance and Sexual Activity   Drug Use Yes   • Types: Marijuana    Comment: did last night     Social History     Tobacco Use   Smoking Status Former   • Types: Cigarettes   • Passive exposure: Past   Smokeless Tobacco Never     Family History   Problem Relation Age of Onset   • Diabetes Mother    • Diabetes Maternal Grandmother    • Melanoma Maternal Grandfather        Meds/Allergies       Current Outpatient Medications:   •  ALPRAZolam (XANAX) 0.25 mg tablet  •  cetirizine (ZyrTEC) 10 mg tablet  •  ondansetron (ZOFRAN) 4 mg tablet  •  pantoprazole (PROTONIX) 40 mg tablet  •  zolpidem (AMBIEN) 10 mg tablet  •  rizatriptan (MAXALT) 10 mg tablet    Current Facility-Administered Medications:   •  lactated ringers infusion, 125 mL/hr, Intravenous, Continuous    No Known Allergies    Objective     /91   Pulse 61 Comment: gary  Temp (!) 97 °F (36.1 °C) (Temporal)   Resp 18   Ht 5' 9" (1.753 m)   Wt 63.5 kg (140 lb)   SpO2 100%   BMI 20.67 kg/m²       PHYSICAL EXAM    Gen: NAD  Head: NCAT  CV: RRR  CHEST: Clear  ABD: soft, NT/ND  EXT: no edema      ASSESSMENT/PLAN:  This is a 34y.o. year old male here for upper endoscopy, and he is stable and optimized for his procedure.

## 2023-08-31 DIAGNOSIS — G47.00 INSOMNIA, UNSPECIFIED TYPE: ICD-10-CM

## 2023-08-31 PROCEDURE — 88305 TISSUE EXAM BY PATHOLOGIST: CPT | Performed by: STUDENT IN AN ORGANIZED HEALTH CARE EDUCATION/TRAINING PROGRAM

## 2023-08-31 RX ORDER — ZOLPIDEM TARTRATE 10 MG/1
10 TABLET ORAL
Qty: 30 TABLET | Refills: 0 | Status: SHIPPED | OUTPATIENT
Start: 2023-08-31

## 2023-08-31 NOTE — TELEPHONE ENCOUNTER
1 [id number] 08/18/2023 08/18/2023 ALPRAZolam (Tablet) 40.0 20 0.25 MG NA [employer] 0 / 0 [place]    1 [id number] 08/01/2023 08/01/2023 Zolpidem Tartrate (Tablet) 30.0 30 10 MG NA [employer] 0 / 0 [place]    1 [id number] 06/29/2023 06/29/2023 Zolpidem Tartrate (Tablet) 30.0 30 10 MG NA [employer] 0 / 0 PA    1 [id number] 06/16/2023 06/16/2023 ALPRAZolam (Tablet) 40.0 20 0.25 MG NA [employer] 0 / 0 PA    1 [id number] 06/01/2023 06/01/2023 Zolpidem Tartrate (Tablet) 30.0 30 10 MG NA [employer] 0 / 0 [place]    1 [id number] 05/04/2023 05/01/2023 Zolpidem Tartrate (Tablet) 30.0 30 10 MG NA [employer] 0 / 0 [place]    1 [id number] 04/04/2023 03/31/2023 Zolpidem Tartrate (Tablet) 30.0 30 10 MG NA [employer] 0 / 0 PA    1 [id number] 03/06/2023 03/06/2023 Zolpidem Tartrate (Tablet) 30.0 30 10 MG NA [employer] 0 / 0 [place]    1 [id number] 02/06/2023 02/03/2023 Zolpidem Tartrate (Tablet) 30.0 30 10 MG NA [employer] 0 / 0 [place]    1 [id number] 01/09/2023 01/05/2023 Zolpidem Tartrate (Tablet) 30.0 30 10 MG NA [employer] 0 / 0 [place]

## 2023-09-06 ENCOUNTER — OFFICE VISIT (OUTPATIENT)
Dept: GASTROENTEROLOGY | Facility: CLINIC | Age: 28
End: 2023-09-06
Payer: COMMERCIAL

## 2023-09-06 DIAGNOSIS — R11.2 NAUSEA AND VOMITING, UNSPECIFIED VOMITING TYPE: ICD-10-CM

## 2023-09-06 DIAGNOSIS — R11.0 NAUSEA: ICD-10-CM

## 2023-09-06 DIAGNOSIS — R14.0 BLOATING: Primary | ICD-10-CM

## 2023-09-06 PROCEDURE — 91065 BREATH HYDROGEN/METHANE TEST: CPT | Performed by: INTERNAL MEDICINE

## 2023-09-06 NOTE — PROGRESS NOTES
[facility]       Bacterial Overgrowth Analytical Record    Orpha Fan 34 y.o. male MRN: [id number]      Date of Test: 9/1/23    Substrate Given: Lactulose    Ordering Provider: Dr. Claudean Pester Assistant: Fredy Mooer. Symptoms: nausea, bloating and vomiting    The patient presents for bacterial overgrowth testing. Patient fasted overnight. Baseline readings obtained. Breath test performed every 20 min for a total of 3 hr    Sample Clock Time ppmH2 ppmCH4 Co2% Wendy   Baseline 11:56   4 9 4.5 1.22   #1  20 minutes 12:20 7 8 4.0 1.37   #2  40 minutes 12:40 13 10 4.5 1.22   #3  60 minutes 1:00 43 13 3.8 1.44   #4  80 minutes 1:20 71 18 3.7 1.48   #5  100 minutes 1:40 83 17 4.1 1.34     #6  120 minutes 2:00 172 20 4.2 1.30   #7  140 minutes 2:20 152 21 3.6 1.52   #8  160 minutes 2:40 102 18 3.6 1.52   #9  180 minutes 1:00 86 17 3.9 1.41       Physician interpretation: Study positive for SIBO based on hydrogen and methane elevation.   Consider treated with rifaximin and neomycin

## 2023-09-07 ENCOUNTER — TELEPHONE (OUTPATIENT)
Dept: OTHER | Facility: HOSPITAL | Age: 28
End: 2023-09-07

## 2023-09-07 DIAGNOSIS — K58.0 IRRITABLE BOWEL SYNDROME WITH DIARRHEA: Primary | ICD-10-CM

## 2023-09-07 DIAGNOSIS — K63.89 SMALL INTESTINAL BACTERIAL OVERGROWTH (SIBO): ICD-10-CM

## 2023-09-07 RX ORDER — NEOMYCIN SULFATE 500 MG/1
500 TABLET ORAL 2 TIMES DAILY
Qty: 28 TABLET | Refills: 0 | Status: SHIPPED | OUTPATIENT
Start: 2023-09-07 | End: 2023-09-21

## 2023-09-07 NOTE — TELEPHONE ENCOUNTER
----- Message from Leo Sheppard MD sent at 9/6/2023  8:16 PM EDT -----  Hope all is well please see the study positive for ISAIAH.

## 2023-09-11 ENCOUNTER — TELEPHONE (OUTPATIENT)
Dept: GASTROENTEROLOGY | Facility: CLINIC | Age: 28
End: 2023-09-11

## 2023-09-11 NOTE — TELEPHONE ENCOUNTER
Patient's Xifaxan is requiring a prior auth please complete through     CoverMyMeds: [id number]    Thank you
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
## 2023-09-12 NOTE — TELEPHONE ENCOUNTER
[facility]  Key: [id number]   Awaiting determination

## 2023-09-12 NOTE — TELEPHONE ENCOUNTER
Pharmacy Benefits     [address] [place] [address] [id number] ([address])    Covered:  Retail, Mail Order    Unknown:  Specialty, Long-Term Care  Member ID:  [id number]  Group ID:  [id number]  Group name:  INDIVIDUAL ACCOUNTS ON EX     BIN:  [id number]  PCN:  [id number]      [employer]s could not complete PA  Will proceed with CoverMyMeds

## 2023-09-14 NOTE — TELEPHONE ENCOUNTER
rcvd fax  Janet Patel approved until 9/13/2024  Scanned in media  Advised pt via [employer]

## 2023-09-21 DIAGNOSIS — F41.9 ANXIETY: ICD-10-CM

## 2023-09-21 RX ORDER — ALPRAZOLAM 0.25 MG/1
TABLET ORAL
Qty: 40 TABLET | Refills: 0 | Status: SHIPPED | OUTPATIENT
Start: 2023-09-21

## 2023-09-23 ENCOUNTER — APPOINTMENT (EMERGENCY)
Dept: RADIOLOGY | Facility: HOSPITAL | Age: 28
End: 2023-09-23
Payer: COMMERCIAL

## 2023-09-23 ENCOUNTER — HOSPITAL ENCOUNTER (EMERGENCY)
Facility: HOSPITAL | Age: 28
Discharge: HOME | End: 2023-09-23
Attending: EMERGENCY MEDICINE
Payer: COMMERCIAL

## 2023-09-23 VITALS
OXYGEN SATURATION: 99 % | DIASTOLIC BLOOD PRESSURE: 101 MMHG | TEMPERATURE: 97.8 F | HEART RATE: 67 BPM | RESPIRATION RATE: 16 BRPM | SYSTOLIC BLOOD PRESSURE: 134 MMHG

## 2023-09-23 DIAGNOSIS — R03.0 ELEVATED BLOOD PRESSURE READING: ICD-10-CM

## 2023-09-23 DIAGNOSIS — R79.89 LOW TSH LEVEL: ICD-10-CM

## 2023-09-23 DIAGNOSIS — R07.9 CHEST PAIN, UNSPECIFIED TYPE: Primary | ICD-10-CM

## 2023-09-23 LAB
ALBUMIN SERPL BCP-MCNC: 5.1 G/DL (ref 3.5–5)
ALP SERPL-CCNC: 64 U/L (ref 34–104)
ALT SERPL W P-5'-P-CCNC: 14 U/L (ref 7–52)
ANION GAP SERPL CALCULATED.3IONS-SCNC: 8 MMOL/L
AST SERPL W P-5'-P-CCNC: 15 U/L (ref 13–39)
BASOPHILS # BLD AUTO: 0.04 THOUSANDS/ÂΜL (ref 0–0.1)
BASOPHILS NFR BLD AUTO: 1 % (ref 0–1)
BILIRUB SERPL-MCNC: 0.37 MG/DL (ref 0.2–1)
BUN SERPL-MCNC: 6 MG/DL (ref 5–25)
CALCIUM SERPL-MCNC: 9.8 MG/DL (ref 8.4–10.2)
CARDIAC TROPONIN I PNL SERPL HS: <2 NG/L
CHLORIDE SERPL-SCNC: 101 MMOL/L (ref 96–108)
CO2 SERPL-SCNC: 30 MMOL/L (ref 21–32)
CREAT SERPL-MCNC: 0.69 MG/DL (ref 0.6–1.3)
EOSINOPHIL # BLD AUTO: 0.2 THOUSAND/ÂΜL (ref 0–0.61)
EOSINOPHIL NFR BLD AUTO: 3 % (ref 0–6)
ERYTHROCYTE [DISTWIDTH] IN BLOOD BY AUTOMATED COUNT: 12 % (ref 11.6–15.1)
GFR SERPL CREATININE-BSD FRML MDRD: 131 ML/MIN/1.73SQ M
GLUCOSE SERPL-MCNC: 97 MG/DL (ref 65–140)
HCT VFR BLD AUTO: 45.9 % (ref 36.5–49.3)
HGB BLD-MCNC: 15.5 G/DL (ref 12–17)
IMM GRANULOCYTES # BLD AUTO: 0.01 THOUSAND/UL (ref 0–0.2)
IMM GRANULOCYTES NFR BLD AUTO: 0 % (ref 0–2)
LYMPHOCYTES # BLD AUTO: 2.45 THOUSANDS/ÂΜL (ref 0.6–4.47)
LYMPHOCYTES NFR BLD AUTO: 32 % (ref 14–44)
MAGNESIUM SERPL-MCNC: 2.2 MG/DL (ref 1.9–2.7)
MCH RBC QN AUTO: 31.5 PG (ref 26.8–34.3)
MCHC RBC AUTO-ENTMCNC: 33.8 G/DL (ref 31.4–37.4)
MCV RBC AUTO: 93 FL (ref 82–98)
MONOCYTES # BLD AUTO: 0.62 THOUSAND/ÂΜL (ref 0.17–1.22)
MONOCYTES NFR BLD AUTO: 8 % (ref 4–12)
NEUTROPHILS # BLD AUTO: 4.42 THOUSANDS/ÂΜL (ref 1.85–7.62)
NEUTS SEG NFR BLD AUTO: 56 % (ref 43–75)
NRBC BLD AUTO-RTO: 0 /100 WBCS
PLATELET # BLD AUTO: 294 THOUSANDS/UL (ref 149–390)
PMV BLD AUTO: 9.9 FL (ref 8.9–12.7)
POTASSIUM SERPL-SCNC: 3.7 MMOL/L (ref 3.5–5.3)
PROT SERPL-MCNC: 8 G/DL (ref 6.4–8.4)
RBC # BLD AUTO: 4.92 MILLION/UL (ref 3.88–5.62)
SODIUM SERPL-SCNC: 139 MMOL/L (ref 135–147)
TSH SERPL DL<=0.05 MIU/L-ACNC: 0.31 UIU/ML (ref 0.45–4.5)
WBC # BLD AUTO: 7.74 THOUSAND/UL (ref 4.31–10.16)

## 2023-09-23 PROCEDURE — 80053 COMPREHEN METABOLIC PANEL: CPT | Performed by: EMERGENCY MEDICINE

## 2023-09-23 PROCEDURE — 85025 COMPLETE CBC W/AUTO DIFF WBC: CPT | Performed by: EMERGENCY MEDICINE

## 2023-09-23 PROCEDURE — 36415 COLL VENOUS BLD VENIPUNCTURE: CPT | Performed by: EMERGENCY MEDICINE

## 2023-09-23 PROCEDURE — 99285 EMERGENCY DEPT VISIT HI MDM: CPT

## 2023-09-23 PROCEDURE — 84484 ASSAY OF TROPONIN QUANT: CPT | Performed by: EMERGENCY MEDICINE

## 2023-09-23 PROCEDURE — 93005 ELECTROCARDIOGRAM TRACING: CPT

## 2023-09-23 PROCEDURE — 84439 ASSAY OF FREE THYROXINE: CPT | Performed by: EMERGENCY MEDICINE

## 2023-09-23 PROCEDURE — 71045 X-RAY EXAM CHEST 1 VIEW: CPT

## 2023-09-23 PROCEDURE — 99285 EMERGENCY DEPT VISIT HI MDM: CPT | Performed by: EMERGENCY MEDICINE

## 2023-09-23 PROCEDURE — 84443 ASSAY THYROID STIM HORMONE: CPT | Performed by: EMERGENCY MEDICINE

## 2023-09-23 PROCEDURE — 83735 ASSAY OF MAGNESIUM: CPT | Performed by: EMERGENCY MEDICINE

## 2023-09-24 LAB
ATRIAL RATE: 64 BPM
ATRIAL RATE: 65 BPM
ATRIAL RATE: 67 BPM
P AXIS: 31 DEGREES
P AXIS: 37 DEGREES
P AXIS: 39 DEGREES
PR INTERVAL: 112 MS
PR INTERVAL: 126 MS
PR INTERVAL: 132 MS
QRS AXIS: 11 DEGREES
QRS AXIS: 16 DEGREES
QRS AXIS: 22 DEGREES
QRSD INTERVAL: 88 MS
QRSD INTERVAL: 90 MS
QRSD INTERVAL: 92 MS
QT INTERVAL: 374 MS
QT INTERVAL: 378 MS
QT INTERVAL: 388 MS
QTC INTERVAL: 385 MS
QTC INTERVAL: 393 MS
QTC INTERVAL: 409 MS
T WAVE AXIS: 52 DEGREES
T WAVE AXIS: 54 DEGREES
T WAVE AXIS: 60 DEGREES
T4 FREE SERPL-MCNC: 0.86 NG/DL (ref 0.61–1.12)
VENTRICULAR RATE: 64 BPM
VENTRICULAR RATE: 65 BPM
VENTRICULAR RATE: 67 BPM

## 2023-09-24 PROCEDURE — 93010 ELECTROCARDIOGRAM REPORT: CPT | Performed by: INTERNAL MEDICINE

## 2023-09-24 NOTE — ED PROVIDER NOTES
History  Chief Complaint   Patient presents with   • Chest Pain     Pt presents to the ED with generalized chest pain that has been intermittent since Wednesday      34year-old male with history of anxiety, GERD who presents for evaluation of chest pain. Patient reports ongoing symptoms for the past 3 days. He has had intermittent episodes of pain that varies in location. At various times he has pain in his chest, back, left upper arm, and/or jaw. He states that the pain lasts for a few minutes before spontaneously resolving. He will then be symptom-free for a few hours and have return of pain. He has also had intermittent episodes of lightheadedness, nausea, and diaphoresis which seem unrelated to the pain. The symptoms resolved after a few minutes of resting. He has never lost consciousness. He otherwise denies fever, cough, shortness of breath, abdominal pain, vomiting. He notes similar symptoms a few years ago when he was diagnosed with anxiety. He was prescribed Xanax at that time which she did take at the onset of the symptoms but does not feel as though it has helped. Prior to Admission Medications   Prescriptions Last Dose Informant Patient Reported? Taking?    ALPRAZolam (XANAX) 0.25 mg tablet   No No   Si/2-1 tab po bid prn anxiety   cetirizine (ZyrTEC) 10 mg tablet  Self No No   Sig: take 1 tablet by mouth twice a day   ondansetron (ZOFRAN) 4 mg tablet   No No   Sig: Take 1 tablet (4 mg total) by mouth every 8 (eight) hours as needed for nausea or vomiting   pantoprazole (PROTONIX) 40 mg tablet   No No   Sig: Take 1 tablet (40 mg total) by mouth 2 (two) times a day   rizatriptan (MAXALT) 10 mg tablet  Self No No   Sig: Take 1 tablet (10 mg total) by mouth once as needed for migraine for up to 1 dose may repeat in 2 hours if necessary   zolpidem (AMBIEN) 10 mg tablet   No No   Sig: Take 1 tablet (10 mg total) by mouth daily at bedtime as needed for sleep      Facility-Administered Medications: None       Past Medical History:   Diagnosis Date   • Anxiety    • Bloated abdomen    • GERD (gastroesophageal reflux disease)        Past Surgical History:   Procedure Laterality Date   • SKIN BIOPSY     • WISDOM TOOTH EXTRACTION         Family History   Problem Relation Age of Onset   • Diabetes Mother    • Diabetes Maternal Grandmother    • Melanoma Maternal Grandfather      I have reviewed and agree with the history as documented. E-Cigarette/Vaping   • E-Cigarette Use Current Every Day User      E-Cigarette/Vaping Substances   • Nicotine Yes    • THC No    • CBD No    • Flavoring No    • Other No    • Unknown No      Social History     Tobacco Use   • Smoking status: Former     Types: Cigarettes     Passive exposure: Past   • Smokeless tobacco: Never   Vaping Use   • Vaping Use: Every day   • Substances: Nicotine   Substance Use Topics   • Alcohol use: Yes     Comment: socially   • Drug use: Yes     Types: Marijuana     Comment: did last night       Review of Systems   Constitutional: Negative for chills and fever. Respiratory: Negative for cough and shortness of breath. Cardiovascular: Positive for chest pain. Negative for leg swelling. Gastrointestinal: Positive for nausea. Negative for abdominal pain, constipation, diarrhea and vomiting. Genitourinary: Negative for dysuria and flank pain. Musculoskeletal: Positive for back pain. Negative for gait problem. Skin: Positive for rash. Neurological: Positive for light-headedness. Negative for weakness. All other systems reviewed and are negative. Physical Exam  Physical Exam  Vitals and nursing note reviewed. Constitutional:       General: He is not in acute distress. Appearance: He is not ill-appearing. HENT:      Head: Normocephalic and atraumatic.       Nose: Nose normal.      Mouth/Throat:      Mouth: Mucous membranes are moist.   Eyes:      Conjunctiva/sclera: Conjunctivae normal.   Cardiovascular:      Rate and Rhythm: Normal rate and regular rhythm. Heart sounds: No murmur heard. No friction rub. No gallop. Pulmonary:      Effort: Pulmonary effort is normal.      Breath sounds: Normal breath sounds. No wheezing, rhonchi or rales. Abdominal:      General: There is no distension. Palpations: Abdomen is soft. Tenderness: There is no abdominal tenderness. Musculoskeletal:         General: No swelling or tenderness. Normal range of motion. Cervical back: Normal range of motion and neck supple. Skin:     General: Skin is warm and dry. Coloration: Skin is not pale. Findings: No rash. Neurological:      General: No focal deficit present. Mental Status: He is alert and oriented to person, place, and time. Psychiatric:         Behavior: Behavior normal.         Vital Signs  ED Triage Vitals [09/23/23 2025]   Temperature Pulse Respirations Blood Pressure SpO2   97.8 °F (36.6 °C) 67 20 (!) 168/115 100 %      Temp Source Heart Rate Source Patient Position - Orthostatic VS BP Location FiO2 (%)   Oral Monitor Lying Left arm --      Pain Score       --           Vitals:    09/23/23 2025 09/23/23 2130   BP: (!) 168/115 (!) 134/101   Pulse: 67 67   Patient Position - Orthostatic VS: Lying          Visual Acuity      ED Medications  Medications - No data to display    Diagnostic Studies  Results Reviewed     Procedure Component Value Units Date/Time    TSH, 3rd generation with Free T4 reflex [[id number]]  (Abnormal) Collected: 09/23/23 2046    Lab Status: Final result Specimen: Blood from Arm, Right Updated: 09/23/23 2124     TSH 3RD GENERATON 0.313 uIU/mL     T4, free [[id number]] Collected: 09/23/23 2046    Lab Status:  In process Specimen: Blood from Arm, Right Updated: 09/23/23 2124    HS Troponin 0hr (reflex protocol) [[id number]]  (Normal) Collected: 09/23/23 2046    Lab Status: Final result Specimen: Blood from Arm, Right Updated: 09/23/23 2115     hs TnI 0hr <2 ng/L     HS Troponin I 2hr [[id number]]     Lab Status: No result Specimen: Blood     Comprehensive metabolic panel [[id number]]  (Abnormal) Collected: 09/23/23 2046    Lab Status: Final result Specimen: Blood from Arm, Right Updated: 09/23/23 2114     Sodium 139 mmol/L      Potassium 3.7 mmol/L      Chloride 101 mmol/L      CO2 30 mmol/L      ANION GAP 8 mmol/L      BUN 6 mg/dL      Creatinine 0.69 mg/dL      Glucose 97 mg/dL      Calcium 9.8 mg/dL      AST 15 U/L      ALT 14 U/L      Alkaline Phosphatase 64 U/L      Total Protein 8.0 g/dL      Albumin 5.1 g/dL      Total Bilirubin 0.37 mg/dL      eGFR 131 ml/min/1.73sq m     Narrative:      [place] guidelines for Chronic Kidney Disease (CKD):   •  Stage 1 with normal or high GFR (GFR > 90 mL/min/1.73 square meters)  •  Stage 2 Mild CKD (GFR = 60-89 mL/min/1.73 square meters)  •  Stage 3A Moderate CKD (GFR = 45-59 mL/min/1.73 square meters)  •  Stage 3B Moderate CKD (GFR = 30-44 mL/min/1.73 square meters)  •  Stage 4 Severe CKD (GFR = 15-29 mL/min/1.73 square meters)  •  Stage 5 End Stage CKD (GFR <15 mL/min/1.73 square meters)  Note: GFR calculation is accurate only with a steady state creatinine    Magnesium [[id number]]  (Normal) Collected: 09/23/23 2046    Lab Status: Final result Specimen: Blood from Arm, Right Updated: 09/23/23 2114     Magnesium 2.2 mg/dL     CBC and differential [[id number]] Collected: 09/23/23 2046    Lab Status: Final result Specimen: Blood from Arm, Right Updated: 09/23/23 2051     WBC 7.74 Thousand/uL      RBC 4.92 Million/uL      Hemoglobin 15.5 g/dL      Hematocrit 45.9 %      MCV 93 fL      MCH 31.5 pg      MCHC 33.8 g/dL      RDW 12.0 %      MPV 9.9 fL      Platelets 233 Thousands/uL      nRBC 0 /100 WBCs      Neutrophils Relative 56 %      Immat GRANS % 0 %      Lymphocytes Relative 32 %      Monocytes Relative 8 %      Eosinophils Relative 3 %      Basophils Relative 1 %      Neutrophils Absolute 4.42 Thousands/µL      Immature Grans Absolute 0.01 Thousand/uL      Lymphocytes Absolute 2.45 Thousands/µL      Monocytes Absolute 0.62 Thousand/µL      Eosinophils Absolute 0.20 Thousand/µL      Basophils Absolute 0.04 Thousands/µL                  XR chest 1 view portable   ED Interpretation by Anne South MD (09/23 2113)   No acute cardiopulmonary abnormality. Independently interpreted by me. Procedures  Procedures         ED Course  ED Course as of 09/23/23 2232   Sat Sep 23, 2023   2040 Procedure Note: EKG  Date/Time: 09/23/23 8:26 PM   Interpreted by: Taras Tejeda  Indications / Diagnosis: CP  ECG reviewed by me, the ED Provider: yes   The EKG demonstrates:  Rhythm: rate 64, normal sinus with sinus arrhythmia  Intervals: normal intervals  Axis: normal axis  QRS/Blocks: normal QRS  ST Changes: No acute ST Changes, no STD/BRIANNA.    2052 CBC and differential   2113 Procedure Note: EKG  Date/Time: 09/23/23 9:06 PM   Interpreted by: Taras Tejeda  Indications / Diagnosis: CP  ECG reviewed by me, the ED Provider: yes   The EKG demonstrates:  Rhythm: rate 65, normal sinus  Intervals: normal intervals  Axis: normal axis  QRS/Blocks: normal QRS  ST Changes: No acute ST Changes, no STD/BRIANNA.    2114 Magnesium: 2.2   2114 Comprehensive metabolic panel(!)   3943 hs TnI 0hr: <2   2127 TSH 3RD GENERATON(!): 0.313             HEART Risk Score    Flowsheet Row Most Recent Value   Heart Score Risk Calculator    History 0 Filed at: 09/23/2023 2204   ECG 0 Filed at: 09/23/2023 2204   Age 0 Filed at: 09/23/2023 2204   Risk Factors 0 Filed at: 09/23/2023 2204   Troponin 0 Filed at: 09/23/2023 2204   HEART Score 0 Filed at: 09/23/2023 2204                        SBIRT 20yo+    Flowsheet Row Most Recent Value   Initial Alcohol Screen: US AUDIT-C     1. How often do you have a drink containing alcohol? 0 Filed at: 09/23/2023 2038   2. How many drinks containing alcohol do you have on a typical day you are drinking?   0 Filed at: 09/23/2023 2038 3a. Male UNDER 65: How often do you have five or more drinks on one occasion? 0 Filed at: 09/23/2023 2038   Audit-C Score 0 Filed at: 09/23/2023 2038   [place]: How many times in the past year have you. .. Used an illegal drug or used a prescription medication for non-medical reasons? Never Filed at: 09/23/2023 2038                    Medical Decision Making  60-year-old male presenting for evaluation of intermittent chest pain. Differential diagnoses include but not limited to ACS, pneumothorax, pneumonia, electrolyte abnormality, thyroid abnormality, arrhythmia. Labs notable for low TSH but otherwise unremarkable. Troponin within normal limits. EKG unremarkable. Chest x-ray unremarkable. Patient stable for discharge at this time. Advised follow-up primary care physician regarding his low TSH level and elevated blood pressure reading. Return precautions discussed. Chest pain, unspecified type: acute illness or injury  Elevated blood pressure reading: acute illness or injury  Low TSH level: acute illness or injury  Amount and/or Complexity of Data Reviewed  Labs: ordered. Decision-making details documented in ED Course. Radiology: ordered and independent interpretation performed. ECG/medicine tests: ordered and independent interpretation performed. Decision-making details documented in ED Course.           Disposition  Final diagnoses:   Chest pain, unspecified type   Low TSH level   Elevated blood pressure reading     Time reflects when diagnosis was documented in both MDM as applicable and the Disposition within this note     Time User Action Codes Description Comment    9/23/2023 10:04 PM Alejandra Queen Add [R07.9] Chest pain, unspecified type     9/23/2023 10:04 PM Alejandra Queen Add [R79.89] Low TSH level     9/23/2023 10:04 PM Dao Cowan [R03.0] Elevated blood pressure reading       ED Disposition     ED Disposition   Discharge    Condition   Stable    Date/Time   Sat Sep 23, 2023 10:04 PM Comment   Climmie Coral discharge to home/self care. Follow-up Information     Follow up With Specialties Details Why [address], DO Family Medicine In 2 days  [address] [facility]. [place]  [phone]            Discharge Medication List as of 9/23/2023 10:05 PM      CONTINUE these medications which have NOT CHANGED    Details   ALPRAZolam (XANAX) 0.25 mg tablet 1/2-1 tab po bid prn anxiety, Normal      cetirizine (ZyrTEC) 10 mg tablet take 1 tablet by mouth twice a day, Normal      ondansetron (ZOFRAN) 4 mg tablet Take 1 tablet (4 mg total) by mouth every 8 (eight) hours as needed for nausea or vomiting, Starting Fri 7/28/2023, Until Mon 8/28/2023 at 2359, Normal      pantoprazole (PROTONIX) 40 mg tablet Take 1 tablet (40 mg total) by mouth 2 (two) times a day, Starting Fri 8/18/2023, Normal      rizatriptan (MAXALT) 10 mg tablet Take 1 tablet (10 mg total) by mouth once as needed for migraine for up to 1 dose may repeat in 2 hours if necessary, Starting Thu 3/30/2023, Normal      zolpidem (AMBIEN) 10 mg tablet Take 1 tablet (10 mg total) by mouth daily at bedtime as needed for sleep, Starting Thu 8/31/2023, Normal             No discharge procedures on file.     PDMP Review       Value Time User    PDMP Reviewed  Yes 6/16/2023  8:37 AM Tito Bonilla DO          ED Provider  Electronically Signed by           Lima Sifuentes MD  09/23/23 4074

## 2023-09-24 NOTE — ED NOTES
Patient reporting right sided chest pain.  EKG completed and given to MD. Darline Johns, ANDREAS  09/23/23 7826

## 2023-09-27 DIAGNOSIS — G47.00 INSOMNIA, UNSPECIFIED TYPE: ICD-10-CM

## 2023-09-27 RX ORDER — ZOLPIDEM TARTRATE 10 MG/1
10 TABLET ORAL
Qty: 30 TABLET | Refills: 0 | Status: SHIPPED | OUTPATIENT
Start: 2023-09-27

## 2023-09-27 NOTE — TELEPHONE ENCOUNTER
Reason for call:   [x] Refill   [] Prior Auth  [] Other:     Office:   [x] PCP/Provider -Bekc Antoine,     [] Speciality/Provider -     Medication: Zolpidem    Dose/Frequency: 10mg daily at bedtime as needed    Quantity: 30    Pharmacy: [facility]    Does the patient have enough for 3 days?    [x] Yes   [] No - Send as HP to POD

## 2023-09-28 ENCOUNTER — OFFICE VISIT (OUTPATIENT)
Dept: GASTROENTEROLOGY | Facility: CLINIC | Age: 28
End: 2023-09-28
Payer: COMMERCIAL

## 2023-09-28 ENCOUNTER — OFFICE VISIT (OUTPATIENT)
Dept: UROLOGY | Facility: CLINIC | Age: 28
End: 2023-09-28
Payer: COMMERCIAL

## 2023-09-28 VITALS
HEIGHT: 70 IN | DIASTOLIC BLOOD PRESSURE: 86 MMHG | WEIGHT: 138 LBS | SYSTOLIC BLOOD PRESSURE: 130 MMHG | HEART RATE: 58 BPM | BODY MASS INDEX: 19.76 KG/M2

## 2023-09-28 VITALS
WEIGHT: 138.6 LBS | DIASTOLIC BLOOD PRESSURE: 86 MMHG | HEIGHT: 69 IN | OXYGEN SATURATION: 98 % | SYSTOLIC BLOOD PRESSURE: 132 MMHG | BODY MASS INDEX: 20.53 KG/M2 | HEART RATE: 74 BPM

## 2023-09-28 DIAGNOSIS — N50.82 SCROTAL PAIN: ICD-10-CM

## 2023-09-28 DIAGNOSIS — K58.0 IRRITABLE BOWEL SYNDROME WITH DIARRHEA: Primary | ICD-10-CM

## 2023-09-28 DIAGNOSIS — R35.0 URINARY FREQUENCY: Primary | ICD-10-CM

## 2023-09-28 DIAGNOSIS — Z12.11 COLON CANCER SCREENING: ICD-10-CM

## 2023-09-28 DIAGNOSIS — K21.9 GASTROESOPHAGEAL REFLUX DISEASE WITHOUT ESOPHAGITIS: ICD-10-CM

## 2023-09-28 DIAGNOSIS — K63.8219 SMALL INTESTINAL BACTERIAL OVERGROWTH (SIBO): ICD-10-CM

## 2023-09-28 LAB
POST-VOID RESIDUAL VOLUME, ML POC: 7 ML
SL AMB  POCT GLUCOSE, UA: NORMAL
SL AMB LEUKOCYTE ESTERASE,UA: NORMAL
SL AMB POCT BILIRUBIN,UA: NORMAL
SL AMB POCT BLOOD,UA: NORMAL
SL AMB POCT CLARITY,UA: CLEAR
SL AMB POCT COLOR,UA: YELLOW
SL AMB POCT KETONES,UA: NORMAL
SL AMB POCT NITRITE,UA: NORMAL
SL AMB POCT PH,UA: 5
SL AMB POCT SPECIFIC GRAVITY,UA: 1.02
SL AMB POCT URINE PROTEIN: NORMAL
SL AMB POCT UROBILINOGEN: NORMAL

## 2023-09-28 PROCEDURE — 99214 OFFICE O/P EST MOD 30 MIN: CPT | Performed by: NURSE PRACTITIONER

## 2023-09-28 PROCEDURE — 51798 US URINE CAPACITY MEASURE: CPT | Performed by: PHYSICIAN ASSISTANT

## 2023-09-28 PROCEDURE — 99204 OFFICE O/P NEW MOD 45 MIN: CPT | Performed by: PHYSICIAN ASSISTANT

## 2023-09-28 PROCEDURE — 81002 URINALYSIS NONAUTO W/O SCOPE: CPT | Performed by: PHYSICIAN ASSISTANT

## 2023-09-28 RX ORDER — OXYBUTYNIN CHLORIDE 10 MG/1
10 TABLET, EXTENDED RELEASE ORAL
Qty: 90 TABLET | Refills: 3 | Status: SHIPPED | OUTPATIENT
Start: 2023-09-28 | End: 2023-10-02 | Stop reason: CLARIF

## 2023-09-28 NOTE — PROGRESS NOTES
Parminder Villalpando [facility] - Outpatient Follow-up Note  Vinicius Belle 34 y.o. male MRN: [id number]  Encounter: [id number]          ASSESSMENT AND PLAN:      1. Irritable bowel syndrome with diarrhea  Patient reports abdomen bloating associated with intermitted episodes of diarrhea that occur 2 times weekly and has 3 occurances of loose stool on those days. Symptoms due to IBS-diarrhea  - Patient is prescribed Xifaxan for SIBO which will also treat his IBS-diarrhea.  -High fiber diet    2. Small intestinal bacterial overgrowth (SIBO)  SIBO study positive for SIBO based on hydrogen and methane elevation. Continues with abdominal bloating.  -Insurance approval  Xifaxan.    -Patient pharmacy [facility] closed and patient  transferred his RX to [facility] and waiting for a prescription to be filled. -Patient already obtained neomycin previously ordered.  -Neomycin 500 mg daily and rifaximin 550 mg every 8 hours concurrently.  -Website for Xifaxan co-pay savings card given to patient. 3. Gastroesophageal reflux disease without esophagitis  Patient has history of GERD. Patient currently denies acid reflux, heartburn, nausea, vomiting, epigastric or abdominal pain. GERD symptoms are controlled on current medication.  -Continue antireflux diet and measures  -Continue pantoprazole 40 mg twice daily    4. Colon cancer screening  Due at age 41    Follow up 3 months    ______________________________________________________________________    SUBJECTIVE:  Vinicius Belle is a 34year old male who presents to office for follow-up. Results of EGD and biopsy reviewed with patient. Patient reports that symptoms of GERD are currently well controlled on medication. Patient denies nausea, vomiting, acid reflux, heartburn, epigastric or abdominal pain. Patient reports rare episodes of blood when he wipes from his hemorrhoids. Patient otherwise denies any hemorrhoidal discomfort or pain.   Patient denies blood in stool, blood in toilet or black tarry stool. Abdomen exam benign no abdominal tenderness or guarding. Patient continues to report abdominal bloating. Patient also reports fluctuating bowel habits. Patient will experience diarrhea approximately 2 times a week with 3 episodes of loose stool each time. SIBO study positive for SIBO based on hydrogen and methane elevation. Patient reports that his serrated recently closed and he had his prescription sent over to [facility] he is waiting to see if they accept his insurance. Xifaxan was recently approved. Patient is concerned about cost of Xifaxan. Lab work done 9/23/2023 shows CBC within normal limits and CMP within normal limits except slightly elevated albumin level 5.1. Patient is a former tobacco smoker. No family history of gastric or colon cancer. Does smoke marijuana for anxiety. EGD done 8/28/2023 showed mild antritis. Gastroesophageal reflux. Biopsies benign. No intestinal metaplasia. No H. pylori. GE junction showed mild nonspecific gastritis. REVIEW OF SYSTEMS IS OTHERWISE NEGATIVE.       Historical Information   Past Medical History:   Diagnosis Date   • Anxiety    • Bloated abdomen    • GERD (gastroesophageal reflux disease)      Past Surgical History:   Procedure Laterality Date   • SKIN BIOPSY     • UPPER GASTROINTESTINAL ENDOSCOPY     • WISDOM TOOTH EXTRACTION       Social History   Social History     Substance and Sexual Activity   Alcohol Use Yes    Comment: socially     Social History     Substance and Sexual Activity   Drug Use Yes   • Types: Marijuana    Comment: did last night     Social History     Tobacco Use   Smoking Status Former   • Types: Cigarettes   • Passive exposure: Past   Smokeless Tobacco Never     Family History   Problem Relation Age of Onset   • Diabetes Mother    • Diabetes Maternal Grandmother    • Melanoma Maternal Grandfather        Meds/Allergies       Current Outpatient Medications:   •  ALPRAZolam (XANAX) 0.25 mg tablet  • cetirizine (ZyrTEC) 10 mg tablet  •  ondansetron (ZOFRAN) 4 mg tablet  •  pantoprazole (PROTONIX) 40 mg tablet  •  rizatriptan (MAXALT) 10 mg tablet  •  zolpidem (AMBIEN) 10 mg tablet  •  oxybutynin (DITROPAN-XL) 10 MG 24 hr tablet    No Known Allergies        Objective     Blood pressure 130/86, pulse 58, height 5' 10" (1.778 m), weight 62.6 kg (138 lb). Body mass index is 19.8 kg/m². PHYSICAL EXAM:      General Appearance:   Alert, cooperative, no distress   HEENT:   Normocephalic, atraumatic, anicteric.     Neck:  Supple, symmetrical, trachea midline   Lungs:   Clear to auscultation bilaterally; no rales, rhonchi or wheezing; respirations unlabored    Heart[de-identified]   Regular rate and rhythm; no murmur, rub, or gallop. Abdomen:   Soft, non-tender, non-distended; normal bowel sounds; no masses, no organomegaly    Genitalia:   Deferred    Rectal:   Deferred    Extremities:  No cyanosis, clubbing or edema    Pulses:  2+ and symmetric    Skin:  No jaundice, rashes, or lesions    Lymph nodes:  No palpable cervical lymphadenopathy        Lab Results:   Office Visit on 09/28/2023   Component Date Value   • LEUKOCYTE ESTERASE,UA 09/28/2023 -    • Tura Leaf 09/28/2023 -    • SL AMB POCT UROBILINOGEN 09/28/2023 -    • POCT URINE PROTEIN 09/28/2023 +    •  PH,UA 09/28/2023 5.0    • BLOOD,UA 09/28/2023 -    • SPECIFIC GRAVITY,UA 09/28/2023 1.025    • KETONES,UA 09/28/2023 trace    • BILIRUBIN,UA 09/28/2023 -    • GLUCOSE, UA 09/28/2023 -    •  COLOR,UA 09/28/2023 yellow    • CLARITY,UA 09/28/2023 clear    • POST-VOID RESIDUAL VOLUM* 09/28/2023 7          Radiology Results:   XR chest 1 view portable    Result Date: 9/24/2023  Narrative: CHEST INDICATION:   chest pain. COMPARISON: 10/10/2021. EXAM PERFORMED/VIEWS:  XR CHEST PORTABLE Images:  1 FINDINGS: Overlying EKG wire leads. Cardiomediastinal silhouette appears unremarkable. The lungs are clear. No pneumothorax or pleural effusion. No acute osseous abnormality. Impression: No acute cardiopulmonary disease.  Workstation performed: [id number]

## 2023-09-28 NOTE — PROGRESS NOTES
9/28/2023      Chief Complaint   Patient presents with   • New Patient Visit     Urinary frequency  Lump on testicle         Assessment and Plan    34 y.o. male -- New patient    1. Urinary frequency  - UA today negative for nitrites, leukocytes, blood  - PVR today 7 mL  - Discussed conservative measures with adequate hydration, avoidance of bladder irritants, avoidance of constipation  - Previously tried Detrol without benefit  - Trial of oxybutynin 10 mg XL. Medication and side effects reviewed  - Follow up in 8-12 weeks for PVR check    2. Right testicular discomfort, intermittent  - Exam benign  - Discussed scrotal support, elevation, ibuprofen, warm soaks  - US scrotum and testicles ordered    3. Constipation  - Increase water intake  - Fiber, probiotics  - Sees GI today  - Call with any questions or concerns in the meantime  - All questions answered; patient understands and agrees with plan       History of Present Illness  Mliss Libman is a 34 y.o. male new patient here for evaluation of urinary frequency. Denies seeing urology in the past. Previously tried Detrol 4 mg XR with PCP without much benefit. Drinks 40 oz of water daily. Patient does have constipation. Does eat Spicy foods. Denies dysuria, gross hematuria, fever, chills, nausea, vomiting. States he does occasionally have right testicular discomfort. No lump. Denies family history of  malignancies. Review of Systems   Constitutional: Negative for activity change, appetite change, chills and fever. HENT: Negative for congestion and trouble swallowing. Respiratory: Negative for cough and shortness of breath. Cardiovascular: Negative for chest pain, palpitations and leg swelling. Gastrointestinal: Negative for abdominal pain, constipation, diarrhea, nausea and vomiting. Genitourinary: Positive for frequency. Negative for difficulty urinating, dysuria, flank pain, hematuria and urgency.    Musculoskeletal: Negative for back pain and gait problem. Skin: Negative for wound. Allergic/Immunologic: Negative for immunocompromised state. Neurological: Negative for dizziness and syncope. Hematological: Does not bruise/bleed easily. Psychiatric/Behavioral: Negative for confusion. All other systems reviewed and are negative. AUA SYMPTOM SCORE    Flowsheet Row Most Recent Value   AUA SYMPTOM SCORE    How often have you had a sensation of not emptying your bladder completely after you finished urinating? 3 (P)     How often have you had to urinate again less than two hours after you finished urinating? 5 (P)     How often have you found you stopped and started again several times when you urinate? 3 (P)     How often have you found it difficult to postpone urination? 4 (P)     How often have you had a weak urinary stream? 2 (P)     How often have you had to push or strain to begin urination? 5 (P)     How many times did you most typically get up to urinate from the time you went to bed at night until the time you got up in the morning? 3 (P)     Quality of Life: If you were to spend the rest of your life with your urinary condition just the way it is now, how would you feel about that? 5 (P)     AUA SYMPTOM SCORE 25 (P)            Vitals  Vitals:    09/28/23 0738   BP: 132/86   Pulse: 74   SpO2: 98%   Weight: 62.9 kg (138 lb 9.6 oz)   Height: 5' 9" (1.753 m)       Physical Exam  Constitutional:       General: He is not in acute distress. Appearance: Normal appearance. He is not ill-appearing, toxic-appearing or diaphoretic. HENT:      Head: Normocephalic. Nose: No congestion. Eyes:      General: No scleral icterus. Right eye: No discharge. Left eye: No discharge. Conjunctiva/sclera: Conjunctivae normal.      Pupils: Pupils are equal, round, and reactive to light. Pulmonary:      Effort: Pulmonary effort is normal.   Musculoskeletal:      Cervical back: Normal range of motion.    Skin:     General: Skin is warm and dry. Coloration: Skin is not jaundiced or pale. Findings: No bruising, erythema, lesion or rash. Neurological:      General: No focal deficit present. Mental Status: He is alert and oriented to person, place, and time. Mental status is at baseline. Gait: Gait normal.   Psychiatric:         Mood and Affect: Mood normal.         Behavior: Behavior normal.         Thought Content:  Thought content normal.         Judgment: Judgment normal.           Past History  Past Medical History:   Diagnosis Date   • Anxiety    • Bloated abdomen    • GERD (gastroesophageal reflux disease)      Social History     Socioeconomic History   • Marital status: Single     Spouse name: None   • Number of children: None   • Years of education: None   • Highest education level: None   Occupational History   • None   Tobacco Use   • Smoking status: Former     Types: Cigarettes     Passive exposure: Past   • Smokeless tobacco: Never   Vaping Use   • Vaping Use: Every day   • Substances: Nicotine   Substance and Sexual Activity   • Alcohol use: Yes     Comment: socially   • Drug use: Yes     Types: Marijuana     Comment: did last night   • Sexual activity: Yes     Partners: Female   Other Topics Concern   • None   Social History Narrative   • None     Social Determinants of Health     Financial Resource Strain: Not on file   Food Insecurity: Not on file   Transportation Needs: Not on file   Physical Activity: Not on file   Stress: Not on file   Social Connections: Not on file   Intimate Partner Violence: Not on file   Housing Stability: Not on file     Social History     Tobacco Use   Smoking Status Former   • Types: Cigarettes   • Passive exposure: Past   Smokeless Tobacco Never     Family History   Problem Relation Age of Onset   • Diabetes Mother    • Diabetes Maternal Grandmother    • Melanoma Maternal Grandfather        The following portions of the patient's history were reviewed and updated as appropriate: allergies, current medications, past medical history, past social history, past surgical history and problem list.    Results  Recent Results (from the past 1 hour(s))   POCT Measure PVR    Collection Time: 09/28/23  7:40 AM   Result Value Ref Range    POST-VOID RESIDUAL VOLUME, ML POC 7 mL   POCT urine dip    Collection Time: 09/28/23  7:43 AM   Result Value Ref Range    LEUKOCYTE ESTERASE,UA -     NITRITE,UA -     SL AMB POCT UROBILINOGEN -     POCT URINE PROTEIN +      PH,UA 5.0     BLOOD,UA -     SPECIFIC GRAVITY,UA 1.025     KETONES,UA trace     BILIRUBIN,UA -     GLUCOSE, UA -      COLOR,UA yellow     CLARITY,UA clear    ]  No results found for: "PSA"  Lab Results   Component Value Date    CALCIUM 9.8 09/23/2023    K 3.7 09/23/2023    CO2 30 09/23/2023     09/23/2023    BUN 6 09/23/2023    CREATININE 0.69 09/23/2023     Lab Results   Component Value Date    WBC 7.74 09/23/2023    HGB 15.5 09/23/2023    HCT 45.9 09/23/2023    MCV 93 09/23/2023     09/23/2023       Savanna Garcia PA-C

## 2023-10-02 DIAGNOSIS — R35.0 URINARY FREQUENCY: Primary | ICD-10-CM

## 2023-10-02 RX ORDER — OXYBUTYNIN CHLORIDE 5 MG/1
5 TABLET ORAL 2 TIMES DAILY
Qty: 60 TABLET | Refills: 2 | Status: SHIPPED | OUTPATIENT
Start: 2023-10-02 | End: 2023-11-02

## 2023-10-11 ENCOUNTER — TELEMEDICINE (OUTPATIENT)
Dept: FAMILY MEDICINE CLINIC | Facility: CLINIC | Age: 28
End: 2023-10-11
Payer: COMMERCIAL

## 2023-10-11 VITALS — BODY MASS INDEX: 19.47 KG/M2 | HEIGHT: 70 IN | WEIGHT: 136 LBS | TEMPERATURE: 98.6 F

## 2023-10-11 DIAGNOSIS — F41.9 ANXIETY: Primary | ICD-10-CM

## 2023-10-11 PROCEDURE — 99213 OFFICE O/P EST LOW 20 MIN: CPT | Performed by: FAMILY MEDICINE

## 2023-10-12 ENCOUNTER — TELEPHONE (OUTPATIENT)
Age: 28
End: 2023-10-12

## 2023-10-12 ENCOUNTER — CLINICAL SUPPORT (OUTPATIENT)
Dept: FAMILY MEDICINE CLINIC | Facility: CLINIC | Age: 28
End: 2023-10-12

## 2023-10-12 DIAGNOSIS — F41.9 ANXIETY: ICD-10-CM

## 2023-10-12 DIAGNOSIS — F41.9 ANXIETY: Primary | ICD-10-CM

## 2023-10-12 NOTE — PROGRESS NOTES
Virtual Regular Visit    Verification of patient location:    Patient is located at Home in the following state in which I hold an active license [place]      Assessment/Plan:    Problem List Items Addressed This Visit          Other    Anxiety - Primary            Reason for visit is   Chief Complaint   Patient presents with    Anxiety    Depression        Encounter provider Milla Porter DO    Provider located at [address] [place] [phone]      Recent Visits  Date Type Provider Dept   10/11/23 [address], [address] [facility] recent visits within past 7 days and meeting all other requirements  Future Appointments  No visits were found meeting these conditions. Showing future appointments within next 150 days and meeting all other requirements       The patient was identified by name and date of birth. Ede Villasenor was informed that this is a telemedicine visit and that the visit is being conducted through the [employer] He agrees to proceed. .  My office door was closed. No one else was in the room. He acknowledged consent and understanding of privacy and security of the video platform. The patient has agreed to participate and understands they can discontinue the visit at any time. Patient is aware this is a billable service. Subjective  Ede Villasenor is a 34 y.o. male who is currently not on any medication to control anxiety other than as needed Xanax. During the last 2 to 3 weeks he has had to take the Xanax on a daily basis and does not want to become addicted. He is also concerned about what agent we pick to we discussed GeneSight testing control his anxiety, because he had a lot of difficulty weaning off of the Lexapro.   We discussed GeneSight testing and he is concerned about her dialysis      HPI     Past Medical History:   Diagnosis Date    Anxiety     Bloated abdomen     GERD (gastroesophageal reflux disease)        Past Surgical History:   Procedure Laterality Date    SKIN BIOPSY      UPPER GASTROINTESTINAL ENDOSCOPY      WISDOM TOOTH EXTRACTION         Current Outpatient Medications   Medication Sig Dispense Refill    ALPRAZolam (XANAX) 0.25 mg tablet 1/2-1 tab po bid prn anxiety 40 tablet 0    cetirizine (ZyrTEC) 10 mg tablet take 1 tablet by mouth twice a day (Patient taking differently: Take 10 mg by mouth if needed) 180 tablet 0    pantoprazole (PROTONIX) 40 mg tablet Take 1 tablet (40 mg total) by mouth 2 (two) times a day 60 tablet 3    rizatriptan (MAXALT) 10 mg tablet Take 1 tablet (10 mg total) by mouth once as needed for migraine for up to 1 dose may repeat in 2 hours if necessary 10 tablet 0    zolpidem (AMBIEN) 10 mg tablet Take 1 tablet (10 mg total) by mouth daily at bedtime as needed for sleep 30 tablet 0    oxybutynin (DITROPAN) 5 mg tablet Take 1 tablet (5 mg total) by mouth 2 (two) times a day (Patient not taking: Reported on 10/11/2023) 60 tablet 2     No current facility-administered medications for this visit. No Known Allergies    Review of Systems   Constitutional:  Negative for fatigue and unexpected weight change. Cardiovascular:  Negative for chest pain, palpitations and leg swelling. Psychiatric/Behavioral:  Positive for decreased concentration and sleep disturbance. Negative for suicidal ideas. The patient is nervous/anxious. + mood swings       Video Exam    Vitals:    10/11/23 1538   Temp: 98.6 °F (37 °C)   Weight: 61.7 kg (136 lb)   Height: 5' 10" (1.778 m)       Physical Exam  Constitutional:       Appearance: Normal appearance. He is not ill-appearing. Eyes:      General:         Right eye: No discharge. Left eye: No discharge. Pulmonary:      Effort: No respiratory distress. Skin:     Findings: No rash.    Psychiatric:         Mood and Affect: Mood normal.         Behavior: Behavior normal.         Thought Content: Thought content normal.         Judgment: Judgment normal.          Visit Time  Total Visit Duration: 15

## 2023-10-12 NOTE — TELEPHONE ENCOUNTER
Patient called back regarding medication request Xanax. Patient stated the medication was not at the pharmacy. Please advise.

## 2023-10-12 NOTE — TELEPHONE ENCOUNTER
1 [id number] 09/27/2023 09/27/2023 Zolpidem Tartrate (Tablet) 30.0 30 10 MG NA SHALINI [address] [facility] [employer] 0 / 0 [place]    1 [id number] 09/21/2023 09/21/2023 ALPRAZolam (Tablet) 40.0 20 0.25 MG NA SHALINI [employer]  [employer] 0 / 0 [place]    1 [id number] 08/31/2023 08/31/2023 Zolpidem Tartrate (Tablet) 30.0 30 10 MG NA [employer] 0 / 0 [place]    1 [id number] 08/18/2023 08/18/2023 ALPRAZolam (Tablet) 40.0 20 0.25 MG NA [employer] 0 / 0 [place]    1 [id number] 08/01/2023 08/01/2023 Zolpidem Tartrate (Tablet) 30.0 30 10 MG NA [employer] 0 / 0 [place]    1 [id number] 06/29/2023 06/29/2023 Zolpidem Tartrate (Tablet) 30.0 30 10 MG [employer] 0 / 0 [place]    1 [id number] 06/16/2023 06/16/2023 ALPRAZolam (Tablet) 40.0 20 0.25 MG NA [employer] 0 / 0 [place]    1 [id number] 06/01/2023 06/01/2023 Zolpidem Tartrate (Tablet) 30.0 30 10 MG NA [employer] 0 / 0 [place]    1 [id number] 05/04/2023 05/01/2023 Zolpidem Tartrate (Tablet) 30.0 30 10 MG NA [employer] 0 / 0 [place]    1 [id number] 04/04/2023 03/31/2023 Zolpidem Tartrate (Tablet) 30.0 30 10 MG NA [employer] 0 / 0 [place]

## 2023-10-12 NOTE — TELEPHONE ENCOUNTER
Pt called in regards to this medication. Suppose to be a higher mg and is suppose to be a new script. Doctor was suppose to sent to pharmacy. Pt stated that it is not in pharmacy. Please be advised. Reason for call:   [x] Refill   [] Prior Auth  [] Other:     Office:   [x] PCP/Provider -   [] Speciality/Provider -     Medication: xanax    Dose/Frequency: 0.25 mg/ 2 tabs daily PRN     Quantity: 40 tablets     Pharmacy: [facility] pharmacy     Does the patient have enough for 3 days?    [] Yes   [x] No - Send as HP to POD

## 2023-10-13 RX ORDER — ALPRAZOLAM 0.25 MG/1
TABLET ORAL
Qty: 40 TABLET | Refills: 0 | Status: SHIPPED | OUTPATIENT
Start: 2023-10-13

## 2023-10-13 NOTE — TELEPHONE ENCOUNTER
It was just sent today, the pharmacy is not yet open. Advised patient to check with them to confirm when they open.  He will call back if any issues

## 2023-10-23 DIAGNOSIS — G47.00 INSOMNIA, UNSPECIFIED TYPE: ICD-10-CM

## 2023-10-23 RX ORDER — ZOLPIDEM TARTRATE 10 MG/1
10 TABLET ORAL
Qty: 30 TABLET | Refills: 0 | Status: SHIPPED | OUTPATIENT
Start: 2023-10-23

## 2023-10-23 NOTE — TELEPHONE ENCOUNTER
1 [id number] 10/13/2023 10/13/2023 ALPRAZolam (Tablet) 40.0 20 0.25 MG NA SHALINI BROADT [facility] [employer] 0 / 0 [place]    1 [id number] 09/27/2023 09/27/2023 Zolpidem Tartrate (Tablet) 30.0 30 10 MG NA SHALINI [employer] [facility], .XIOMARA [employer] 0 / 0 [place]    1 [id number] 09/21/2023 09/21/2023 ALPRAZolam (Tablet) 40.0 20 0.25 MG NA SHALINI [employer] [employer]  [employer] 0 / 0 [place]    1 [id number] 08/31/2023 08/31/2023 Zolpidem Tartrate (Tablet) 30.0 30 10 MG NA [employer] 0 / 0 [place]    1 [id number] 08/18/2023 08/18/2023 ALPRAZolam (Tablet) 40.0 20 0.25 MG NA [employer] 0 / 0 [place]    1 [id number] 08/01/2023 08/01/2023 Zolpidem Tartrate (Tablet) 30.0 30 10 MG NA [employer] 0 / 0 [place]    1 [id number] 06/29/2023 06/29/2023 Zolpidem Tartrate (Tablet) 30.0 30 10 MG [employer] 0 / 0 [place]    1 [id number] 06/16/2023 06/16/2023 ALPRAZolam (Tablet) 40.0 20 0.25 MG [employer] 0 / 0 [place]    1 [id number] 06/01/2023 06/01/2023 Zolpidem Tartrate (Tablet) 30.0 30 10 MG NA [employer] 0 / 0 [place]    1 [id number] 05/04/2023 05/01/2023 Zolpidem Tartrate (Tablet) 30.0 30 10 MG NA SHALINI [employer] 0 / 0

## 2023-11-02 DIAGNOSIS — R35.0 URINARY FREQUENCY: ICD-10-CM

## 2023-11-02 DIAGNOSIS — F41.9 ANXIETY: ICD-10-CM

## 2023-11-02 RX ORDER — OXYBUTYNIN CHLORIDE 5 MG/1
5 TABLET ORAL 2 TIMES DAILY
Qty: 180 TABLET | Refills: 1 | Status: SHIPPED | OUTPATIENT
Start: 2023-11-02

## 2023-11-02 RX ORDER — ALPRAZOLAM 0.25 MG/1
TABLET ORAL
Qty: 40 TABLET | Refills: 0 | Status: SHIPPED | OUTPATIENT
Start: 2023-11-02

## 2023-11-02 NOTE — TELEPHONE ENCOUNTER
1 [id number] 10/25/2023 10/23/2023 Zolpidem Tartrate (Tablet) 30.0 30 10 MG NA ARIES NEVILLE [facility] [employer] 0 / 0 [place]    1 [id number] 10/13/2023 10/13/2023 ALPRAZolam (Tablet) 40.0 20 0.25 MG NA SHALINI [facility]OADT [facility] [employer] 0 / 0 [place]    1 [id number] 09/27/2023 09/27/2023 Zolpidem Tartrate (Tablet) 30.0 30 10 MG NA SHALINI [employer] [facility] [employer] 0 / 0 [place]    1 [id number] 09/21/2023 09/21/2023 ALPRAZolam (Tablet) 40.0 20 0.25 MG NA SHALINI [employer] 0 / 0 [place]    1 [id number] 08/31/2023 08/31/2023 Zolpidem Tartrate (Tablet) 30.0 30 10 MG NA [employer] [facility] [place]    1 [id number] 08/18/2023 08/18/2023 ALPRAZolam (Tablet) 40.0 20 0.25 MG NA [employer] 0 / 0 [place]    1 [id number] 08/01/2023 08/01/2023 Zolpidem Tartrate (Tablet) 30.0 30 10 MG [employer] 0 / 0 [place]    1 [id number] 06/29/2023 06/29/2023 Zolpidem Tartrate (Tablet) 30.0 30 10 MG NA [employer] [facility] 0 / 0 [place]    1 [id number] 06/16/2023 06/16/2023 ALPRAZolam (Tablet) 40.0 20 0.25 MG NA [employer] 0 / 0 [place]    1 [id number] 06/01/2023 06/01/2023 Zolpidem Tartrate (Tablet) 30.0 30 10 MG NA ARIES KELIN ECKERD [employer] [facility] 0 / 0 [place]

## 2023-11-07 ENCOUNTER — TELEMEDICINE (OUTPATIENT)
Dept: FAMILY MEDICINE CLINIC | Facility: CLINIC | Age: 28
End: 2023-11-07
Payer: COMMERCIAL

## 2023-11-07 DIAGNOSIS — F41.9 ANXIETY: Primary | ICD-10-CM

## 2023-11-07 PROCEDURE — 99213 OFFICE O/P EST LOW 20 MIN: CPT | Performed by: FAMILY MEDICINE

## 2023-11-07 RX ORDER — DESVENLAFAXINE SUCCINATE 50 MG/1
50 TABLET, EXTENDED RELEASE ORAL DAILY
Qty: 30 TABLET | Refills: 3 | Status: SHIPPED | OUTPATIENT
Start: 2023-11-07

## 2023-11-09 NOTE — PROGRESS NOTES
Virtual Regular Visit    Verification of patient location:    Patient is located at Home in the following state in which I hold an active license [place]      Assessment/Plan:    Problem List Items Addressed This Visit          Other    Anxiety - Primary    Relevant Medications    desvenlafaxine succinate (PRISTIQ) 50 mg 24 hr tablet            Reason for visit is No chief complaint on file. Encounter provider Ck Hagen DO    Provider located at [address] [place] [address] [address]  [place] [phone]      Recent Visits  No visits were found meeting these conditions. Showing recent visits within past 7 days and meeting all other requirements  Future Appointments  No visits were found meeting these conditions. Showing future appointments within next 150 days and meeting all other requirements       The patient was identified by name and date of birth. Anoop Olson was informed that this is a telemedicine visit and that the visit is being conducted through the [email or web address]. He agrees to proceed. .  My office door was closed. No one else was in the room. He acknowledged consent and understanding of privacy and security of the video platform. The patient has agreed to participate and understands they can discontinue the visit at any time. Patient is aware this is a billable service. Subjective  Anoop Olson is a 34 y.o. male who recently had a GeneSight test GeneSight testing revealed    Medications were branches with his genetic make-up. These, Pristiq was an option. Because the patient deals mainly with anxiety and panic attacks they have decided on this medication SNRI because of the concern of norepinephrine worsening anxiety symptoms. He did not do well on  Lexapro, even with increases in dosing.         HPI     Past Medical History:   Diagnosis Date    Anxiety     Bloated abdomen     GERD (gastroesophageal reflux disease)        Past Surgical History:   Procedure Laterality Date    SKIN BIOPSY      UPPER GASTROINTESTINAL ENDOSCOPY      WISDOM TOOTH EXTRACTION         Current Outpatient Medications   Medication Sig Dispense Refill    desvenlafaxine succinate (PRISTIQ) 50 mg 24 hr tablet Take 1 tablet (50 mg total) by mouth daily 30 tablet 3    ALPRAZolam (XANAX) 0.25 mg tablet 1/2-1 tab po bid prn anxiety 40 tablet 0    cetirizine (ZyrTEC) 10 mg tablet take 1 tablet by mouth twice a day (Patient taking differently: Take 10 mg by mouth if needed) 180 tablet 0    oxybutynin (DITROPAN) 5 mg tablet TAKE 1 TABLET BY MOUTH TWICE A  tablet 1    pantoprazole (PROTONIX) 40 mg tablet Take 1 tablet (40 mg total) by mouth 2 (two) times a day 60 tablet 3    rizatriptan (MAXALT) 10 mg tablet Take 1 tablet (10 mg total) by mouth once as needed for migraine for up to 1 dose may repeat in 2 hours if necessary 10 tablet 0    zolpidem (AMBIEN) 10 mg tablet Take 1 tablet (10 mg total) by mouth daily at bedtime as needed for sleep 30 tablet 0     No current facility-administered medications for this visit. No Known Allergies    Review of Systems   Constitutional:  Negative for fatigue. Respiratory:  Negative for shortness of breath. Cardiovascular:  Negative for chest pain and palpitations. Neurological:  Negative for headaches. Psychiatric/Behavioral:  The patient is nervous/anxious. Video Exam    There were no vitals filed for this visit. Physical Exam  Vitals reviewed. Constitutional:       General: He is not in acute distress. Appearance: Normal appearance. Eyes:      General:         Right eye: No discharge. Left eye: No discharge. Pulmonary:      Effort: No respiratory distress. Skin:     Findings: No rash. Neurological:      Mental Status: He is alert. Psychiatric:         Mood and Affect: Mood normal.         Behavior: Behavior normal.         Thought Content:  Thought content normal.         Judgment: Judgment normal.          Visit Time  Total Visit Duration: 15

## 2023-11-14 ENCOUNTER — APPOINTMENT (EMERGENCY)
Dept: RADIOLOGY | Facility: HOSPITAL | Age: 28
End: 2023-11-14
Payer: COMMERCIAL

## 2023-11-14 ENCOUNTER — HOSPITAL ENCOUNTER (EMERGENCY)
Facility: HOSPITAL | Age: 28
Discharge: HOME | End: 2023-11-14
Attending: EMERGENCY MEDICINE | Admitting: EMERGENCY MEDICINE
Payer: COMMERCIAL

## 2023-11-14 ENCOUNTER — OFFICE VISIT (OUTPATIENT)
Dept: URGENT CARE | Facility: CLINIC | Age: 28
End: 2023-11-14
Payer: COMMERCIAL

## 2023-11-14 VITALS
TEMPERATURE: 98 F | OXYGEN SATURATION: 98 % | WEIGHT: 125 LBS | DIASTOLIC BLOOD PRESSURE: 101 MMHG | HEART RATE: 66 BPM | SYSTOLIC BLOOD PRESSURE: 149 MMHG | RESPIRATION RATE: 16 BRPM | HEIGHT: 70 IN | BODY MASS INDEX: 17.9 KG/M2

## 2023-11-14 VITALS
DIASTOLIC BLOOD PRESSURE: 100 MMHG | SYSTOLIC BLOOD PRESSURE: 138 MMHG | OXYGEN SATURATION: 97 % | HEART RATE: 63 BPM | RESPIRATION RATE: 18 BRPM | TEMPERATURE: 98.1 F

## 2023-11-14 DIAGNOSIS — F41.9 ANXIETY: Primary | ICD-10-CM

## 2023-11-14 DIAGNOSIS — K21.9 GERD (GASTROESOPHAGEAL REFLUX DISEASE): ICD-10-CM

## 2023-11-14 DIAGNOSIS — R11.0 NAUSEA: ICD-10-CM

## 2023-11-14 DIAGNOSIS — R42 LIGHTHEADEDNESS: ICD-10-CM

## 2023-11-14 DIAGNOSIS — R07.89 CHEST PRESSURE: ICD-10-CM

## 2023-11-14 DIAGNOSIS — R51.9 ACUTE NONINTRACTABLE HEADACHE, UNSPECIFIED HEADACHE TYPE: ICD-10-CM

## 2023-11-14 DIAGNOSIS — R00.2 PALPITATIONS: Primary | ICD-10-CM

## 2023-11-14 DIAGNOSIS — R06.02 SHORTNESS OF BREATH: ICD-10-CM

## 2023-11-14 DIAGNOSIS — R19.8 SYMPTOMS OF GASTROESOPHAGEAL REFLUX: ICD-10-CM

## 2023-11-14 DIAGNOSIS — R03.0 ELEVATED BLOOD PRESSURE READING: ICD-10-CM

## 2023-11-14 LAB
ALBUMIN SERPL BCP-MCNC: 4.8 G/DL (ref 3.5–5)
ALP SERPL-CCNC: 57 U/L (ref 34–104)
ALT SERPL W P-5'-P-CCNC: 14 U/L (ref 7–52)
ANION GAP SERPL CALCULATED.3IONS-SCNC: 9 MMOL/L
AST SERPL W P-5'-P-CCNC: 14 U/L (ref 13–39)
ATRIAL RATE: 68 BPM
ATRIAL RATE: 69 BPM
ATRIAL RATE: 73 BPM
BASOPHILS # BLD AUTO: 0.04 THOUSANDS/ÂΜL (ref 0–0.1)
BASOPHILS NFR BLD AUTO: 0 % (ref 0–1)
BILIRUB SERPL-MCNC: 0.46 MG/DL (ref 0.2–1)
BUN SERPL-MCNC: 7 MG/DL (ref 5–25)
CALCIUM SERPL-MCNC: 9.7 MG/DL (ref 8.4–10.2)
CARDIAC TROPONIN I PNL SERPL HS: <2 NG/L
CHLORIDE SERPL-SCNC: 102 MMOL/L (ref 96–108)
CO2 SERPL-SCNC: 28 MMOL/L (ref 21–32)
CREAT SERPL-MCNC: 0.68 MG/DL (ref 0.6–1.3)
EOSINOPHIL # BLD AUTO: 0.23 THOUSAND/ÂΜL (ref 0–0.61)
EOSINOPHIL NFR BLD AUTO: 2 % (ref 0–6)
ERYTHROCYTE [DISTWIDTH] IN BLOOD BY AUTOMATED COUNT: 11.9 % (ref 11.6–15.1)
GFR SERPL CREATININE-BSD FRML MDRD: 131 ML/MIN/1.73SQ M
GLUCOSE SERPL-MCNC: 92 MG/DL (ref 65–140)
HCT VFR BLD AUTO: 47 % (ref 36.5–49.3)
HGB BLD-MCNC: 15.7 G/DL (ref 12–17)
IMM GRANULOCYTES # BLD AUTO: 0.02 THOUSAND/UL (ref 0–0.2)
IMM GRANULOCYTES NFR BLD AUTO: 0 % (ref 0–2)
LIPASE SERPL-CCNC: 31 U/L (ref 11–82)
LYMPHOCYTES # BLD AUTO: 2.48 THOUSANDS/ÂΜL (ref 0.6–4.47)
LYMPHOCYTES NFR BLD AUTO: 26 % (ref 14–44)
MCH RBC QN AUTO: 30.9 PG (ref 26.8–34.3)
MCHC RBC AUTO-ENTMCNC: 33.4 G/DL (ref 31.4–37.4)
MCV RBC AUTO: 93 FL (ref 82–98)
MONOCYTES # BLD AUTO: 0.87 THOUSAND/ÂΜL (ref 0.17–1.22)
MONOCYTES NFR BLD AUTO: 9 % (ref 4–12)
NEUTROPHILS # BLD AUTO: 5.85 THOUSANDS/ÂΜL (ref 1.85–7.62)
NEUTS SEG NFR BLD AUTO: 63 % (ref 43–75)
NRBC BLD AUTO-RTO: 0 /100 WBCS
P AXIS: 22 DEGREES
P AXIS: 42 DEGREES
P AXIS: 50 DEGREES
PLATELET # BLD AUTO: 305 THOUSANDS/UL (ref 149–390)
PMV BLD AUTO: 9.4 FL (ref 8.9–12.7)
POTASSIUM SERPL-SCNC: 3.9 MMOL/L (ref 3.5–5.3)
PR INTERVAL: 110 MS
PR INTERVAL: 116 MS
PR INTERVAL: 122 MS
PROT SERPL-MCNC: 7.9 G/DL (ref 6.4–8.4)
QRS AXIS: -12 DEGREES
QRS AXIS: 13 DEGREES
QRS AXIS: 19 DEGREES
QRSD INTERVAL: 88 MS
QRSD INTERVAL: 94 MS
QRSD INTERVAL: 96 MS
QT INTERVAL: 364 MS
QT INTERVAL: 370 MS
QT INTERVAL: 370 MS
QTC INTERVAL: 393 MS
QTC INTERVAL: 396 MS
QTC INTERVAL: 401 MS
RBC # BLD AUTO: 5.08 MILLION/UL (ref 3.88–5.62)
SODIUM SERPL-SCNC: 139 MMOL/L (ref 135–147)
T WAVE AXIS: 53 DEGREES
T WAVE AXIS: 56 DEGREES
T WAVE AXIS: 58 DEGREES
VENTRICULAR RATE: 68 BPM
VENTRICULAR RATE: 69 BPM
VENTRICULAR RATE: 73 BPM
WBC # BLD AUTO: 9.49 THOUSAND/UL (ref 4.31–10.16)

## 2023-11-14 PROCEDURE — 93005 ELECTROCARDIOGRAM TRACING: CPT | Performed by: PHYSICIAN ASSISTANT

## 2023-11-14 PROCEDURE — 80053 COMPREHEN METABOLIC PANEL: CPT | Performed by: EMERGENCY MEDICINE

## 2023-11-14 PROCEDURE — 96374 THER/PROPH/DIAG INJ IV PUSH: CPT

## 2023-11-14 PROCEDURE — S9083 URGENT CARE CENTER GLOBAL: HCPCS | Performed by: PHYSICIAN ASSISTANT

## 2023-11-14 PROCEDURE — 93010 ELECTROCARDIOGRAM REPORT: CPT | Performed by: PHYSICIAN ASSISTANT

## 2023-11-14 PROCEDURE — 99284 EMERGENCY DEPT VISIT MOD MDM: CPT

## 2023-11-14 PROCEDURE — 83690 ASSAY OF LIPASE: CPT | Performed by: EMERGENCY MEDICINE

## 2023-11-14 PROCEDURE — G0382 LEV 3 HOSP TYPE B ED VISIT: HCPCS | Performed by: PHYSICIAN ASSISTANT

## 2023-11-14 PROCEDURE — 99285 EMERGENCY DEPT VISIT HI MDM: CPT | Performed by: EMERGENCY MEDICINE

## 2023-11-14 PROCEDURE — 84484 ASSAY OF TROPONIN QUANT: CPT | Performed by: EMERGENCY MEDICINE

## 2023-11-14 PROCEDURE — 36415 COLL VENOUS BLD VENIPUNCTURE: CPT | Performed by: EMERGENCY MEDICINE

## 2023-11-14 PROCEDURE — 93005 ELECTROCARDIOGRAM TRACING: CPT

## 2023-11-14 PROCEDURE — 71046 X-RAY EXAM CHEST 2 VIEWS: CPT

## 2023-11-14 PROCEDURE — 85025 COMPLETE CBC W/AUTO DIFF WBC: CPT | Performed by: EMERGENCY MEDICINE

## 2023-11-14 RX ORDER — SUCRALFATE 1 G/1
1 TABLET ORAL 4 TIMES DAILY
Qty: 60 TABLET | Refills: 0 | Status: SHIPPED | OUTPATIENT
Start: 2023-11-14

## 2023-11-14 RX ORDER — ONDANSETRON 2 MG/ML
4 INJECTION INTRAMUSCULAR; INTRAVENOUS ONCE
Status: COMPLETED | OUTPATIENT
Start: 2023-11-14 | End: 2023-11-14

## 2023-11-14 RX ORDER — MAGNESIUM HYDROXIDE/ALUMINUM HYDROXICE/SIMETHICONE 120; 1200; 1200 MG/30ML; MG/30ML; MG/30ML
30 SUSPENSION ORAL ONCE
Status: COMPLETED | OUTPATIENT
Start: 2023-11-14 | End: 2023-11-14

## 2023-11-14 RX ORDER — FAMOTIDINE 20 MG/1
20 TABLET, FILM COATED ORAL ONCE
Status: COMPLETED | OUTPATIENT
Start: 2023-11-14 | End: 2023-11-14

## 2023-11-14 RX ORDER — ONDANSETRON 4 MG/1
4 TABLET, ORALLY DISINTEGRATING ORAL EVERY 6 HOURS PRN
Qty: 20 TABLET | Refills: 0 | Status: SHIPPED | OUTPATIENT
Start: 2023-11-14

## 2023-11-14 RX ORDER — LORAZEPAM 1 MG/1
1 TABLET ORAL ONCE
Status: COMPLETED | OUTPATIENT
Start: 2023-11-14 | End: 2023-11-14

## 2023-11-14 RX ADMIN — LORAZEPAM 1 MG: 1 TABLET ORAL at 20:47

## 2023-11-14 RX ADMIN — ONDANSETRON 4 MG: 2 INJECTION INTRAMUSCULAR; INTRAVENOUS at 19:41

## 2023-11-14 RX ADMIN — FAMOTIDINE 20 MG: 20 TABLET, FILM COATED ORAL at 19:42

## 2023-11-14 RX ADMIN — ALUMINUM HYDROXIDE, MAGNESIUM HYDROXIDE, AND SIMETHICONE 30 ML: 200; 200; 20 SUSPENSION ORAL at 19:42

## 2023-11-14 NOTE — Clinical Note
Melissa Smyth was seen and treated in our emergency department on 11/14/2023. No restrictions    Other - See Comments    N/A    Diagnosis: GERD, anxiety    Jasis Staley  may return to work on return date, is off the rest of the shift today. He may return on this date: 11/16/2023         If you have any questions or concerns, please don't hesitate to call.       Avril Tamez MD    ______________________________           _______________          _______________  [facility] Representative                              Date                                Time

## 2023-11-14 NOTE — Clinical Note
Johnathan Herrera was seen and treated in our emergency department on 11/14/2023. No restrictions    Other - See Comments    N/A    Diagnosis: GERD, anxiety    Marci Huang  may return to work on return date, is off the rest of the shift today. He may return on this date: 11/16/2023         If you have any questions or concerns, please don't hesitate to call.       Augie Sanchez MD    ______________________________           _______________          _______________  [facility] Representative                              Date                                Time

## 2023-11-14 NOTE — PROGRESS NOTES
[facility]        NAME: Agapito Posadas is a 34 y.o. male  : 1995    MRN: [id number]  DATE: 2023  TIME: 6:19 PM    Assessment and Plan   Palpitations [R00.2]  1. Palpitations  Transfer to other facility    ECG 12 lead      2. Lightheadedness  Transfer to other facility      3. Chest pressure  Transfer to other facility      4. Shortness of breath  Transfer to other facility      5. Acute nonintractable headache, unspecified headache type  Transfer to other facility      6. Elevated blood pressure reading  Transfer to other facility        Dizziness/lightheadedness, palpitations, shortness of breath, chest pressure worsening over the past 2 to 3 days. Nausea, intermittent tingling. States history of anxiety and GERD/acid reflux. States he has been taking his medications with no improvement. EKG- no obvious ST elevations or depressions    Patient Instructions     Patient declined ambulance, he would like to go via private vehicle/have his mom take him to the [facility] Emergency Department for further evaluation, workup and treatment- hospital address verified with the patient. Patient agreed to go immediately to the ED. Chief Complaint     Chief Complaint   Patient presents with    Dizziness     X 3 days light headiness, dizziness          History of Present Illness       32-year-old male presents for evaluation of worsening dizziness that he describes as lightheadedness x3 days. He denies any syncopal episodes but feels like he was going to pass out a few times. States he has also been having some palpitations and chest pressure in the center of his chest, occasional sharp pains. Notes associated nausea and generalized fatigue/tiredness. States the past few days has been checking his blood pressure and has been having elevated blood pressure readings as high as 150s over 110s. Denies any history of high blood pressure.   States he is also been having some headaches. Denies any vision changes. Notes some occasional tingling in bilateral upper arms but denies any numbness or weakness. States he does have a history of acid reflux as well as anxiety. States it feels different than any previous anxiety attacks or acid reflux. States he has tried his medications with no improvement. He denies any history of heart problems or blood clots. Denies any fevers, cough or cold-like symptoms. Review of Systems   Review of Systems   Constitutional:  Positive for fatigue. Negative for activity change, appetite change, chills and fever. HENT:  Negative for congestion, ear pain, rhinorrhea, sore throat and trouble swallowing. Eyes:  Negative for itching and visual disturbance. Respiratory:  Positive for shortness of breath. Negative for cough, chest tightness and wheezing. Cardiovascular:  Positive for chest pain and palpitations. Negative for leg swelling. Gastrointestinal:  Positive for nausea. Negative for abdominal pain, diarrhea and vomiting. Genitourinary:  Negative for decreased urine volume. Musculoskeletal:  Negative for back pain, joint swelling, neck pain and neck stiffness. Skin:  Negative for rash. Neurological:  Positive for dizziness, light-headedness and headaches. Negative for seizures, syncope, weakness and numbness. All other systems reviewed and are negative.         Current Medications       Current Outpatient Medications:     ALPRAZolam (XANAX) 0.25 mg tablet, 1/2-1 tab po bid prn anxiety, Disp: 40 tablet, Rfl: 0    pantoprazole (PROTONIX) 40 mg tablet, Take 1 tablet (40 mg total) by mouth 2 (two) times a day, Disp: 60 tablet, Rfl: 3    zolpidem (AMBIEN) 10 mg tablet, Take 1 tablet (10 mg total) by mouth daily at bedtime as needed for sleep, Disp: 30 tablet, Rfl: 0    cetirizine (ZyrTEC) 10 mg tablet, take 1 tablet by mouth twice a day (Patient not taking: Reported on 11/14/2023), Disp: 180 tablet, Rfl: 0    desvenlafaxine succinate (PRISTIQ) 50 mg 24 hr tablet, Take 1 tablet (50 mg total) by mouth daily (Patient not taking: Reported on 11/14/2023), Disp: 30 tablet, Rfl: 3    oxybutynin (DITROPAN) 5 mg tablet, TAKE 1 TABLET BY MOUTH TWICE A DAY (Patient not taking: Reported on 11/14/2023), Disp: 180 tablet, Rfl: 1    rizatriptan (MAXALT) 10 mg tablet, Take 1 tablet (10 mg total) by mouth once as needed for migraine for up to 1 dose may repeat in 2 hours if necessary (Patient not taking: Reported on 11/14/2023), Disp: 10 tablet, Rfl: 0    Current Allergies     Allergies as of 11/14/2023    (No Known Allergies)            The following portions of the patient's history were reviewed and updated as appropriate: allergies, current medications, past family history, past medical history, past social history, past surgical history and problem list.     Past Medical History:   Diagnosis Date    Anxiety     Bloated abdomen     GERD (gastroesophageal reflux disease)        Past Surgical History:   Procedure Laterality Date    SKIN BIOPSY      UPPER GASTROINTESTINAL ENDOSCOPY      WISDOM TOOTH EXTRACTION         Family History   Problem Relation Age of Onset    Diabetes Mother     Diabetes Maternal Grandmother     Melanoma Maternal Grandfather          Medications have been verified. Objective   /100   Pulse 63   Temp 98.1 °F (36.7 °C)   Resp 18   SpO2 97%        Physical Exam     Physical Exam  Vitals and nursing note reviewed. Constitutional:       General: He is not in acute distress. Appearance: Normal appearance. He is well-developed. He is not toxic-appearing. HENT:      Mouth/Throat:      Mouth: Mucous membranes are moist.      Pharynx: Uvula midline. No uvula swelling. Eyes:      Extraocular Movements: Extraocular movements intact. Pupils: Pupils are equal, round, and reactive to light. Comments: No nystagmus, no pain with EOMs   Cardiovascular:      Rate and Rhythm: Normal rate and regular rhythm. Heart sounds: Normal heart sounds. Pulmonary:      Effort: Pulmonary effort is normal. No respiratory distress. Breath sounds: Normal breath sounds. No wheezing. Chest:      Chest wall: No tenderness. Abdominal:      General: Bowel sounds are normal.      Palpations: Abdomen is soft. Tenderness: There is no abdominal tenderness. There is no guarding. Musculoskeletal:      Cervical back: Normal range of motion and neck supple. No rigidity. Skin:     Capillary Refill: Capillary refill takes less than 2 seconds. Neurological:      General: No focal deficit present. Mental Status: He is alert and oriented to person, place, and time.    Psychiatric:         Behavior: Behavior normal.

## 2023-11-14 NOTE — PATIENT INSTRUCTIONS
Patient declined ambulance, he would like to go via private vehicle/have his mom take him to the [facility] Emergency Department for further evaluation, workup and treatment- hospital address verified with the patient. Patient agreed to go immediately to the ED.

## 2023-11-15 NOTE — ED PROVIDER NOTES
History  Chief Complaint   Patient presents with    Dizziness     Reports dizziness x3 days "like I'm going to pass out" also reports chest tightness, indigestion, increased anxiety. , nausea, decreased appetite. 33 y/o male with hx of anxiety and GERD presents to the ED for evaluation of intermittent lightheadedness, chest tightness, nausea, anxiety, and feelings of indigestion over the last 3 days. The patient states that his symptoms seem slightly improved today but he was concerned because of how long the symptoms have been present. He is experienced similar symptoms in the past due to both anxiety and his GERD, however he has not had them at the same time or for as long as his current episode. He describes his current symptoms as substernal chest tightness and burning with associated nausea. He also reports feeling "amp up" which he attributes to his anxiety. No other symptoms or complaints. Prior to Admission Medications   Prescriptions Last Dose Informant Patient Reported? Taking?    ALPRAZolam (XANAX) 0.25 mg tablet   No No   Si/2-1 tab po bid prn anxiety   cetirizine (ZyrTEC) 10 mg tablet  Self No No   Sig: take 1 tablet by mouth twice a day   Patient not taking: Reported on 2023   desvenlafaxine succinate (PRISTIQ) 50 mg 24 hr tablet   No No   Sig: Take 1 tablet (50 mg total) by mouth daily   Patient not taking: Reported on 2023   oxybutynin (DITROPAN) 5 mg tablet   No No   Sig: TAKE 1 TABLET BY MOUTH TWICE A DAY   Patient not taking: Reported on 2023   pantoprazole (PROTONIX) 40 mg tablet  Self No No   Sig: Take 1 tablet (40 mg total) by mouth 2 (two) times a day   rizatriptan (MAXALT) 10 mg tablet  Self No No   Sig: Take 1 tablet (10 mg total) by mouth once as needed for migraine for up to 1 dose may repeat in 2 hours if necessary   Patient not taking: Reported on 2023   zolpidem (AMBIEN) 10 mg tablet   No No   Sig: Take 1 tablet (10 mg total) by mouth daily at bedtime as needed for sleep      Facility-Administered Medications: None       Past Medical History:   Diagnosis Date    Anxiety     Bloated abdomen     GERD (gastroesophageal reflux disease)        Past Surgical History:   Procedure Laterality Date    SKIN BIOPSY      UPPER GASTROINTESTINAL ENDOSCOPY      WISDOM TOOTH EXTRACTION         Family History   Problem Relation Age of Onset    Diabetes Mother     Diabetes Maternal Grandmother     Melanoma Maternal Grandfather      I have reviewed and agree with the history as documented. E-Cigarette/Vaping    E-Cigarette Use Current Every Day User      E-Cigarette/Vaping Substances    Nicotine Yes     THC No     CBD No     Flavoring No     Other No     Unknown No      Social History     Tobacco Use    Smoking status: Former     Types: Cigarettes     Passive exposure: Past    Smokeless tobacco: Never   Vaping Use    Vaping Use: Every day    Substances: Nicotine   Substance Use Topics    Alcohol use: Yes     Comment: socially    Drug use: Yes     Types: Marijuana     Comment: did last night       Review of Systems   Constitutional:  Negative for chills and fever. HENT:  Negative for congestion, rhinorrhea and sore throat. Respiratory:  Positive for chest tightness. Negative for cough and shortness of breath. Cardiovascular:  Negative for chest pain and palpitations. Gastrointestinal:  Positive for abdominal pain and nausea. Negative for diarrhea and vomiting. Genitourinary:  Negative for dysuria and hematuria. Musculoskeletal:  Negative for back pain and neck pain. Neurological:  Positive for light-headedness. Negative for weakness, numbness and headaches. Psychiatric/Behavioral:  Negative for self-injury and suicidal ideas. The patient is nervous/anxious. All other systems reviewed and are negative. Physical Exam  Physical Exam  Vitals and nursing note reviewed. Constitutional:       General: He is not in acute distress.      Appearance: Normal appearance. He is normal weight. He is not ill-appearing, toxic-appearing or diaphoretic. HENT:      Head: Normocephalic and atraumatic. Right Ear: External ear normal.      Left Ear: External ear normal.      Nose: Nose normal.      Mouth/Throat:      Mouth: Mucous membranes are moist.      Pharynx: Oropharynx is clear. No oropharyngeal exudate or posterior oropharyngeal erythema. Eyes:      Extraocular Movements: Extraocular movements intact. Conjunctiva/sclera: Conjunctivae normal.      Pupils: Pupils are equal, round, and reactive to light. Cardiovascular:      Rate and Rhythm: Normal rate and regular rhythm. Pulses: Normal pulses. Heart sounds: Normal heart sounds. Pulmonary:      Effort: Pulmonary effort is normal. No respiratory distress. Breath sounds: Normal breath sounds. No wheezing or rales. Abdominal:      General: Abdomen is flat. Bowel sounds are normal. There is no distension. Palpations: Abdomen is soft. Tenderness: There is no abdominal tenderness. There is no right CVA tenderness, left CVA tenderness or guarding. Musculoskeletal:         General: No swelling or tenderness. Normal range of motion. Cervical back: Normal range of motion and neck supple. No tenderness. Skin:     General: Skin is warm and dry. Neurological:      General: No focal deficit present. Mental Status: He is alert and oriented to person, place, and time.          Vital Signs  ED Triage Vitals   Temperature Pulse Respirations Blood Pressure SpO2   11/14/23 1858 11/14/23 1855 11/14/23 1855 11/14/23 1855 11/14/23 1855   98 °F (36.7 °C) 66 16 (!) 149/101 98 %      Temp Source Heart Rate Source Patient Position - Orthostatic VS BP Location FiO2 (%)   11/14/23 1858 11/14/23 1855 11/14/23 1855 11/14/23 1855 --   Oral Monitor Sitting Right arm       Pain Score       11/14/23 1855       No Pain           Vitals:    11/14/23 1855   BP: (!) 149/101   Pulse: 66   Patient Position - Orthostatic VS: Sitting         Visual Acuity      ED Medications  Medications   ondansetron (ZOFRAN) injection 4 mg (4 mg Intravenous Given 11/14/23 1941)   famotidine (PEPCID) tablet 20 mg (20 mg Oral Given 11/14/23 1942)   aluminum-magnesium hydroxide-simethicone (MAALOX) oral suspension 30 mL (30 mL Oral Given 11/14/23 1942)   LORazepam (ATIVAN) tablet 1 mg (1 mg Oral Given 11/14/23 2047)       Diagnostic Studies  Results Reviewed       Procedure Component Value Units Date/Time    HS Troponin 0hr (reflex protocol) [[id number]]  (Normal) Collected: 11/14/23 1941    Lab Status: Final result Specimen: Blood from Arm, Left Updated: 11/14/23 2009     hs TnI 0hr <2 ng/L     Comprehensive metabolic panel [[id number]] Collected: 11/14/23 1941    Lab Status: Final result Specimen: Blood from Arm, Left Updated: 11/14/23 2001     Sodium 139 mmol/L      Potassium 3.9 mmol/L      Chloride 102 mmol/L      CO2 28 mmol/L      ANION GAP 9 mmol/L      BUN 7 mg/dL      Creatinine 0.68 mg/dL      Glucose 92 mg/dL      Calcium 9.7 mg/dL      AST 14 U/L      ALT 14 U/L      Alkaline Phosphatase 57 U/L      Total Protein 7.9 g/dL      Albumin 4.8 g/dL      Total Bilirubin 0.46 mg/dL      eGFR 131 ml/min/1.73sq m     Narrative:      [facility] guidelines for Chronic Kidney Disease (CKD):     Stage 1 with normal or high GFR (GFR > 90 mL/min/1.73 square meters)    Stage 2 Mild CKD (GFR = 60-89 mL/min/1.73 square meters)    Stage 3A Moderate CKD (GFR = 45-59 mL/min/1.73 square meters)    Stage 3B Moderate CKD (GFR = 30-44 mL/min/1.73 square meters)    Stage 4 Severe CKD (GFR = 15-29 mL/min/1.73 square meters)    Stage 5 End Stage CKD (GFR <15 mL/min/1.73 square meters)  Note: GFR calculation is accurate only with a steady state creatinine    Lipase [[id number]]  (Normal) Collected: 11/14/23 1941    Lab Status: Final result Specimen: Blood from Arm, Left Updated: 11/14/23 2001     Lipase 31 u/L     CBC and differential [[id number]] Collected: 11/14/23 1941    Lab Status: Final result Specimen: Blood from Arm, Left Updated: 11/14/23 1947     WBC 9.49 Thousand/uL      RBC 5.08 Million/uL      Hemoglobin 15.7 g/dL      Hematocrit 47.0 %      MCV 93 fL      MCH 30.9 pg      MCHC 33.4 g/dL      RDW 11.9 %      MPV 9.4 fL      Platelets 579 Thousands/uL      nRBC 0 /100 WBCs      Neutrophils Relative 63 %      Immat GRANS % 0 %      Lymphocytes Relative 26 %      Monocytes Relative 9 %      Eosinophils Relative 2 %      Basophils Relative 0 %      Neutrophils Absolute 5.85 Thousands/µL      Immature Grans Absolute 0.02 Thousand/uL      Lymphocytes Absolute 2.48 Thousands/µL      Monocytes Absolute 0.87 Thousand/µL      Eosinophils Absolute 0.23 Thousand/µL      Basophils Absolute 0.04 Thousands/µL                    XR chest 2 views   ED Interpretation by Anastasiya Trejo MD (11/14 2011)   No acute cardiopulmonary disease process on my interpretation. Procedures  Procedures         ED Course  ED Course as of 11/14/23 2111 Tue Nov 14, 2023 1929 Procedure Note: EKG  Date/Time: 11/14/23 6:59 PM   Interpreted by: Anastasiya Trejo MD  Indications / Diagnosis: CP, lightheadedness  ECG reviewed by me, the ED Physician: yes   The EKG demonstrates:  Rhythm: normal sinus rhythm 70 bpm with sinus arrhythmia  Intervals: normal intervals  Axis: normal axis  QRS/Blocks: normal QRS  ST Changes: No acute ST-T wave changes. No STEMI. Normal ECG. 1956 WBC: 9.49   1956 Hemoglobin: 15.7   1956 Platelet Count: 783   2010 Comprehensive metabolic panel  All WNL   2010 hs TnI 0hr: <2  Normal   2010 Lipase: 31  Normal   2011 XR chest 2 views  No acute cardiopulmonary disease process on my interpretation.              HEART Risk Score      Flowsheet Row Most Recent Value   Heart Score Risk Calculator    History 0 Filed at: 11/14/2023 2110   ECG 0 Filed at: 11/14/2023 2110   Age 0 Filed at: 11/14/2023 2110   Risk Factors 0 Filed at: 11/14/2023 2110   Troponin 0 Filed at: 11/14/2023 2110   HEART Score 0 Filed at: 11/14/2023 2110                          SBIRT 22yo+      Flowsheet Row Most Recent Value   Initial Alcohol Screen: US AUDIT-C     1. How often do you have a drink containing alcohol? 0 Filed at: 11/14/2023 1857   Audit-C Score 0 Filed at: 11/14/2023 1857   SOCORRO: How many times in the past year have you. .. Used an illegal drug or used a prescription medication for non-medical reasons? Never Filed at: 11/14/2023 [address] Making  35 y/o male with hx of anxiety and GERD presents to the ED for evaluation of intermittent lightheadedness, chest tightness, nausea, anxiety, and feelings of indigestion over the last 3 days. The patient states that his symptoms seem slightly improved today but he was concerned because of how long the symptoms have been present. He is experienced similar symptoms in the past due to both anxiety and his GERD, however he has not had them at the same time or for as long as his current episode. He describes his current symptoms as substernal chest tightness and burning with associated nausea. He also reports feeling "amp up" which he attributes to his anxiety. No other symptoms or complaints. Vital signs reviewed. See physical exam documentation for exam findings. Differential diagnosis includes but is not limited to ACS (less likely given age and lack of risk factors), arrhythmia, anxiety, GERD, pancreatitis, and/or viral GI illness. Will evaluate with CBC, chemistry, lipase, troponin, ECG, and chest x-ray. See ED course for independent interpretation of results. Work-up overall negative. Symptoms improved with GI cocktail. Patient still reports feeling slightly anxious, will give a single dose of Ativan. He states that he has follow-up next week with primary care to start a new medication for his anxiety.  I discussed all findings, treatment, red flags/return precautions, and outpatient follow-up and the patient/family understand and agree. Stable for discharge. Amount and/or Complexity of Data Reviewed  Labs: ordered. Decision-making details documented in ED Course. Radiology: ordered and independent interpretation performed. Decision-making details documented in ED Course. Risk  OTC drugs. Prescription drug management. Disposition  Final diagnoses:   Anxiety   Symptoms of gastroesophageal reflux   GERD (gastroesophageal reflux disease)   Lightheadedness   Nausea     Time reflects when diagnosis was documented in both MDM as applicable and the Disposition within this note       Time User Action Codes Description Comment    11/14/2023  8:44 PM Pegge Schwartz Add [F41.9] Anxiety     11/14/2023  8:44 PM Pegge Schwartz Add [R19.8] Symptoms of gastroesophageal reflux     11/14/2023  8:44 PM Pegge Schwartz Add [K21.9] GERD (gastroesophageal reflux disease)     11/14/2023  8:44 PM Pegge Schwartz Add [R42] Lightheadedness     11/14/2023  8:44 PM Pegge Schwartz Add [R11.0] Nausea           ED Disposition       ED Disposition   Discharge    Condition   Stable    Date/Time   Tue Nov 14, 2023 2045    Comment   Agapito Pat discharge to home/self care. Follow-up Information       Follow up With Specialties Details Why Contact Info Additional [address] [facility],  Family Medicine Call in 1 week For follow-up [address] [facility].   [address] [address] [address] Emergency Department Emergency Medicine Go to  If symptoms worsen [address] [id number] [address] [facility] Emergency Department, [address] [place] [address] Archie Montelongo [facility]            Discharge Medication List as of 11/14/2023  8:54 PM        START taking these medications    Details   ondansetron (ZOFRAN-ODT) 4 mg disintegrating tablet Take 1 tablet (4 mg total) by mouth every 6 (six) hours as needed for nausea or vomiting, Starting Tue 11/14/2023, Normal      sucralfate (CARAFATE) 1 g tablet Take 1 tablet (1 g total) by mouth 4 (four) times a day, Starting Tue 11/14/2023, Normal           CONTINUE these medications which have NOT CHANGED    Details   ALPRAZolam (XANAX) 0.25 mg tablet 1/2-1 tab po bid prn anxiety, Normal      cetirizine (ZyrTEC) 10 mg tablet take 1 tablet by mouth twice a day, Normal      desvenlafaxine succinate (PRISTIQ) 50 mg 24 hr tablet Take 1 tablet (50 mg total) by mouth daily, Starting Tue 11/7/2023, Normal      oxybutynin (DITROPAN) 5 mg tablet TAKE 1 TABLET BY MOUTH TWICE A DAY, Starting Thu 11/2/2023, Normal      pantoprazole (PROTONIX) 40 mg tablet Take 1 tablet (40 mg total) by mouth 2 (two) times a day, Starting Fri 8/18/2023, Normal      rizatriptan (MAXALT) 10 mg tablet Take 1 tablet (10 mg total) by mouth once as needed for migraine for up to 1 dose may repeat in 2 hours if necessary, Starting Thu 3/30/2023, Normal      zolpidem (AMBIEN) 10 mg tablet Take 1 tablet (10 mg total) by mouth daily at bedtime as needed for sleep, Starting Mon 10/23/2023, Normal             No discharge procedures on file.     PDMP Review         Value Time User    PDMP Reviewed  Yes 6/16/2023  8:37 AM Casimiro Durand DO            ED Provider  Electronically Signed by             Kacey Grover MD  11/14/23 [id number]

## 2023-11-17 ENCOUNTER — PATIENT MESSAGE (OUTPATIENT)
Dept: GASTROENTEROLOGY | Facility: CLINIC | Age: 28
End: 2023-11-17

## 2023-11-17 DIAGNOSIS — K58.0 IRRITABLE BOWEL SYNDROME WITH DIARRHEA: Primary | ICD-10-CM

## 2023-11-17 LAB
ATRIAL RATE: 70 BPM
P AXIS: 58 DEGREES
PR INTERVAL: 120 MS
QRS AXIS: 74 DEGREES
QRSD INTERVAL: 90 MS
QT INTERVAL: 362 MS
QTC INTERVAL: 390 MS
T WAVE AXIS: 70 DEGREES
VENTRICULAR RATE: 70 BPM

## 2023-11-17 PROCEDURE — 93010 ELECTROCARDIOGRAM REPORT: CPT | Performed by: INTERNAL MEDICINE

## 2023-11-20 DIAGNOSIS — F41.9 ANXIETY: ICD-10-CM

## 2023-11-20 RX ORDER — ALPRAZOLAM 0.5 MG/1
TABLET ORAL
Qty: 90 TABLET | Refills: 1 | Status: SHIPPED | OUTPATIENT
Start: 2023-11-20

## 2023-11-22 DIAGNOSIS — G47.00 INSOMNIA, UNSPECIFIED TYPE: ICD-10-CM

## 2023-11-22 RX ORDER — ZOLPIDEM TARTRATE 10 MG/1
10 TABLET ORAL
Qty: 30 TABLET | Refills: 0 | Status: SHIPPED | OUTPATIENT
Start: 2023-11-22

## 2023-11-22 NOTE — TELEPHONE ENCOUNTER
1 [id number] 11/20/2023 11/20/2023 ALPRAZolam (Tablet) 90.0 30 0.5 MG NA SHALINIJAINCE VILLEGAS [facility] [employer] 0 / 1 [place]    1 [id number] 11/03/2023 11/02/2023 ALPRAZolam (Tablet) 40.0 20 0.25 MG NA SHALINIJANICE VILLEGAS [facility] [employer] 0 / 0 [place]    1 [id number] 10/25/2023 10/23/2023 Zolpidem Tartrate (Tablet) 30.0 30 10 MG NA ARIES NEVILLE [facility] [employer] 0 / 0 [place]    1 [id number] 10/13/2023 10/13/2023 ALPRAZolam (Tablet) 40.0 20 0.25 MG NA [facility] [employer] 0 / 0 [place]    1 [id number] 09/27/2023 09/27/2023 Zolpidem Tartrate (Tablet) 30.0 30 10 MG NA SHALINI [facility] [employer] 0 / 0 [place]    1 [id number] 09/21/2023 09/21/2023 ALPRAZolam (Tablet) 40.0 20 0.25 MG NA SHALINI [employer]  [facility] 0 / 0 [place]    1 [id number] 08/31/2023 08/31/2023 Zolpidem Tartrate (Tablet) 30.0 30 10 MG NA [employer] [facility] [employer] 0 / 0 [place]    1 [id number] 08/18/2023 08/18/2023 ALPRAZolam (Tablet) 40.0 20 0.25 MG [employer] 0 / 0 [place]    1 [id number] 08/01/2023 08/01/2023 Zolpidem Tartrate (Tablet) 30.0 30 10 MG NA [employer] 0 / 0 [place]    1 [id number] 06/29/2023 06/29/2023 Zolpidem Tartrate (Tablet) 30.0 30 10 MG NA SHALINI [employer] 0 / 0 [place]

## 2023-11-24 ENCOUNTER — TELEPHONE (OUTPATIENT)
Dept: GASTROENTEROLOGY | Facility: CLINIC | Age: 28
End: 2023-11-24

## 2023-11-24 NOTE — TELEPHONE ENCOUNTER
There is an approval for Xifaxan in media on 9/14/2023-- approval good until 9/13/2024    Key [id number] does not come up in CoverMyMeds

## 2023-11-27 PROBLEM — Z12.11 COLON CANCER SCREENING: Status: RESOLVED | Noted: 2023-09-28 | Resolved: 2023-11-27

## 2023-12-14 ENCOUNTER — HOSPITAL ENCOUNTER (OUTPATIENT)
Dept: ULTRASOUND IMAGING | Facility: HOSPITAL | Age: 28
Discharge: HOME | End: 2023-12-14
Payer: COMMERCIAL

## 2023-12-14 DIAGNOSIS — N50.82 SCROTAL PAIN: ICD-10-CM

## 2023-12-14 PROCEDURE — 76870 US EXAM SCROTUM: CPT

## 2023-12-18 DIAGNOSIS — K21.00 GASTROESOPHAGEAL REFLUX DISEASE WITH ESOPHAGITIS: ICD-10-CM

## 2023-12-18 DIAGNOSIS — F41.9 ANXIETY: ICD-10-CM

## 2023-12-19 DIAGNOSIS — K21.00 GASTROESOPHAGEAL REFLUX DISEASE WITH ESOPHAGITIS: ICD-10-CM

## 2023-12-19 RX ORDER — PANTOPRAZOLE SODIUM 40 MG/1
40 TABLET, DELAYED RELEASE ORAL 2 TIMES DAILY
Qty: 60 TABLET | Refills: 0 | Status: SHIPPED | OUTPATIENT
Start: 2023-12-19 | End: 2023-12-19

## 2023-12-19 RX ORDER — ALPRAZOLAM 0.5 MG/1
TABLET ORAL
Qty: 90 TABLET | Refills: 0 | Status: SHIPPED | OUTPATIENT
Start: 2023-12-19

## 2023-12-19 RX ORDER — PANTOPRAZOLE SODIUM 40 MG/1
40 TABLET, DELAYED RELEASE ORAL 2 TIMES DAILY
Qty: 60 TABLET | Refills: 0 | Status: SHIPPED | OUTPATIENT
Start: 2023-12-19

## 2023-12-19 NOTE — TELEPHONE ENCOUNTER
[id number] 11/22/2023 11/22/2023 Zolpidem Tartrate (Tablet) 30.0 30 10 MG NA [facility] Commercial Insurance 0 / 0 PA     1 [id number] 11/20/2023 11/20/2023 ALPRAZolam (Tablet) 90.0 30 0.5 MG NA [facility] Commercial Insurance 0 / 1 PA    1 [id number] 11/03/2023 11/02/2023 ALPRAZolam (Tablet) 40.0 20 0.25 MG NA [facility] Commercial Insurance 0 / 0 PA    1 [id number] 10/25/2023 10/23/2023 Zolpidem Tartrate (Tablet) 30.0 30 10 MG NA [facility] Commercial Insurance 0 / 0 PA    1 [id number] 10/13/2023 10/13/2023 ALPRAZolam (Tablet) 40.0 20 0.25 MG [facility] Commercial Insurance 0 / 0 PA

## 2023-12-20 DIAGNOSIS — G47.00 INSOMNIA, UNSPECIFIED TYPE: ICD-10-CM

## 2023-12-20 RX ORDER — ZOLPIDEM TARTRATE 10 MG/1
10 TABLET ORAL
Qty: 30 TABLET | Refills: 0 | Status: SHIPPED | OUTPATIENT
Start: 2023-12-20

## 2023-12-20 NOTE — TELEPHONE ENCOUNTER
[id number] 11/22/2023 11/22/2023 Zolpidem Tartrate (Tablet) 30.0 30 10 MG NA [facility] Commercial Insurance 0 / 0 PA     1 [id number] 11/20/2023 11/20/2023 ALPRAZolam (Tablet) 90.0 30 0.5 MG NA [facility] Commercial Insurance 0 / 1 PA    1 [id number] 11/03/2023 11/02/2023 ALPRAZolam (Tablet) 40.0 20 0.25 MG NA [facility] Commercial Insurance 0 / 0 PA    1 [id number] 10/25/2023 10/23/2023 Zolpidem Tartrate (Tablet) 30.0 30 10 MG NA [facility] Commercial Insurance 0 / 0 PA    1 [id number] 10/13/2023 10/13/2023 ALPRAZolam (Tablet) 40.0 20 0.25 MG [facility] Commercial Insurance 0 / 0 PA    1 [id number] 09/27/2023 09/27/2023 Zolpidem Tartrate (Tablet) 30.0 30 10 MG [facility] Commercial Insurance 0 / 0 PA

## 2023-12-21 ENCOUNTER — TELEPHONE (OUTPATIENT)
Dept: UROLOGY | Facility: CLINIC | Age: 28
End: 2023-12-21

## 2023-12-21 ENCOUNTER — TELEPHONE (OUTPATIENT)
Age: 28
End: 2023-12-21

## 2023-12-21 DIAGNOSIS — K21.00 GASTROESOPHAGEAL REFLUX DISEASE WITH ESOPHAGITIS: ICD-10-CM

## 2023-12-21 NOTE — TELEPHONE ENCOUNTER
PA for pantoprazole (PROTONIX) 40 mg tablet Approved   Date(s) approved 12/21/23-12/21/24  Case #[id number]    Patient advised by [x] [employer] Message                      [] Phone call       Pharmacy advised by [x]Fax                                     []Phone call    Approval letter scanned into Media Yes

## 2023-12-21 NOTE — TELEPHONE ENCOUNTER
----- Message from Savanna Garcia PA-C sent at 12/21/2023  7:42 AM EST -----  US negative. Follow up as scheduled

## 2023-12-21 NOTE — TELEPHONE ENCOUNTER
Reason for call:   [] Refill   [x] Prior Auth  [] Other:     Office:   [x] PCP/Provider - Lulu Walsh  [] Specialty/Provider -     Medication: pamtoprazole 40 mg, 1 bid

## 2023-12-21 NOTE — TELEPHONE ENCOUNTER
PA for pantoprazole (PROTONIX) 40 mg tablet     Submitted via  []CMM-KEY  [x][employer]   []Faxed to plan   []Other website     Office notes sent, clinical questions answered. Awaiting determination

## 2024-01-16 DIAGNOSIS — G47.00 INSOMNIA, UNSPECIFIED TYPE: ICD-10-CM

## 2024-01-17 RX ORDER — ZOLPIDEM TARTRATE 10 MG/1
10 TABLET ORAL
Qty: 30 TABLET | Refills: 0 | Status: SHIPPED | OUTPATIENT
Start: 2024-01-17

## 2024-01-17 NOTE — TELEPHONE ENCOUNTER
1 [id number] 12/20/2023 12/20/2023 Zolpidem Tartrate (Tablet) 30.0 30 10 MG NA SHALINI VILLEGAS [facility] Commercial Insurance 0 / 0 PA    1 [id number] 12/19/2023 12/19/2023 ALPRAZolam (Tablet) 90.0 30 0.5 MG NA [facility] Commercial Insurance 0 / 0 PA    1 [id number] 11/22/2023 11/22/2023 Zolpidem Tartrate (Tablet) 30.0 30 10 MG NA [facility] Commercial Insurance 0 / 0 PA    1 [id number] 11/20/2023 11/20/2023 ALPRAZolam (Tablet) 90.0 30 0.5 MG [facility] Commercial Insurance 0 / 1 PA    1 [id number] 11/03/2023 11/02/2023 ALPRAZolam (Tablet) 40.0 20 0.25 MG NA SHALINI VILLEGAS [facility] Commercial Insurance 0 / 0 PA    1 [id number] 10/25/2023 10/23/2023 Zolpidem Tartrate (Tablet) 30.0 30 10 MG NA [facility] Commercial Insurance 0 / 0 PA    1 [id number] 10/13/2023 10/13/2023 ALPRAZolam (Tablet) 40.0 20 0.25 MG NA SHALINI NELLY [facility] Commercial Insurance 0 / 0 PA    1 [id number] 09/27/2023 09/27/2023 Zolpidem Tartrate (Tablet) 30.0 30 10 MG NA SHALINI NELLY [facility] Commercial Insurance 0 / 0 PA    1 [id number] 09/21/2023 09/21/2023 ALPRAZolam (Tablet) 40.0 20 0.25 MG NA SHALINI BROADT [employer] Commercial Insurance 0 / 0 PA    1 [id number] 08/31/2023 08/31/2023 Zolpidem Tartrate (Tablet) 30.0 30 10 MG NA SHALINI NERYT [employer] Commercial Insurance 0 / 0 PA

## 2024-01-23 ENCOUNTER — TELEPHONE (OUTPATIENT)
Dept: GASTROENTEROLOGY | Facility: CLINIC | Age: 29
End: 2024-01-23

## 2024-01-28 DIAGNOSIS — F41.9 ANXIETY: ICD-10-CM

## 2024-01-30 NOTE — TELEPHONE ENCOUNTER
1 [id number] 01/17/2024 01/17/2024 Zolpidem Tartrate (Tablet) 30.0 30 10 MG NA [facility] Commercial Insurance 0 / 0 PA    1 [id number] 12/20/2023 12/20/2023 Zolpidem Tartrate (Tablet) 30.0 30 10 MG NA [facility] Commercial Insurance 0 / 0 PA    1 [id number] 12/19/2023 12/19/2023 ALPRAZolam (Tablet) 90.0 30 0.5 MG NA [facility] Commercial Insurance 0 /         Last seen 9/18/18  Last refilled 9/28/18

## 2024-01-31 RX ORDER — ALPRAZOLAM 0.5 MG/1
TABLET ORAL
Qty: 90 TABLET | Refills: 0 | Status: SHIPPED | OUTPATIENT
Start: 2024-01-31

## 2024-02-02 ENCOUNTER — TELEPHONE (OUTPATIENT)
Dept: GASTROENTEROLOGY | Facility: CLINIC | Age: 29
End: 2024-02-02

## 2024-02-02 NOTE — TELEPHONE ENCOUNTER
Lmm for pt to r/s appt [place] 2/15/[place] with Gisell. Providers schedule has changed. Please schedule appt as close to 2/15 as possible.

## 2024-02-13 DIAGNOSIS — G47.00 INSOMNIA, UNSPECIFIED TYPE: ICD-10-CM

## 2024-02-14 NOTE — TELEPHONE ENCOUNTER
1 [id number] 01/31/2024 01/31/2024 ALPRAZolam (Tablet) 90.0 30 0.5 MG [facility] Private Pay 0 / 0 PA    1 [id number] 01/17/2024 01/17/2024 Zolpidem Tartrate (Tablet) 30.0 30 10 MG [facility] Commercial Insurance 0 / 0 PA    1 [id number] 12/20/2023 12/20/2023 Zolpidem Tartrate (Tablet) 30.0 30 10 MG [facility] Commercial Insurance 0 / 0 PA    1 [id number] 12/19/2023 12/19/2023 ALPRAZolam (Tablet) 90.0 30 0.5 MG [facility] Commercial Insurance 0 / 0 PA    1 [id number] 11/22/2023 11/22/2023 Zolpidem Tartrate (Tablet) 30.0 30 10 MG NA [facility] Commercial Insurance 0 / 0 PA    1 [id number] 11/20/2023 11/20/2023 ALPRAZolam (Tablet) 90.0 30 0.5 MG [facility] Commercial Insurance 0 / 1 PA    1 [id number] 11/03/2023 11/02/2023 ALPRAZolam (Tablet) 40.0 20 0.25 MG [facility] Commercial Insurance 0 / 0 PA    1 [id number] 10/25/2023 10/23/2023 Zolpidem Tartrate (Tablet) 30.0 30 10 MG NA [facility] Commercial Insurance 0 / 0 PA    1 [id number] 10/13/2023 10/13/2023 ALPRAZolam (Tablet) 40.0 20 0.25 MG [facility] Commercial Insurance 0 / 0 PA    1 [id number] 09/27/2023 09/27/2023 Zolpidem Tartrate (Tablet) 30.0 30 10 MG [facility] Commercial Insurance 0 / 0 PA

## 2024-02-15 RX ORDER — ZOLPIDEM TARTRATE 10 MG/1
10 TABLET ORAL
Qty: 30 TABLET | Refills: 0 | Status: SHIPPED | OUTPATIENT
Start: 2024-02-15

## 2024-02-27 DIAGNOSIS — F41.9 ANXIETY: ICD-10-CM

## 2024-02-27 RX ORDER — ALPRAZOLAM 0.5 MG/1
TABLET ORAL
Qty: 90 TABLET | Refills: 0 | Status: SHIPPED | OUTPATIENT
Start: 2024-02-27

## 2024-02-27 NOTE — TELEPHONE ENCOUNTER
1 [id number] 02/15/2024 02/15/2024 Zolpidem Tartrate (Tablet) 30.0 30 10 MG [facility] Commercial Insurance 0 / 0 PA    1 [id number] 01/31/2024 01/31/2024 ALPRAZolam (Tablet) 90.0 30 0.5 MG [facility] Private Pay 0 / 0 PA    1 [id number] 01/17/2024 01/17/2024 Zolpidem Tartrate (Tablet) 30.0 30 10 MG NA [facility] Commercial Insurance 0 / 0 PA    1 [id number] 12/20/2023 12/20/2023 Zolpidem Tartrate (Tablet) 30.0 30 10 MG [facility] Commercial Insurance 0 / 0 PA    1 [id number] 12/19/2023 12/19/2023 ALPRAZolam (Tablet) 90.0 30 0.5 MG [facility] Commercial Insurance 0 / 0 PA    1 [id number] 11/22/2023 11/22/2023 Zolpidem Tartrate (Tablet) 30.0 30 10 MG NA [facility] Commercial Insurance 0 / 0 PA    1 [id number] 11/20/2023 11/20/2023 ALPRAZolam (Tablet) 90.0 30 0.5 MG [facility] Commercial Insurance 0 / 1 PA    1 [id number] 11/03/2023 11/02/2023 ALPRAZolam (Tablet) 40.0 20 0.25 MG [facility] Commercial Insurance 0 / 0 PA    1 [id number] 10/25/2023 10/23/2023 Zolpidem Tartrate (Tablet) 30.0 30 10 MG NA [facility] Commercial Insurance 0 / 0 PA    1 [id number] 10/13/2023 10/13/2023 ALPRAZolam (Tablet) 40.0 20 0.25 MG NA SHALINI VILLEGAS [facility] Commercial Insurance 0 / 0 PA

## 2024-03-11 DIAGNOSIS — G47.00 INSOMNIA, UNSPECIFIED TYPE: ICD-10-CM

## 2024-03-11 NOTE — TELEPHONE ENCOUNTER
1 [id number] 02/28/2024 02/27/2024 ALPRAZolam (Tablet) 90.0 30 0.5 [facility] Commercial Insurance 0 / 0 PA    1 [id number] 02/15/2024 02/15/2024 Zolpidem Tartrate (Tablet) 30.0 30 10 MG [facility] Commercial Insurance 0 / 0 PA    1 [id number] 01/31/2024 01/31/2024 ALPRAZolam (Tablet) 90.0 30 0.5 MG [facility] Private Pay 0 / 0 PA    1 [id number] 01/17/2024 01/17/2024 Zolpidem Tartrate (Tablet) 30.0 30 10 MG [facility] Commercial Insurance 0 / 0 PA    1 [id number] 12/20/2023 12/20/2023 Zolpidem Tartrate (Tablet) 30.0 30 10 [facility] Commercial Insurance 0 / 0 PA    1 [id number] 12/19/2023 12/19/2023 ALPRAZolam (Tablet) 90.0 30 0.5 [facility] Commercial Insurance 0 / 0 PA    1 [id number] 11/22/2023 11/22/2023 Zolpidem Tartrate (Tablet) 30.0 30 10 MG NA [facility] .[facility]. Commercial Insurance 0 / 0 PA    1 [id number] 11/20/2023 11/20/2023 ALPRAZolam (Tablet) 90.0 30 0.5 MG [facility] Commercial Insurance 0 / 1 PA    1 [id number] 11/03/2023 11/02/2023 ALPRAZolam (Tablet) 40.0 20 0.25 MG [facility] Commercial Insurance 0 / 0 PA    1 [id number] 10/25/2023 10/23/2023 Zolpidem Tartrate (Tablet) 30.0 30 10 MG NA ARIES NEVILLE [facility] Commercial Insurance 0 / 0 PA

## 2024-03-12 RX ORDER — ZOLPIDEM TARTRATE 10 MG/1
10 TABLET ORAL
Qty: 30 TABLET | Refills: 0 | Status: SHIPPED | OUTPATIENT
Start: 2024-03-12

## 2024-03-29 DIAGNOSIS — F41.9 ANXIETY: ICD-10-CM

## 2024-03-29 RX ORDER — ALPRAZOLAM 0.5 MG/1
TABLET ORAL
Qty: 90 TABLET | Refills: 0 | Status: SHIPPED | OUTPATIENT
Start: 2024-03-29

## 2024-03-29 NOTE — TELEPHONE ENCOUNTER
1 [id number] 03/13/2024 03/12/2024 Zolpidem Tartrate (Tablet) 30.0 30 10 MG NA [facility] Commercial Insurance 0 / 0 PA    1 [id number] 02/28/2024 02/27/2024 ALPRAZolam (Tablet) 90.0 30 0.5 MG NA [facility] .[facility]. Commercial Insurance 0 / 0 PA    1 [id number] 02/15/2024 02/15/2024 Zolpidem Tartrate (Tablet) 30.0 30 10 MG NA [facility] Commercial Insurance 0 / 0 PA    1 [id number] 01/31/2024 01/31/2024 ALPRAZolam (Tablet) 90.0 30 0.5 MG NA [facility] Private Pay 0 / 0 PA    1 [id number] 01/17/2024 01/17/2024 Zolpidem Tartrate (Tablet) 30.0 30 10 MG NA [facility] Commercial Insurance 0 / 0 PA    1 [id number] 12/20/2023 12/20/2023 Zolpidem Tartrate (Tablet) 30.0 30 10 MG [facility] Commercial Insurance 0 / 0 PA    1 [id number] 12/19/2023 12/19/2023 ALPRAZolam (Tablet) 90.0 30 0.5 MG NA [facility] Commercial Insurance 0 / 0 PA    1 [id number] 11/22/2023 11/22/2023 Zolpidem Tartrate (Tablet) 30.0 30 10 MG NA ARIES [facility] Commercial Insurance 0 / 0 PA    1 [id number] 11/20/2023 11/20/2023 ALPRAZolam (Tablet) 90.0 30 0.5 MG NA SHALINI [facility] Commercial Insurance 0 / 1 PA    1 [id number] 11/03/2023 11/02/2023 ALPRAZolam (Tablet) 40.0 20 0.25 MG NA SHALINI BROADT [facility] Commercial Insurance 0 / 0

## 2024-03-30 ENCOUNTER — TELEPHONE (OUTPATIENT)
Dept: OTHER | Facility: OTHER | Age: 29
End: 2024-03-30

## 2024-03-30 DIAGNOSIS — F41.9 ANXIETY: ICD-10-CM

## 2024-03-30 NOTE — TELEPHONE ENCOUNTER
Medication Refill Request     Name Pristiq   Dose/Frequency 1 x daily  Quantity 30  Verified pharmacy   [x]  Verified ordering Provider   [x]  Does patient have enough for the next 3 days? Yes [] No [x]    Pt has enough until this Sunday, would like it refilled Monday if possible.

## 2024-04-01 DIAGNOSIS — F41.9 ANXIETY: ICD-10-CM

## 2024-04-01 RX ORDER — DESVENLAFAXINE SUCCINATE 50 MG/1
50 TABLET, EXTENDED RELEASE ORAL DAILY
Qty: 30 TABLET | Refills: 5 | Status: SHIPPED | OUTPATIENT
Start: 2024-04-01

## 2024-04-01 RX ORDER — DESVENLAFAXINE SUCCINATE 50 MG/1
50 TABLET, EXTENDED RELEASE ORAL DAILY
Qty: 30 TABLET | Refills: 5 | Status: SHIPPED | OUTPATIENT
Start: 2024-04-01 | End: 2024-04-01 | Stop reason: SDUPTHER

## 2024-04-10 DIAGNOSIS — G47.00 INSOMNIA, UNSPECIFIED TYPE: ICD-10-CM

## 2024-04-10 RX ORDER — ZOLPIDEM TARTRATE 10 MG/1
10 TABLET ORAL
Qty: 30 TABLET | Refills: 0 | Status: SHIPPED | OUTPATIENT
Start: 2024-04-10

## 2024-04-24 DIAGNOSIS — F41.9 ANXIETY: ICD-10-CM

## 2024-04-24 RX ORDER — DESVENLAFAXINE SUCCINATE 50 MG/1
50 TABLET, EXTENDED RELEASE ORAL DAILY
Qty: 90 TABLET | Refills: 1 | Status: SHIPPED | OUTPATIENT
Start: 2024-04-24

## 2024-05-06 DIAGNOSIS — G47.00 INSOMNIA, UNSPECIFIED TYPE: ICD-10-CM

## 2024-05-07 RX ORDER — ZOLPIDEM TARTRATE 10 MG/1
10 TABLET ORAL
Qty: 30 TABLET | Refills: 0 | Status: SHIPPED | OUTPATIENT
Start: 2024-05-07

## 2024-05-07 NOTE — TELEPHONE ENCOUNTER
1 [id number] 04/10/2024 04/10/2024 Zolpidem Tartrate (Tablet) 30.0 30 10 MG NA SHALINI [facility] Commercial Insurance 0 / 0 PA    1 [id number] 03/29/2024 03/29/2024 ALPRAZolam (Tablet) 90.0 30 0.5 MG NA [facility], [facility] Commercial Insurance 0 / 0 PA    1 [id number] 03/13/2024 03/12/2024 Zolpidem Tartrate (Tablet) 30.0 30 10 MG NA [facility], .[facility]. Commercial Insurance 0 / 0 PA    1 [id number] 02/28/2024 02/27/2024 ALPRAZolam (Tablet) 90.0 30 0.5 MG [facility] Commercial Insurance 0 / 0 PA    1 [id number] 02/15/2024 02/15/2024 Zolpidem Tartrate (Tablet) 30.0 30 10 MG NA [facility], .[facility]. Commercial Insurance 0 / 0 PA    1 [id number] 01/31/2024 01/31/2024 ALPRAZolam (Tablet) 90.0 30 0.5 MG [facility] Private Pay 0 / 0 PA    1 [id number] 01/17/2024 01/17/2024 Zolpidem Tartrate (Tablet) 30.0 30 10 MG [facility], .[facility]. Commercial Insurance 0 / 0 PA    1 [id number] 12/20/2023 12/20/2023 Zolpidem Tartrate (Tablet) 30.0 30 10 MG NA [facility], .[facility]. Commercial Insurance 0 / 0 PA    1 [id number] 12/19/2023 12/19/2023 ALPRAZolam (Tablet) 90.0 30 0.5 MG NA SHALINI [facility], .[facility]. Commercial Insurance 0 / 0 PA    1 [id number] 11/22/2023 11/22/2023 Zolpidem Tartrate (Tablet) 30.0 30 10 MG NA ARIES KELIN [facility] Commercial Insurance 0 / 0 PA

## 2024-05-19 DIAGNOSIS — F41.9 ANXIETY: ICD-10-CM

## 2024-05-20 RX ORDER — ALPRAZOLAM 0.5 MG/1
TABLET ORAL
Qty: 90 TABLET | Refills: 0 | Status: SHIPPED | OUTPATIENT
Start: 2024-05-20

## 2024-05-20 NOTE — TELEPHONE ENCOUNTER
Refill must be reviewed and completed by the office or provider. The refill is unable to be approved or denied by the medication management team.    CANNOT BE DELEGATED

## 2024-05-20 NOTE — TELEPHONE ENCOUNTER
1 [id number] 05/07/2024 05/07/2024 Zolpidem Tartrate (Tablet) 30.0 30 10 MG [facility] Commercial Insurance 0 / 0 PA    1 [id number] 04/10/2024 04/10/2024 Zolpidem Tartrate (Tablet) 30.0 30 10 MG [facility], ..C. Commercial Insurance 0 / 0 PA    1 [id number] 03/29/2024 03/29/2024 ALPRAZolam (Tablet) 90.0 30 0.5 MG NA [facility] .[facility]. Commercial Insurance 0 / 0 PA    1 [id number] 03/13/2024 03/12/2024 Zolpidem Tartrate (Tablet) 30.0 30 10 MG [facility], .[facility]. Commercial Insurance 0 / 0 PA    1 [id number] 02/28/2024 02/27/2024 ALPRAZolam (Tablet) 90.0 30 0.5 MG [facility], .[facility]. Commercial Insurance 0 / 0 PA    1 [id number] 02/15/2024 02/15/2024 Zolpidem Tartrate (Tablet) 30.0 30 10 MG [facility], .[facility]. Commercial Insurance 0 / 0 PA    1 [id number] 01/31/2024 01/31/2024 ALPRAZolam (Tablet) 90.0 30 0.5 MG [facility], .[facility]. Private Pay 0 / 0 PA    1 [id number] 01/17/2024 01/17/2024 Zolpidem Tartrate (Tablet) 30.0 30 10 MG [facility], .[facility]. Commercial Insurance 0 / 0 PA    1 [id number] 12/20/2023 12/20/2023 Zolpidem Tartrate (Tablet) 30.0 30 10 MG NA [facility], .[facility]. Commercial Insurance 0 / 0 PA    1 [id number] 12/19/2023 12/19/2023 ALPRAZolam (Tablet) 90.0 30 0.5 MG NA SHALINI BROADT [facility] Commercial Insurance 0 / 0 PA

## 2024-06-02 DIAGNOSIS — G47.00 INSOMNIA, UNSPECIFIED TYPE: ICD-10-CM

## 2024-06-03 RX ORDER — ZOLPIDEM TARTRATE 10 MG/1
10 TABLET ORAL
Qty: 30 TABLET | Refills: 0 | Status: SHIPPED | OUTPATIENT
Start: 2024-06-03

## 2024-06-03 NOTE — TELEPHONE ENCOUNTER
1 [id number] 05/20/2024 05/20/2024 ALPRAZolam (Tablet) 90.0 30 0.5 MG [facility] Commercial Insurance 0 / 0 PA    1 [id number] 05/07/2024 05/07/2024 Zolpidem Tartrate (Tablet) 30.0 30 10 MG [facility], .[facility]. Commercial Insurance 0 / 0 PA    1 [id number] 04/10/2024 04/10/2024 Zolpidem Tartrate (Tablet) 30.0 30 10 MG [facility] Commercial Insurance 0 / 0 PA    1 [id number] 03/29/2024 03/29/2024 ALPRAZolam (Tablet) 90.0 30 0.5 MG [facility] Commercial Insurance 0 / 0 PA    1 [id number] 03/13/2024 03/12/2024 Zolpidem Tartrate (Tablet) 30.0 30 10 MG [facility] Commercial Insurance 0 / 0 PA    1 [id number] 02/28/2024 02/27/2024 ALPRAZolam (Tablet) 90.0 30 0.5 MG [facility] Commercial Insurance 0 / 0 PA    1 [id number] 02/15/2024 02/15/2024 Zolpidem Tartrate (Tablet) 30.0 30 10 MG [facility] Commercial Insurance 0 / 0 PA    1 [id number] 01/31/2024 01/31/2024 ALPRAZolam (Tablet) 90.0 30 0.5 MG [facility] Private Pay 0 / 0 PA    1 [id number] 01/17/2024 01/17/2024 Zolpidem Tartrate (Tablet) 30.0 30 10 MG NA [facility], .[facility]. Commercial Insurance 0 / 0 PA    1 [id number] 12/20/2023 12/20/2023 Zolpidem Tartrate (Tablet) 30.0 30 10 MG NA SHALINI BROADT [facility] Commercial Insurance 0 / 0 PA

## 2024-06-17 DIAGNOSIS — F41.9 ANXIETY: ICD-10-CM

## 2024-06-18 RX ORDER — ALPRAZOLAM 0.5 MG/1
TABLET ORAL
Qty: 90 TABLET | Refills: 0 | Status: SHIPPED | OUTPATIENT
Start: 2024-06-18

## 2024-06-18 NOTE — TELEPHONE ENCOUNTER
1 [id number] 06/03/2024 06/03/2024 Zolpidem Tartrate (Tablet) 30.0 30 10 MG NA ARIES NEVILLE [facility] Commercial Insurance 0 / 0 PA    1 [id number] 05/20/2024 05/20/2024 ALPRAZolam (Tablet) 90.0 30 0.5 MG NA [facility] Commercial Insurance 0 / 0 PA    1 [id number] 05/07/2024 05/07/2024 Zolpidem Tartrate (Tablet) 30.0 30 10 MG NA [facility] Commercial Insurance 0 / 0 PA    1 [id number] 04/10/2024 04/10/2024 Zolpidem Tartrate (Tablet) 30.0 30 10 MG [facility] Commercial Insurance 0 / 0 PA    1 [id number] 03/29/2024 03/29/2024 ALPRAZolam (Tablet) 90.0 30 0.5 MG [facility] Commercial Insurance 0 / 0 PA    1 [id number] 03/13/2024 03/12/2024 Zolpidem Tartrate (Tablet) 30.0 30 10 MG NA [facility] Commercial Insurance 0 / 0 PA    1 [id number] 02/28/2024 02/27/2024 ALPRAZolam (Tablet) 90.0 30 0.5 MG [facility] Commercial Insurance 0 / 0 PA    1 [id number] 02/15/2024 02/15/2024 Zolpidem Tartrate (Tablet) 30.0 30 10 MG NA [facility] Commercial Insurance 0 / 0 PA    1 [id number] 01/31/2024 01/31/2024 ALPRAZolam (Tablet) 90.0 30 0.5 MG NA [facility] Private Pay 0 / 0 PA    1 [id number] 01/17/2024 01/17/2024 Zolpidem Tartrate (Tablet) 30.0 30 10 MG NA SHALINI BROADT [facility] Commercial Insurance 0 / 0 PA

## 2024-06-21 DIAGNOSIS — F41.9 ANXIETY: Primary | ICD-10-CM

## 2024-06-21 RX ORDER — CLONAZEPAM 0.5 MG/1
0.5 TABLET ORAL 2 TIMES DAILY PRN
Qty: 60 TABLET | Refills: 0 | Status: SHIPPED | OUTPATIENT
Start: 2024-06-21

## 2024-07-01 DIAGNOSIS — G47.00 INSOMNIA, UNSPECIFIED TYPE: ICD-10-CM

## 2024-07-02 RX ORDER — ZOLPIDEM TARTRATE 10 MG/1
10 TABLET ORAL
Qty: 30 TABLET | Refills: 0 | Status: SHIPPED | OUTPATIENT
Start: 2024-07-02

## 2024-07-02 NOTE — TELEPHONE ENCOUNTER
Pt has no future appts & has not been seen in over a year- pls advise.    1 [id number] 06/21/2024 06/21/2024 clonazePAM (Tablet) 60.0 30 0.5 MG NA [facility] Commercial Insurance 0 / 0 PA    1 [id number] 06/18/2024 06/18/2024 ALPRAZolam (Tablet) 90.0 30 0.5 MG NA [facility] Commercial Insurance 0 / 0 PA    1 [id number] 06/03/2024 06/03/2024 Zolpidem Tartrate (Tablet) 30.0 30 10 MG NA [facility] Commercial Insurance 0 / 0 PA    1 [id number] 05/20/2024 05/20/2024 ALPRAZolam (Tablet) 90.0 30 0.5 MG NA [facility] Commercial Insurance 0 / 0 PA    1 [id number] 05/07/2024 05/07/2024 Zolpidem Tartrate (Tablet) 30.0 30 10 MG NA [facility] Commercial Insurance 0 / 0 PA    1 [id number] 04/10/2024 04/10/2024 Zolpidem Tartrate (Tablet) 30.0 30 10 MG NA [facility] Commercial Insurance 0 / 0 PA    1 [id number] 03/29/2024 03/29/2024 ALPRAZolam (Tablet) 90.0 30 0.5 MG NA [facility] Commercial Insurance 0 / 0 PA    1 [id number] 03/13/2024 03/12/2024 Zolpidem Tartrate (Tablet) 30.0 30 10 MG [facility] Commercial Insurance 0 / 0 PA    1 [id number] 02/28/2024 02/27/2024 ALPRAZolam (Tablet) 90.0 30 0.5 MG NA [facility] Commercial Insurance 0 / 0 PA    1 [id number] 02/15/2024 02/15/2024 Zolpidem Tartrate (Tablet) 30.0 30 10 MG NA SHALINI VILLEGAS [facility] Commercial Insurance 0 / 0 PA

## 2024-07-17 DIAGNOSIS — F41.9 ANXIETY: ICD-10-CM

## 2024-07-18 RX ORDER — CLONAZEPAM 0.5 MG/1
0.5 TABLET ORAL 2 TIMES DAILY PRN
Qty: 60 TABLET | Refills: 0 | Status: SHIPPED | OUTPATIENT
Start: 2024-07-18

## 2024-07-18 NOTE — TELEPHONE ENCOUNTER
1 [id number] 07/02/2024 07/02/2024 Zolpidem Tartrate (Tablet) 30.0 30 10 MG NA SHANNON [facility] Commercial Insurance 0 / 0 PA    1 [id number] 06/21/2024 06/21/2024 clonazePAM (Tablet) 60.0 30 0.5 MG NA [facility], [facility] Commercial Insurance 0 / 0 PA    1 [id number] 06/18/2024 06/18/2024 ALPRAZolam (Tablet) 90.0 30 0.5 MG NA [facility] Commercial Insurance 0 / 0 PA    1 [id number] 06/03/2024 06/03/2024 Zolpidem Tartrate (Tablet) 30.0 30 10 MG NA ARIES [facility] Commercial Insurance 0 / 0 PA    1 [id number] 05/20/2024 05/20/2024 ALPRAZolam (Tablet) 90.0 30 0.5 MG NA [facility] Commercial Insurance 0 / 0 PA    1 [id number] 05/07/2024 05/07/2024 Zolpidem Tartrate (Tablet) 30.0 30 10 MG NA [facility] Commercial Insurance 0 / 0 PA    1 [id number] 04/10/2024 04/10/2024 Zolpidem Tartrate (Tablet) 30.0 30 10 MG NA [facility] Commercial Insurance 0 / 0 PA    1 [id number] 03/29/2024 03/29/2024 ALPRAZolam (Tablet) 90.0 30 0.5 MG NA [facility] Commercial Insurance 0 / 0 PA    1 [id number] 03/13/2024 03/12/2024 Zolpidem Tartrate (Tablet) 30.0 30 10 MG NA SHALINI [facility], [facility] Commercial Insurance 0 / 0 PA    1 [id number] 02/28/2024 02/27/2024 ALPRAZolam (Tablet) 90.0 30 0.5 MG NA SHALINI BROADT [facility] Commercial Insurance 0 / 0 PA       Thanks for coming to [facility] [place].  It has been a pleasure and privilege serving you.     If you have any questions or concerns, please call us at [phone] or [phone].   Please visit us at our website at [email or web address] for more information.     Diagnosis for Pacheco is : No diagnosis found.       TREATMENT PLAN    NON-MEDICATION TREATMENTS:   Ice: 15-20 minutes/time, at least 3x/day (especially after activity), but may use every 3 hours while awake. Use a layer between ice and skin.  Heat: May try moist heat x 10-15 minutes, 3-4 x/day once you are 72 hours out from injury  Activities as tolerated, avoid aggravating activities, limit activities based on pain level, should note exceed 3/10 level consistently.       REHABILITATION EXERCISES:  Continue with formal therapy.    FOLLOW UP APPOINTMENTS:  As needed

## 2024-07-30 DIAGNOSIS — G47.00 INSOMNIA, UNSPECIFIED TYPE: ICD-10-CM

## 2024-08-01 DIAGNOSIS — G47.00 INSOMNIA, UNSPECIFIED TYPE: ICD-10-CM

## 2024-08-01 RX ORDER — ZOLPIDEM TARTRATE 10 MG/1
10 TABLET ORAL
Qty: 30 TABLET | Refills: 0 | Status: SHIPPED | OUTPATIENT
Start: 2024-08-01 | End: 2024-08-01 | Stop reason: SDUPTHER

## 2024-08-01 NOTE — TELEPHONE ENCOUNTER
"Pt stated, \"I already left for [place] and was unable to get my medicine. Is it possible for the provider to transfers my medication to this pharmacy? [facility]   Address: [address], [place] [address]  Phone: [phone]\"    Please call pt when office reopens     Medication Refill Request     Name zolpidem (AMBIEN) 10 mg tablet   Dose/Frequency 10 mg/Daily at bedtime  Quantity 30 tablet  Verified pharmacy   [x]  Verified ordering Provider   [x]  Does patient have enough for the next 3 days? Yes [] No [x]  "

## 2024-08-02 RX ORDER — ZOLPIDEM TARTRATE 10 MG/1
10 TABLET ORAL
Qty: 30 TABLET | Refills: 0 | Status: SHIPPED | OUTPATIENT
Start: 2024-08-02

## 2024-08-02 NOTE — TELEPHONE ENCOUNTER
1 [id number] 07/18/2024 07/18/2024 clonazePAM (Tablet) 60.0 30 0.5 MG NA [facility] [place] 0 / 0 [place]    1 [id number] 07/02/2024 07/02/2024 Zolpidem Tartrate (Tablet) 30.0 30 10 MG NA SHANNON [facility] Commercial [place] 0 / 0 [place]    1 [id number] 06/21/2024 06/21/2024 clonazePAM (Tablet) 60.0 30 0.5 MG NA [facility] Commercial [place] / 0 [place]    1 [id number] 06/18/2024 06/18/2024 ALPRAZolam (Tablet) 90.0 30 0.5 MG NA [facility] [place] / 0 [place]    1 [id number] 06/03/2024 06/03/2024 Zolpidem Tartrate (Tablet) 30.0 30 10 MG NA [facility]ES [facility] [place] / 0 [place]    1 [id number] 05/20/2024 05/20/2024 ALPRAZolam (Tablet) 90.0 30 0.5 MG NA [facility] [place] / 0 [place]    1 [id number] 05/07/2024 05/07/2024 Zolpidem Tartrate (Tablet) 30.0 30 10 MG [facility] Commercial Insurance 0 / 0 [place]    1 [id number] 04/10/2024 04/10/2024 Zolpidem Tartrate (Tablet) 30.0 30 10 MG NA [facility] Commercial [place] / [place]    1 [id number] 03/29/2024 03/29/2024 ALPRAZolam (Tablet) 90.0 30 0.5 MG NA [facility] [place] / [place]    1 [id number] 03/13/2024 03/12/2024 Zolpidem Tartrate (Tablet) 30.0 30 10 MG NA SHALINI [facility] Commercial [place] 0 / 0 [place]

## 2024-08-02 NOTE — TELEPHONE ENCOUNTER
Please note-not a duplicate. Patient has travelled to [place] and is now out of medication. Requesting small supply be sent to  pharmacy- [address], [place] [address]     As his script was not refilled prior to him leaving for vacation.

## 2024-08-05 ENCOUNTER — TELEPHONE (OUTPATIENT)
Dept: FAMILY MEDICINE CLINIC | Facility: CLINIC | Age: 29
End: 2024-08-05

## 2024-08-05 NOTE — TELEPHONE ENCOUNTER
Patient called requesting refill for zolpidem . Patient made aware medication was refilled on 8/2/24 for 30 with 0 refills to [facility]acy #[id number] [place]  [facility]acy. Patient instructed to contact the pharmacy to obtain refills of medication. Patient verbalized understanding.

## 2024-08-16 DIAGNOSIS — F41.9 ANXIETY: ICD-10-CM

## 2024-08-16 RX ORDER — CLONAZEPAM 0.5 MG/1
0.5 TABLET ORAL 2 TIMES DAILY PRN
Qty: 60 TABLET | Refills: 0 | Status: SHIPPED | OUTPATIENT
Start: 2024-08-16

## 2024-08-16 NOTE — TELEPHONE ENCOUNTER
1 [id number] 08/05/2024 08/02/2024 Zolpidem Tartrate (Tablet) 30.0 30 10 MG [facility] [place] 0 / 0 [place]    1 [id number] 07/18/2024 07/18/2024 clonazePAM (Tablet) 60.0 30 0.5 MG NA SHALINI [facility] [place] / 0 [place]    1 [id number] 07/02/2024 07/02/2024 Zolpidem Tartrate (Tablet) 30.0 30 10 MG NA LUCIANOPEJAMIL [facility] [place] 0 / 0 [place]    1 [id number] 06/21/2024 06/21/2024 clonazePAM (Tablet) 60.0 30 0.5 MG [facility] [place] 0 / 0 [place]    1 [id number] 06/18/2024 06/18/2024 ALPRAZolam (Tablet) 90.0 30 0.5 MG NA [facility] Commercial [place] / 0 [place]    1 [id number] 06/03/2024 06/03/2024 Zolpidem Tartrate (Tablet) 30.0 30 10 MG NA ARIES NEVILLE [facility] [place] / 0 [place]    1 [id number] 05/20/2024 05/20/2024 ALPRAZolam (Tablet) 90.0 30 0.5 MG NA [facility] [place] / [place]    1 [id number] 05/07/2024 05/07/2024 Zolpidem Tartrate (Tablet) 30.0 30 10 MG NA [facility] Commercial [place] / 0 [place]    1 [id number] 04/10/2024 04/10/2024 Zolpidem Tartrate (Tablet) 30.0 30 10 MG NA SHALINI [facility] [place] / [place]    1 [id number] 03/29/2024 03/29/2024 ALPRAZolam (Tablet) 90.0 30 0.5 MG NA SHALINI BROAD [facility] Commercial Insurance 0 / 0

## 2024-09-02 DIAGNOSIS — G47.00 INSOMNIA, UNSPECIFIED TYPE: ICD-10-CM

## 2024-09-03 RX ORDER — ZOLPIDEM TARTRATE 10 MG/1
10 TABLET ORAL
Qty: 30 TABLET | Refills: 0 | Status: SHIPPED | OUTPATIENT
Start: 2024-09-03

## 2024-09-03 NOTE — TELEPHONE ENCOUNTER
1 [id number] 08/16/2024 08/16/2024 clonazePAM (Tablet) 60.0 30 0.5 MG NA [facility] [place] / [place]    1 [id number] 08/05/2024 08/02/2024 Zolpidem Tartrate (Tablet) 30.0 30 10 MG NA [facility] [place] / [place]    1 [id number] 07/18/2024 07/18/2024 clonazePAM (Tablet) 60.0 30 0.5 MG NA [facility] [place] / [place]    1 [id number] 07/02/2024 07/02/2024 Zolpidem Tartrate (Tablet) 30.0 30 10 MG NA [facility] Commercial [place] 0 / 0 [place]    1 [id number] 06/21/2024 06/21/2024 clonazePAM (Tablet) 60.0 30 0.5 MG [facility] [place] 0 / 0 [place]    1 [id number] 06/18/2024 06/18/2024 ALPRAZolam (Tablet) 90.0 30 0.5 MG [facility] Commercial [place] 0 / 0 [place]    1 [id number] 06/03/2024 06/03/2024 Zolpidem Tartrate (Tablet) 30.0 30 10 MG NA ARIES NEVILLE [facility] Commercial [place] 0 / 0 [place]    1 [id number] 05/20/2024 05/20/2024 ALPRAZolam (Tablet) 90.0 30 0.5 MG NA [facility] [place] / [place]    1 [id number] 05/07/2024 05/07/2024 Zolpidem Tartrate (Tablet) 30.0 30 10 MG NA [facility] Commercial [place] 0 / 0 [place]    1 [id number] 04/10/2024 04/10/2024 Zolpidem Tartrate (Tablet) 30.0 30 10 MG NA [facility] Commercial [place] 0 / 0 [place]

## 2024-09-14 DIAGNOSIS — F41.9 ANXIETY: ICD-10-CM

## 2024-09-16 RX ORDER — CLONAZEPAM 0.5 MG/1
0.5 TABLET ORAL 2 TIMES DAILY PRN
Qty: 60 TABLET | Refills: 0 | Status: SHIPPED | OUTPATIENT
Start: 2024-09-16

## 2024-10-03 DIAGNOSIS — G47.00 INSOMNIA, UNSPECIFIED TYPE: ICD-10-CM

## 2024-10-03 RX ORDER — ZOLPIDEM TARTRATE 10 MG/1
10 TABLET ORAL
Qty: 30 TABLET | Refills: 0 | Status: SHIPPED | OUTPATIENT
Start: 2024-10-03

## 2024-10-03 NOTE — TELEPHONE ENCOUNTER
1 [id number] 09/16/2024 09/16/2024 clonazePAM (Tablet) 60.0 30 0.5 MG NA [facility] [place] / [place]    1 [id number] 09/03/2024 09/03/2024 Zolpidem Tartrate (Tablet) 30.0 30 10 MG NA [facility] Commercial [place] 0 / 0 [place]    1 [id number] 08/16/2024 08/16/2024 clonazePAM (Tablet) 60.0 30 0.5 MG NA [facility] Commercial [place] 0 / 0 [place]    1 [id number] 08/05/2024 08/02/2024 Zolpidem Tartrate (Tablet) 30.0 30 10 MG [facility] [place] 0 / 0 [place]    1 [id number] 07/18/2024 07/18/2024 clonazePAM (Tablet) 60.0 30 0.5 MG NA [facility] Commercial Insurance 0 / 0 [place]    1 [id number] 07/02/2024 07/02/2024 Zolpidem Tartrate (Tablet) 30.0 30 10 MG NA [facility] Commercial [place] 0 / 0 [place]    1 [id number] 06/21/2024 06/21/2024 clonazePAM (Tablet) 60.0 30 0.5 MG [facility] Commercial Insurance 0 / 0 [place]    1 [id number] 06/18/2024 06/18/2024 ALPRAZolam (Tablet) 90.0 30 0.5 MG NA [facility] Commercial Insurance 0 / 0 [place]    1 [id number] 06/03/2024 06/03/2024 Zolpidem Tartrate (Tablet) 30.0 30 10 MG NA ARIES [facility] [place] / 0 [place]    1 [id number] 05/20/2024 05/20/2024 ALPRAZolam (Tablet) 90.0 30 0.5 MG NA [facility] Commercial Insurance 0 / 0 [place]

## 2024-10-14 DIAGNOSIS — F41.9 ANXIETY: ICD-10-CM

## 2024-10-15 RX ORDER — CLONAZEPAM 0.5 MG/1
0.5 TABLET ORAL 2 TIMES DAILY PRN
Qty: 60 TABLET | Refills: 0 | Status: SHIPPED | OUTPATIENT
Start: 2024-10-15 | End: 2024-10-21 | Stop reason: SDUPTHER

## 2024-10-15 NOTE — TELEPHONE ENCOUNTER
1 [id number] 10/03/2024 10/03/2024 Zolpidem Tartrate (Tablet) 30.0 30 10 MG [facility] [place] 0 / 0 [place]    1 [id number] 09/16/2024 09/16/2024 clonazePAM (Tablet) 60.0 30 0.5 MG NA [facility] Commercial [place] 0 / 0 [place]    1 [id number] 09/03/2024 09/03/2024 Zolpidem Tartrate (Tablet) 30.0 30 10 MG NA [facility] Commercial [place] 0 / 0 [place]    1 [id number] 08/16/2024 08/16/2024 clonazePAM (Tablet) 60.0 30 0.5 MG NA [facility] Commercial [place] 0 / 0 [place]    1 [id number] 08/05/2024 08/02/2024 Zolpidem Tartrate (Tablet) 30.0 30 10 MG [facility] Commercial [place] 0 / 0 [place]    1 [id number] 07/18/2024 07/18/2024 clonazePAM (Tablet) 60.0 30 0.5 MG NA [facility] Commercial [place] 0 / 0 [place]    1 [id number] 07/02/2024 07/02/2024 Zolpidem Tartrate (Tablet) 30.0 30 10 MG NA [facility] [place] 0 / 0 [place]    1 [id number] 06/21/2024 06/21/2024 clonazePAM (Tablet) 60.0 30 0.5 MG [facility] Commercial [place] 0 / 0 [place]    1 [id number] 06/18/2024 06/18/2024 ALPRAZolam (Tablet) 90.0 30 0.5 MG [facility] Commercial [place] 0 / 0 [place]    1 [id number] 06/03/2024 06/03/2024 Zolpidem Tartrate (Tablet) 30.0 30 10 MG NA [facility] Commercial [place] 0 / 0 [place]

## 2024-10-19 ENCOUNTER — NURSE TRIAGE (OUTPATIENT)
Dept: OTHER | Facility: OTHER | Age: 29
End: 2024-10-19

## 2024-10-19 NOTE — TELEPHONE ENCOUNTER
"Reason for Disposition   [1] Caller has NON-URGENT medicine question about med that PCP prescribed AND [2] triager unable to answer question    Answer Assessment - Initial Assessment Questions  1. NAME of MEDICINE: \"What medicine(s) are you calling about?\"      klonopin  2. QUESTION: \"What is your question?\" (e.g., double dose of medicine, side effect)      Pt accidentally left klonopin and sunglasses on car. Pt drove off. Pt was able to locate sunglasses becaue he had an air tag in them, but he was unable to find med. Pt wondering if its possible to have new rx?  3. PRESCRIBER: \"Who prescribed the medicine?\"   Dr Walsh    Protocols used: Medication Question Call-Adult-    "

## 2024-10-21 DIAGNOSIS — F41.9 ANXIETY: ICD-10-CM

## 2024-10-21 RX ORDER — CLONAZEPAM 0.5 MG/1
0.5 TABLET ORAL 2 TIMES DAILY PRN
Qty: 60 TABLET | Refills: 0 | Status: SHIPPED | OUTPATIENT
Start: 2024-10-21

## 2024-10-21 NOTE — TELEPHONE ENCOUNTER
Per our conversation. I did send him a mychart message letting him know he will need to pay out of pocket or use goodrx coupon.

## 2024-10-29 DIAGNOSIS — G47.00 INSOMNIA, UNSPECIFIED TYPE: ICD-10-CM

## 2024-10-30 NOTE — TELEPHONE ENCOUNTER
1 [id number] 10/21/2024 10/21/2024 clonazePAM (Tablet) 60.0 30 0.5 MG [facility] [place] 0 / 0 [place]    1 [id number] 10/15/2024 10/15/2024 clonazePAM (Tablet) 60.0 30 0.5 MG [facility] Commercial [place] 0 / 0 [place]    1 [id number] 10/03/2024 10/03/2024 Zolpidem Tartrate (Tablet) 30.0 30 10 MG NA [facility] [place] 0 / 0 [place]    1 [id number] 09/16/2024 09/16/2024 clonazePAM (Tablet) 60.0 30 0.5 MG [facility] Commercial Insurance 0 / 0 [place]    1 [id number] 09/03/2024 09/03/2024 Zolpidem Tartrate (Tablet) 30.0 30 10 MG [facility] Commercial Insurance 0 / 0 [place]    1 [id number] 08/16/2024 08/16/2024 clonazePAM (Tablet) 60.0 30 0.5 MG [facility] Commercial Insurance 0 / 0 [place]    1 [id number] 08/05/2024 08/02/2024 Zolpidem Tartrate (Tablet) 30.0 30 10 MG [facility] Commercial Insurance 0 / 0 [place]    1 [id number] 07/18/2024 07/18/2024 clonazePAM (Tablet) 60.0 30 0.5 MG [facility] Commercial Insurance 0 / 0 [place]    1 [id number] 07/02/2024 07/02/2024 Zolpidem Tartrate (Tablet) 30.0 30 10 MG NA [facility] Commercial [place] 0 / 0 [place]    1 [id number] 06/21/2024 06/21/2024 clonazePAM (Tablet) 60.0 30 0.5 MG [facility] Commercial Insurance 0 / 0 P

## 2024-10-31 RX ORDER — ZOLPIDEM TARTRATE 10 MG/1
10 TABLET ORAL
Qty: 30 TABLET | Refills: 0 | Status: SHIPPED | OUTPATIENT
Start: 2024-10-31

## 2024-11-18 DIAGNOSIS — F41.9 ANXIETY: ICD-10-CM

## 2024-11-19 RX ORDER — CLONAZEPAM 0.5 MG/1
0.5 TABLET ORAL 2 TIMES DAILY PRN
Qty: 60 TABLET | Refills: 0 | Status: SHIPPED | OUTPATIENT
Start: 2024-11-19

## 2024-11-27 DIAGNOSIS — G47.00 INSOMNIA, UNSPECIFIED TYPE: ICD-10-CM

## 2024-11-29 RX ORDER — ZOLPIDEM TARTRATE 10 MG/1
10 TABLET ORAL
Qty: 30 TABLET | Refills: 0 | Status: SHIPPED | OUTPATIENT
Start: 2024-11-29

## 2024-11-29 NOTE — TELEPHONE ENCOUNTER
Patient called to request a refill for their ambien advised a refill was requested on 11/27/2024 and is pending approval. Patient verbalized understanding and is in agreement.      Pt is all out of meds and needs it before the wkend

## 2024-12-04 ENCOUNTER — RA CDI HCC (OUTPATIENT)
Dept: OTHER | Facility: HOSPITAL | Age: 29
End: 2024-12-04

## 2024-12-05 ENCOUNTER — OFFICE VISIT (OUTPATIENT)
Dept: FAMILY MEDICINE CLINIC | Facility: CLINIC | Age: 29
End: 2024-12-05
Payer: COMMERCIAL

## 2024-12-05 VITALS
HEIGHT: 70 IN | WEIGHT: 130.4 LBS | OXYGEN SATURATION: 99 % | SYSTOLIC BLOOD PRESSURE: 104 MMHG | HEART RATE: 70 BPM | TEMPERATURE: 97.9 F | BODY MASS INDEX: 18.67 KG/M2 | DIASTOLIC BLOOD PRESSURE: 68 MMHG

## 2024-12-05 DIAGNOSIS — Z00.00 ANNUAL PHYSICAL EXAM: Primary | ICD-10-CM

## 2024-12-05 DIAGNOSIS — Z23 ENCOUNTER FOR IMMUNIZATION: ICD-10-CM

## 2024-12-05 PROCEDURE — 90656 IIV3 VACC NO PRSV 0.5 ML IM: CPT | Performed by: FAMILY MEDICINE

## 2024-12-05 PROCEDURE — 90471 IMMUNIZATION ADMIN: CPT | Performed by: FAMILY MEDICINE

## 2024-12-05 PROCEDURE — 99395 PREV VISIT EST AGE 18-39: CPT | Performed by: FAMILY MEDICINE

## 2024-12-05 NOTE — PROGRESS NOTES
Adult Annual Physical  Name: Tyler Galvin      : 1995      MRN: [id number]  Encounter Provider: Lulu Walsh DO  Encounter Date: 2024   Encounter department: [facility]    Assessment & Plan  Annual physical exam         Encounter for immunization    Orders:    influenza vaccine preservative-free 0.5 mL IM (Fluzone, Afluria, Fluarix, Flulaval)    Immunizations and preventive care screenings were discussed with patient today. Appropriate education was printed on patient's after visit summary.    Counseling:  Exercise: the importance of regular exercise/physical activity was discussed. Recommend exercise 3-5 times per week for at least 30 minutes.       Depression Screening and Follow-up Plan: Patient was screened for depression during today's encounter. They screened negative with a PHQ-2 score of 0.    Tobacco Cessation Counseling: Tobacco cessation counseling was provided. The patient is sincerely urged to quit consumption of tobacco. He is ready to quit tobacco. Medication options and side effects of medication discussed. Patient agreed to medication. Nicotine gum, nicotine nasal spray and nicotine patch was prescribed.         History of Present Illness     Adult Annual Physical:  Patient presents for annual physical. The patient is doing well and is dnies juan luis;aoejw.     Diet and Physical Activity:  - Diet/Nutrition: well balanced diet.  - Exercise: walking.    Depression Screening:  - PHQ-2 Score: 0    General Health:  - Sleep: sleeps well.  - Vision: no vision problems.  - Dental: regular dental visits.     Health:  - History of STDs: no.   - Urinary symptoms: none.     Advanced Care Planning:  - Has an advanced directive?: no    - Has a durable medical POA?: no    - ACP document given to patient?: yes      Review of Systems   Constitutional:  Negative for appetite change, chills and fever.   HENT:  Negative for ear pain, facial swelling, rhinorrhea, sinus  pain, sore throat and trouble swallowing.    Eyes:  Negative for discharge and redness.   Respiratory:  Negative for chest tightness, shortness of breath and wheezing.    Cardiovascular:  Negative for chest pain and palpitations.   Gastrointestinal:  Negative for abdominal pain, diarrhea, nausea and vomiting.   Endocrine: Negative for polyuria.   Genitourinary:  Negative for dysuria and urgency.   Musculoskeletal:  Negative for arthralgias and back pain.   Skin:  Negative for rash.   Neurological:  Negative for dizziness, weakness and headaches.   Hematological:  Negative for adenopathy.   Psychiatric/Behavioral:  Negative for behavioral problems, confusion and sleep disturbance.    All other systems reviewed and are negative.    Pertinent Medical History   Anxiety, insomnia    Objective   There were no vitals taken for this visit.    Physical Exam  Vitals and nursing note reviewed.   Constitutional:       General: He is not in acute distress.     Appearance: Normal appearance. He is well-developed. He is not ill-appearing or diaphoretic.   HENT:      Head: Normocephalic and atraumatic.      Right Ear: Tympanic membrane, ear canal and external ear normal.      Left Ear: Tympanic membrane, ear canal and external ear normal.      Nose: Nose normal. No congestion or rhinorrhea.      Mouth/Throat:      Mouth: Mucous membranes are moist.      Pharynx: Oropharynx is clear. No oropharyngeal exudate or posterior oropharyngeal erythema.   Eyes:      General: No scleral icterus.        Right eye: No discharge.         Left eye: No discharge.      Extraocular Movements: Extraocular movements intact.      Conjunctiva/sclera: Conjunctivae normal.      Pupils: Pupils are equal, round, and reactive to light.   Neck:      Thyroid: No thyromegaly.      Vascular: No carotid bruit or JVD.      Trachea: No tracheal deviation.   Cardiovascular:      Rate and Rhythm: Normal rate and regular rhythm.      Pulses: Normal pulses.      Heart  sounds: Normal heart sounds. No murmur heard.  Pulmonary:      Effort: Pulmonary effort is normal. No respiratory distress.      Breath sounds: Normal breath sounds. No stridor. No wheezing, rhonchi or rales.   Abdominal:      General: Abdomen is flat. Bowel sounds are normal. There is no distension.      Palpations: Abdomen is soft. There is no mass.      Tenderness: There is no abdominal tenderness. There is no guarding or rebound.   Musculoskeletal:         General: No swelling, tenderness or deformity. Normal range of motion.      Cervical back: Normal range of motion and neck supple. No rigidity.      Right lower leg: No edema.      Left lower leg: No edema.   Lymphadenopathy:      Cervical: No cervical adenopathy.   Skin:     General: Skin is warm and dry.      Capillary Refill: Capillary refill takes less than 2 seconds.      Coloration: Skin is not jaundiced.      Findings: No bruising, erythema or rash.   Neurological:      General: No focal deficit present.      Mental Status: He is alert and oriented to person, place, and time.      Cranial Nerves: No cranial nerve deficit.      Sensory: No sensory deficit.      Motor: No abnormal muscle tone.      Coordination: Coordination normal.      Gait: Gait normal.      Deep Tendon Reflexes: Reflexes are normal and symmetric. Reflexes normal.   Psychiatric:         Mood and Affect: Mood normal.         Behavior: Behavior normal.         Thought Content: Thought content normal.         Judgment: Judgment normal.       Administrative Statements   I have spent a total time of 20 minutes in caring for this patient on the day of the visit/encounter including Diagnostic results, Prognosis, Risks and benefits of tx options, Instructions for management, Patient and family education, and Importance of tx compliance. Topics discussed with the patient / family include symptom assessment and management, medication review, medication adjustment, psychosocial support, advanced  directives, and goals of care.

## 2024-12-05 NOTE — PATIENT INSTRUCTIONS
"Patient Education     Routine physical for adults   The Basics   Written by the doctors and editors at [facility]   What is a physical? -- A physical is a routine visit, or \"check-up,\" with your doctor. You might also hear it called a \"wellness visit\" or \"preventive visit.\"  During each visit, the doctor will:   Ask about your physical and mental health   Ask about your habits, behaviors, and lifestyle   Do an exam   Give you vaccines if needed   Talk to you about any medicines you take   Give advice about your health   Answer your questions  Getting regular check-ups is an important part of taking care of your health. It can help your doctor find and treat any problems you have. But it's also important for preventing health problems.  A routine physical is different from a \"sick visit.\" A sick visit is when you see a doctor because of a health concern or problem. Since physicals are scheduled ahead of time, you can think about what you want to ask the doctor.  How often should I get a physical? -- It depends on your age and health. In general, for people age 21 years and older:   If you are younger than 50 years, you might be able to get a physical every 3 years.   If you are 50 years or older, your doctor might recommend a physical every year.  If you have an ongoing health condition, like diabetes or high blood pressure, your doctor will probably want to see you more often.  What happens during a physical? -- In general, each visit will include:   Physical exam - The doctor or nurse will check your height, weight, heart rate, and blood pressure. They will also look at your eyes and ears. They will ask about how you are feeling and whether you have any symptoms that bother you.   Medicines - It's a good idea to bring a list of all the medicines you take to each doctor visit. Your doctor will talk to you about your medicines and answer any questions. Tell them if you are having any side effects that bother you. You " "should also tell them if you are having trouble paying for any of your medicines.   Habits and behaviors - This includes:   Your diet   Your exercise habits   Whether you smoke, drink alcohol, or use drugs   Whether you are sexually active   Whether you feel safe at home  Your doctor will talk to you about things you can do to improve your health and lower your risk of health problems. They will also offer help and support. For example, if you want to quit smoking, they can give you advice and might prescribe medicines. If you want to improve your diet or get more physical activity, they can help you with this, too.   Lab tests, if needed - The tests you get will depend on your age and situation. For example, your doctor might want to check your:   Cholesterol   Blood sugar   Iron level   Vaccines - The recommended vaccines will depend on your age, health, and what vaccines you already had. Vaccines are very important because they can prevent certain serious or deadly infections.   Discussion of screening - \"Screening\" means checking for diseases or other health problems before they cause symptoms. Your doctor can recommend screening based on your age, risk, and preferences. This might include tests to check for:   Cancer, such as breast, prostate, cervical, ovarian, colorectal, prostate, lung, or skin cancer   Sexually transmitted infections, such as chlamydia and gonorrhea   Mental health conditions like depression and anxiety  Your doctor will talk to you about the different types of screening tests. They can help you decide which screenings to have. They can also explain what the results might mean.   Answering questions - The physical is a good time to ask the doctor or nurse questions about your health. If needed, they can refer you to other doctors or specialists, too.  Adults older than 65 years often need other care, too. As you get older, your doctor will talk to you about:   How to prevent falling at " home   Hearing or vision tests   Memory testing   How to take your medicines safely   Making sure that you have the help and support you need at home  All topics are updated as new evidence becomes available and our peer review process is complete.  This topic retrieved from [employer] on: May 02, 2024.  Topic 383788 Version 1.0  Release: 32.4.3 - C32.122  © 2024 UpToDate, Inc. and/or its affiliates. All rights reserved.  Consumer Information Use and Disclaimer   Disclaimer: This generalized information is a limited summary of diagnosis, treatment, and/or medication information. It is not meant to be comprehensive and should be used as a tool to help the user understand and/or assess potential diagnostic and treatment options. It does NOT include all information about conditions, treatments, medications, side effects, or risks that may apply to a specific patient. It is not intended to be medical advice or a substitute for the medical advice, diagnosis, or treatment of a health care provider based on the health care provider's examination and assessment of a patient's specific and unique circumstances. Patients must speak with a health care provider for complete information about their health, medical questions, and treatment options, including any risks or benefits regarding use of medications. This information does not endorse any treatments or medications as safe, effective, or approved for treating a specific patient. UpToDate, Inc. and its affiliates disclaim any warranty or liability relating to this information or the use thereof.The use of this information is governed by the Terms of Use, available at https://www.[employer]Sourceuwer.com/en/know/clinical-effectiveness-terms. 2024© UpToDate, Inc. and its affiliates and/or licensors. All rights reserved.  Copyright   © 2024 UpToDate, Inc. and/or its affiliates. All rights reserved.

## 2024-12-16 DIAGNOSIS — F41.9 ANXIETY: ICD-10-CM

## 2024-12-16 RX ORDER — CLONAZEPAM 0.5 MG/1
0.5 TABLET ORAL 2 TIMES DAILY PRN
Qty: 60 TABLET | Refills: 0 | Status: SHIPPED | OUTPATIENT
Start: 2024-12-16

## 2024-12-16 NOTE — TELEPHONE ENCOUNTER
1 [id number] 11/29/2024 11/29/2024 Zolpidem Tartrate (Tablet) 30.0 30 10 MG [facility] Commercial Insurance 0 / 0 [place]    1 [id number] 11/19/2024 11/19/2024 clonazePAM (Tablet) 60.0 30 0.5 MG [facility] Commercial Insurance 0 / 0 [place]    1 [id number] 10/31/2024 10/31/2024 Zolpidem Tartrate (Tablet) 30.0 30 10 MG [facility] Commercial Insurance 0 / 0 [place]    1 [id number] 10/21/2024 10/21/2024 clonazePAM (Tablet) 60.0 30 0.5 MG [facility] Commercial Insurance 0 / 0 [place]    1 [id number] 10/15/2024 10/15/2024 clonazePAM (Tablet) 60.0 30 0.5 MG [facility] Commercial Insurance 0 / 0 [place]    1 [id number] 10/03/2024 10/03/2024 Zolpidem Tartrate (Tablet) 30.0 30 10 MG [facility] Commercial Insurance 0 / 0 [place]    1 [id number] 09/16/2024 09/16/2024 clonazePAM (Tablet) 60.0 30 0.5 MG [facility] Commercial Insurance 0 / 0 [place]    1 [id number] 09/03/2024 09/03/2024 Zolpidem Tartrate (Tablet) 30.0 30 10 MG [facility] Commercial Insurance 0 / 0 [place]    1 [id number] 08/16/2024 08/16/2024 clonazePAM (Tablet) 60.0 30 0.5 MG [facility] Commercial Insurance 0 / 0 [place]    1 [id number] 08/05/2024 08/02/2024 Zolpidem Tartrate (Tablet) 30.0 30 10 MG [facility] Commercial Insurance 0 / 0 [place]

## 2024-12-23 DIAGNOSIS — G47.00 INSOMNIA, UNSPECIFIED TYPE: ICD-10-CM

## 2024-12-24 RX ORDER — ZOLPIDEM TARTRATE 10 MG/1
10 TABLET ORAL
Qty: 30 TABLET | Refills: 0 | Status: SHIPPED | OUTPATIENT
Start: 2024-12-24

## 2024-12-24 NOTE — TELEPHONE ENCOUNTER
1 [id number] 12/18/2024 12/16/2024 clonazePAM (Tablet) 60.0 [address] [facility] Commercial Insurance 0 / 0 [place]    1 [id number] 11/29/2024 11/29/2024 Zolpidem Tartrate (Tablet) 30.0 [address] [facility] Commercial Insurance 0 / 0 [place]    1 [id number] 11/19/2024 11/19/2024 clonazePAM (Tablet) 60.0 [address] [facility] Commercial Insurance 0 / 0 [place]    1 [id number] 10/31/2024 10/31/2024 Zolpidem Tartrate (Tablet) 30.0 [address] [facility] Commercial Insurance 0 / 0 [place]    1 [id number] 10/21/2024 10/21/2024 clonazePAM (Tablet) 60.0 [address] [facility] Commercial Insurance 0 / 0 [place]    1 [id number] 10/15/2024 10/15/2024 clonazePAM (Tablet) 60.0 [address] [facility] Commercial Insurance 0 / 0 [place]    1 [id number] 10/03/2024 10/03/2024 Zolpidem Tartrate (Tablet) 30.0 [address] [facility] Commercial Insurance 0 / 0 [place]    1 [id number] 09/16/2024 09/16/2024 clonazePAM (Tablet) 60.0 [address] [facility] Commercial Insurance 0 / 0 [place]    1 [id number] 09/03/2024 09/03/2024 Zolpidem Tartrate (Tablet) 30.0 [address] [facility] Commercial Insurance 0 / 0 [place]    1 [id number] 08/16/2024 08/16/2024 clonazePAM (Tablet) 60.0 [address] [facility] Commercial Insurance 0 / 0 [place]

## 2025-01-14 DIAGNOSIS — F41.9 ANXIETY: ICD-10-CM

## 2025-01-14 NOTE — TELEPHONE ENCOUNTER
1 [id number] 12/26/2024 12/24/2024 Zolpidem Tartrate (Tablet) 30.0 30 10 MG NA ALEXANDER FERNÁNDEZ POGODZINSKI [facility] Commercial Insurance 0 / 0 [place]    1 [id number] 12/18/2024 12/16/2024 clonazePAM (Tablet) 60.0 30 0.5 MG NA [facility] Commercial Insurance 0 / 0 [place]    1 [id number] 11/29/2024 11/29/2024 Zolpidem Tartrate (Tablet) 30.0 30 10 MG NA [facility] Commercial Insurance 0 / 0 [place]    1 [id number] 11/19/2024 11/19/2024 clonazePAM (Tablet) 60.0 30 0.5 MG NA [facility] Commercial Insurance 0 / 0 [place]    1 [id number] 10/31/2024 10/31/2024 Zolpidem Tartrate (Tablet) 30.0 30 10 MG NA [facility] Commercial Insurance 0 / 0 [place]    1 [id number] 10/21/2024 10/21/2024 clonazePAM (Tablet) 60.0 30 0.5 MG [facility] Commercial Insurance 0 / 0 [place]    1 [id number] 10/15/2024 10/15/2024 clonazePAM (Tablet) 60.0 30 0.5 MG [facility] Commercial Insurance 0 / 0 [place]    1 [id number] 10/03/2024 10/03/2024 Zolpidem Tartrate (Tablet) 30.0 30 10 MG NA [facility] Commercial Insurance 0 / 0 [place]    1 [id number] 09/16/2024 09/16/2024 clonazePAM (Tablet) 60.0 30 0.5 MG [facility] 0 / 0 [place]    1 [id number] 09/03/2024 09/03/2024 Zolpidem Tartrate (Tablet) 30.0 30 10 MG NA SHALINI BROADT [facility] Commercial Insurance 0 / 0 [place]

## 2025-01-15 RX ORDER — CLONAZEPAM 0.5 MG/1
0.5 TABLET ORAL 2 TIMES DAILY PRN
Qty: 60 TABLET | Refills: 0 | Status: SHIPPED | OUTPATIENT
Start: 2025-01-15

## 2025-01-21 DIAGNOSIS — G47.00 INSOMNIA, UNSPECIFIED TYPE: ICD-10-CM

## 2025-01-21 RX ORDER — ZOLPIDEM TARTRATE 10 MG/1
10 TABLET ORAL
Qty: 30 TABLET | Refills: 0 | Status: SHIPPED | OUTPATIENT
Start: 2025-01-21

## 2025-01-21 NOTE — TELEPHONE ENCOUNTER
1 [id number] 01/15/2025 01/15/2025 clonazePAM (Tablet) 60.0 30 0.5 MG NA [facility] Commercial Insurance 0 / 0 [place]    1 [id number] 12/26/2024 12/24/2024 Zolpidem Tartrate (Tablet) 30.0 30 10 MG NA ALEXANDER FERNÁNDEZ POGODZINSKI [facility] Commercial Insurance 0 / 0 [place]    1 [id number] 12/18/2024 12/16/2024 clonazePAM (Tablet) 60.0 30 0.5 MG NA [facility] Commercial Insurance 0 / 0 [place]    1 [id number] 11/29/2024 11/29/2024 Zolpidem Tartrate (Tablet) 30.0 30 10 MG [facility] Commercial Insurance 0 / 0 [place]    1 [id number] 11/19/2024 11/19/2024 clonazePAM (Tablet) 60.0 30 0.5 MG [facility] Commercial Insurance 0 / 0 [place]    1 [id number] 10/31/2024 10/31/2024 Zolpidem Tartrate (Tablet) 30.0 30 10 MG [facility] Commercial Insurance 0 / 0 [place]    1 [id number] 10/21/2024 10/21/2024 clonazePAM (Tablet) 60.0 30 0.5 MG [facility] Commercial Insurance 0 / 0 [place]    1 [id number] 10/15/2024 10/15/2024 clonazePAM (Tablet) 60.0 30 0.5 MG NA [facility] Commercial Insurance 0 / 0 [place]    1 [id number] 10/03/2024 10/03/2024 Zolpidem Tartrate (Tablet) 30.0 30 10 MG [facility] Commercial Insurance 0 / 0 [place]    1 [id number] 09/16/2024 09/16/2024 clonazePAM (Tablet) 60.0 30 0.5 MG [facility] Commercial Insurance 0 / 0

## 2025-02-12 DIAGNOSIS — F41.9 ANXIETY: ICD-10-CM

## 2025-02-12 RX ORDER — CLONAZEPAM 0.5 MG/1
0.5 TABLET ORAL 2 TIMES DAILY PRN
Qty: 60 TABLET | Refills: 0 | Status: SHIPPED | OUTPATIENT
Start: 2025-02-12

## 2025-02-12 NOTE — TELEPHONE ENCOUNTER
1 [id number] 01/23/2025 01/21/2025 Zolpidem Tartrate (Tablet) 30.0 30 10 MG NA [facility] Commercial Insurance 0 / 0 [place]    1 [id number] 01/15/2025 01/15/2025 clonazePAM (Tablet) 60.0 30 0.5 MG NA [facility] Commercial Insurance 0 / 0 [place]    1 [id number] 12/26/2024 12/24/2024 Zolpidem Tartrate (Tablet) 30.0 30 10 MG NA ALEXANDER FERNÁNDEZ POGODZINSKI [facility] Commercial Insurance 0 / 0 [place]    1 [id number] 12/18/2024 12/16/2024 clonazePAM (Tablet) 60.0 30 0.5 MG NA [facility] Commercial Insurance 0 / 0 [place]    1 [id number] 11/29/2024 11/29/2024 Zolpidem Tartrate (Tablet) 30.0 30 10 MG NA [facility] Commercial Insurance 0 / 0 [place]    1 [id number] 11/19/2024 11/19/2024 clonazePAM (Tablet) 60.0 30 0.5 MG NA [facility] Commercial Insurance 0 / 0 [place]    1 [id number] 10/31/2024 10/31/2024 Zolpidem Tartrate (Tablet) 30.0 30 10 MG NA [facility] Commercial Insurance 0 / 0 [place]    1 [id number] 10/21/2024 10/21/2024 clonazePAM (Tablet) 60.0 30 0.5 MG NA [facility] Commercial Insurance 0 / 0 [place]    1 [id number] 10/15/2024 10/15/2024 clonazePAM (Tablet) 60.0 30 0.5 MG [facility] Commercial [facility] 0 / 0 [place]    1 [id number] 10/03/2024 10/03/2024 Zolpidem Tartrate (Tablet) 30.0 30 10 MG NA SHALINI BROADT [facility] Commercial Insurance 0 / 0 [place]

## 2025-02-18 DIAGNOSIS — G47.00 INSOMNIA, UNSPECIFIED TYPE: ICD-10-CM

## 2025-02-18 NOTE — TELEPHONE ENCOUNTER
1 [id number] 02/12/2025 02/12/2025 clonazePAM (Tablet) 60.0 30 0.5 MG NA [facility] Commercial Insurance 0 / 0 [place]    1 [id number] 01/23/2025 01/21/2025 Zolpidem Tartrate (Tablet) 30.0 30 10 MG NA [facility] Commercial Insurance 0 / 0 [place]    1 [id number] 01/15/2025 01/15/2025 clonazePAM (Tablet) 60.0 30 0.5 MG NA [facility] Commercial Insurance 0 / 0 [place]    1 [id number] 12/26/2024 12/24/2024 Zolpidem Tartrate (Tablet) 30.0 30 10 MG NA ALEXANDER FERNÁNDEZ POGODZINSKI [facility] Commercial Insurance 0 / 0 [place]    1 [id number] 12/18/2024 12/16/2024 clonazePAM (Tablet) 60.0 30 0.5 MG NA [facility] Commercial Insurance 0 / 0 [place]    1 [id number] 11/29/2024 11/29/2024 Zolpidem Tartrate (Tablet) 30.0 30 10 MG [facility] Commercial Insurance 0 / 0 [place]    1 [id number] 11/19/2024 11/19/2024 clonazePAM (Tablet) 60.0 30 0.5 MG [facility] Commercial Insurance 0 / 0 [place]    1 [id number] 10/31/2024 10/31/2024 Zolpidem Tartrate (Tablet) 30.0 30 10 MG [facility] Commercial [facility] 0 / 0 [place]    1 [id number] 10/21/2024 10/21/2024 clonazePAM (Tablet) 60.0 30 0.5 MG NA [facility] Commercial Insurance 0 / 0 [place]    1 [id number] 10/15/2024 10/15/2024 clonazePAM (Tablet) 60.0 30 0.5 MG NA [facility] Commercial Insurance 0 / 0 [place]

## 2025-02-19 RX ORDER — ZOLPIDEM TARTRATE 10 MG/1
10 TABLET ORAL
Qty: 30 TABLET | Refills: 0 | Status: SHIPPED | OUTPATIENT
Start: 2025-02-19

## 2025-03-12 DIAGNOSIS — F41.9 ANXIETY: ICD-10-CM

## 2025-03-12 NOTE — TELEPHONE ENCOUNTER
1 [id number] 02/19/2025 02/19/2025 Zolpidem Tartrate (Tablet) 30.0 30 10 MG NA ARIES NEVILLE [facility] Commercial Insurance 0 / 0 [place]    1 [id number] 02/12/2025 02/12/2025 clonazePAM (Tablet) 60.0 30 0.5 MG NA [facility] Commercial Insurance 0 / 0 [place]    1 [id number] 01/23/2025 01/21/2025 Zolpidem Tartrate (Tablet) 30.0 30 10 MG NA [facility] Commercial Insurance 0 / 0 [place]    1 [id number] 01/15/2025 01/15/2025 clonazePAM (Tablet) 60.0 30 0.5 MG NA [facility] Commercial Insurance 0 / 0 [place]    1 [id number] 12/26/2024 12/24/2024 Zolpidem Tartrate (Tablet) 30.0 30 10 MG NA ALEXANDER FERNÁNDEZ POGODZINSKI [facility] Commercial Insurance 0 / 0 [place]    1 [id number] 12/18/2024 12/16/2024 clonazePAM (Tablet) 60.0 30 0.5 MG NA [facility] Commercial Insurance 0 / 0 [place]    1 [id number] 11/29/2024 11/29/2024 Zolpidem Tartrate (Tablet) 30.0 30 10 MG NA [facility] Commercial Insurance 0 / 0 [place]    1 [id number] 11/19/2024 11/19/2024 clonazePAM (Tablet) 60.0 30 0.5 MG NA [facility] Commercial Insurance 0 / 0 [place]    1 [id number] 10/31/2024 10/31/2024 Zolpidem Tartrate (Tablet) 30.0 30 10 MG NA [facility] Commercial Insurance 0 / 0 [place]    1 [id number] 10/21/2024 10/21/2024 clonazePAM (Tablet) 60.0 30 0.5 MG NA [facility] Commercial Insurance 0 / 0 [place]

## 2025-03-13 RX ORDER — CLONAZEPAM 0.5 MG/1
0.5 TABLET ORAL 2 TIMES DAILY PRN
Qty: 60 TABLET | Refills: 0 | Status: SHIPPED | OUTPATIENT
Start: 2025-03-13

## 2025-03-20 DIAGNOSIS — G47.00 INSOMNIA, UNSPECIFIED TYPE: ICD-10-CM

## 2025-03-21 RX ORDER — ZOLPIDEM TARTRATE 10 MG/1
10 TABLET ORAL
Qty: 30 TABLET | Refills: 0 | Status: SHIPPED | OUTPATIENT
Start: 2025-03-21

## 2025-03-21 NOTE — TELEPHONE ENCOUNTER
1 [id number] 03/13/2025 03/13/2025 clonazePAM (Tablet) 60.0 30 0.5 MG NA [facility] Commercial Insurance 0 / 0 [place]    1 [id number] 02/19/2025 02/19/2025 Zolpidem Tartrate (Tablet) 30.0 30 10 MG NA ARIES NEVILLE [facility] Commercial Insurance 0 / 0 [place]    1 [id number] 02/12/2025 02/12/2025 clonazePAM (Tablet) 60.0 30 0.5 MG NA [facility] Commercial Insurance 0 / 0 [place]    1 [id number] 01/23/2025 01/21/2025 Zolpidem Tartrate (Tablet) 30.0 30 10 MG [facility] Commercial Insurance 0 / 0 [place]    1 [id number] 01/15/2025 01/15/2025 clonazePAM (Tablet) 60.0 30 0.5 MG NA [facility] Commercial Insurance 0 / 0 [place]    1 [id number] 12/26/2024 12/24/2024 Zolpidem Tartrate (Tablet) 30.0 30 10 MG NA ALEXANDER FERNÁNDEZ POGODZINSKI [facility] Commercial Insurance 0 / 0 [place]    1 [id number] 12/18/2024 12/16/2024 clonazePAM (Tablet) 60.0 30 0.5 MG [facility] Commercial Insurance 0 / 0 [place]    1 [id number] 11/29/2024 11/29/2024 Zolpidem Tartrate (Tablet) 30.0 30 10 MG NA [facility] Commercial Insurance 0 / 0 [place]    1 [id number] 11/19/2024 11/19/2024 clonazePAM (Tablet) 60.0 30 0.5 MG [facility] Commercial Insurance 0 / 0 [place]    1 [id number] 10/31/2024 10/31/2024 Zolpidem Tartrate (Tablet) 30.0 30 10 MG NA [facility] Commercial Insurance 0 / 0 [place]

## 2025-04-09 DIAGNOSIS — F41.9 ANXIETY: ICD-10-CM

## 2025-04-10 RX ORDER — CLONAZEPAM 0.5 MG/1
0.5 TABLET ORAL 2 TIMES DAILY PRN
Qty: 60 TABLET | Refills: 0 | Status: SHIPPED | OUTPATIENT
Start: 2025-04-10

## 2025-04-10 NOTE — TELEPHONE ENCOUNTER
1 [id number] 03/21/2025 03/21/2025 Zolpidem Tartrate (Tablet) 30.0 30 10 MG NA [facility] Commercial Insurance 0 / 0 [place]    1 [id number] 03/13/2025 03/13/2025 clonazePAM (Tablet) 60.0 30 0.5 MG NA [facility] Commercial Insurance 0 / 0 [place]    1 [id number] 02/19/2025 02/19/2025 Zolpidem Tartrate (Tablet) 30.0 30 10 MG NA [facility] Commercial Insurance 0 / 0 [place]    1 [id number] 02/12/2025 02/12/2025 clonazePAM (Tablet) 60.0 30 0.5 MG NA [facility] Commercial Insurance 0 / 0 [place]    1 [id number] 01/23/2025 01/21/2025 Zolpidem Tartrate (Tablet) 30.0 30 10 MG [facility] Commercial Insurance 0 / 0 [place]    1 [id number] 01/15/2025 01/15/2025 clonazePAM (Tablet) 60.0 30 0.5 MG NA [facility] Commercial Insurance 0 / 0 [place]    1 [id number] 12/26/2024 12/24/2024 Zolpidem Tartrate (Tablet) 30.0 30 10 MG NA ALEXANDER FERNÁNDEZ POGODZINSKI [facility] Commercial Insurance 0 / 0 [place]    1 [id number] 12/18/2024 12/16/2024 clonazePAM (Tablet) 60.0 30 0.5 MG NA [facility] Commercial Insurance 0 / 0 [place]    1 [id number] 11/29/2024 11/29/2024 Zolpidem Tartrate (Tablet) 30.0 30 [address] MG NA [facility] Commercial Insurance 0 / 0 [place]    1 [id number] 11/19/2024 11/19/2024 clonazePAM (Tablet) 60.0 30 0.5 MG [facility] Commercial Insurance

## 2025-04-16 DIAGNOSIS — G47.00 INSOMNIA, UNSPECIFIED TYPE: ICD-10-CM

## 2025-04-17 ENCOUNTER — TELEPHONE (OUTPATIENT)
Age: 30
End: 2025-04-17

## 2025-04-17 RX ORDER — ZOLPIDEM TARTRATE 10 MG/1
10 TABLET ORAL
Qty: 30 TABLET | Refills: 0 | Status: SHIPPED | OUTPATIENT
Start: 2025-04-17

## 2025-04-17 NOTE — TELEPHONE ENCOUNTER
Pt. Wanted to know if the doctor could send his zolpidem over to the pharmacy today. He moved about 30 mins away and is in [place] today and was hoping to pick it up.  Is that possible

## 2025-04-17 NOTE — TELEPHONE ENCOUNTER
Sent to provider. She will sign when time allows, I will try to have her do this. She has been with patients back to back and has not got to messages just yet.

## 2025-04-17 NOTE — TELEPHONE ENCOUNTER
1 [id number] 04/10/2025 04/10/2025 clonazePAM (Tablet) 60.0 30 0.5 MG NA [facility] Commercial Insurance 0 / 0 [place]    1 [id number] 03/21/2025 03/21/2025 Zolpidem Tartrate (Tablet) 30.0 30 10 MG NA [facility] Commercial Insurance 0 / 0 [place]    1 [id number] 03/13/2025 03/13/2025 clonazePAM (Tablet) 60.0 30 0.5 MG NA [facility] Commercial Insurance 0 / 0 [place]    1 [id number] 02/19/2025 02/19/2025 Zolpidem Tartrate (Tablet) 30.0 30 10 MG NA [facility] Commercial Insurance 0 / 0 [place]    1 [id number] 02/12/2025 02/12/2025 clonazePAM (Tablet) 60.0 30 0.5 MG NA [facility] Commercial Insurance 0 / 0 [place]    1 [id number] 01/23/2025 01/21/2025 Zolpidem Tartrate (Tablet) 30.0 30 10 MG [facility] Commercial Insurance 0 / 0 [place]    1 [id number] 01/15/2025 01/15/2025 clonazePAM (Tablet) 60.0 30 0.5 MG NA [facility] Commercial Insurance 0 / 0 [place]    1 [id number] 12/26/2024 12/24/2024 Zolpidem Tartrate (Tablet) 30.0 30 10 MG NA ALEXANDER FERNÁNDEZ POGODZINSKI [facility] Commercial Insurance 0 / 0 [place]    1 [id number] 12/18/2024 12/16/2024 clonazePAM (Tablet) 60.0 30 0.5 MG NA [facility] Commercial Insurance 0 / 0 [place]    1 [id number] 11/29/2024 11/29/2024 Zolpidem Tartrate (Tablet) 30.0 30 10 MG [facility] Commercial Insurance 0 / 0

## 2025-05-13 DIAGNOSIS — G47.00 INSOMNIA, UNSPECIFIED TYPE: ICD-10-CM

## 2025-05-13 RX ORDER — ZOLPIDEM TARTRATE 10 MG/1
10 TABLET ORAL
Qty: 30 TABLET | Refills: 0 | Status: SHIPPED | OUTPATIENT
Start: 2025-05-13

## 2025-05-13 NOTE — TELEPHONE ENCOUNTER
1 [id number] ** 04/17/2025 04/17/2025 Zolpidem Tartrate (Tablet) 30.0 30 10 MG NA [facility] Commercial Insurance 0 / 0 [place]    1 [id number] ** 04/10/2025 04/10/2025 clonazePAM (Tablet) 60.0 30 0.5 MG NA [facility] Commercial Insurance 0 / 0 [place]    1 [id number] ** 03/21/2025 03/21/2025 Zolpidem Tartrate (Tablet) 30.0 30 10 MG NA [facility] Commercial Insurance 0 / 0 [place]    1 [id number] ** 03/13/2025 03/13/2025 clonazePAM (Tablet) 60.0 30 0.5 MG NA [facility] Commercial Insurance 0 / 0 [place]    1 [id number] ** 02/19/2025 02/19/2025 Zolpidem Tartrate (Tablet) 30.0 30 10 MG NA [facility] Commercial Insurance 0 / 0 [place]    1 [id number] ** 02/12/2025 02/12/2025 clonazePAM (Tablet) 60.0 30 0.5 MG NA [facility] Commercial Insurance 0 / 0 [place]    1 [id number] ** 01/23/2025 01/21/2025 Zolpidem Tartrate (Tablet) 30.0 30 10 MG NA [facility] Commercial Insurance 0 / 0 [place]    1 [id number] ** 01/15/2025 01/15/2025 clonazePAM (Tablet) 60.0 30 0.5 MG NA [facility] Commercial Insurance 0 / 0 [place]    1 [id number] ** 12/26/2024 12/24/2024 Zolpidem Tartrate (Tablet) 30.0 30 10 MG NA ALEXANDER FERNÁNDEZ POGODZINSKI [facility] Commercial Insurance 0 / 0 [place]    1 [id number] ** 12/18/2024 12/16/2024 clonazePAM (Tablet) 60.0 30 0.5 MG NA SHALINI VILLEGAS [facility] Commercial Insurance 0 / 0 [place]

## 2025-05-21 DIAGNOSIS — F41.9 ANXIETY: ICD-10-CM

## 2025-05-21 RX ORDER — CLONAZEPAM 0.5 MG/1
0.5 TABLET ORAL 2 TIMES DAILY PRN
Qty: 60 TABLET | Refills: 0 | Status: SHIPPED | OUTPATIENT
Start: 2025-05-21

## 2025-05-21 NOTE — TELEPHONE ENCOUNTER
1 [id number] ** 05/14/2025 05/13/2025 Zolpidem Tartrate (Tablet) 30.0 30 10 MG NA [facility] Commercial Insurance 0 / 0 [place]    1 [id number] ** 04/17/2025 04/17/2025 Zolpidem Tartrate (Tablet) 30.0 30 10 MG NA [facility] Commercial Insurance 0 / 0 [place]    1 [id number] ** 04/10/2025 04/10/2025 clonazePAM (Tablet) 60.0 30 0.5 MG NA [facility] Commercial Insurance 0 / 0 [place]    1 [id number] ** 03/21/2025 03/21/2025 Zolpidem Tartrate (Tablet) 30.0 30 10 MG NA [facility] Commercial Insurance 0 / 0 [place]    1 [id number] ** 03/13/2025 03/13/2025 clonazePAM (Tablet) 60.0 30 0.5 MG [facility] Commercial Insurance 0 / 0 [place]    1 [id number] ** 02/19/2025 02/19/2025 Zolpidem Tartrate (Tablet) 30.0 30 10 MG NA [facility] Commercial Insurance 0 / 0 PA    1 [id number] ** 02/12/2025 02/12/2025 clonazePAM (Tablet) 60.0 30 0.5 MG [facility] Commercial Insurance 0 / 0 [place]    1 [id number] ** 01/23/2025 01/21/2025 Zolpidem Tartrate (Tablet) 30.0 30 10 MG NA [facility] Commercial Insurance 0 / 0 [place]    1 [id number] ** 01/15/2025 01/15/2025 clonazePAM (Tablet) 60.0 30 0.5 MG NA [facility] Commercial Insurance 0 / 0 [place]    1 [id number] ** 12/26/2024 12/24/2024 Zolpidem Tartrate (Tablet) 30.0 30 10 MG NA ALEXANDER FERNÁNDEZ POGODZINSKI [facility] Commercial Insurance

## 2025-06-06 ENCOUNTER — APPOINTMENT (OUTPATIENT)
Dept: RADIOLOGY | Facility: CLINIC | Age: 30
End: 2025-06-06
Payer: COMMERCIAL

## 2025-06-06 ENCOUNTER — OFFICE VISIT (OUTPATIENT)
Dept: URGENT CARE | Facility: CLINIC | Age: 30
End: 2025-06-06
Payer: COMMERCIAL

## 2025-06-06 VITALS
WEIGHT: 135 LBS | HEART RATE: 77 BPM | RESPIRATION RATE: 18 BRPM | BODY MASS INDEX: 19.37 KG/M2 | SYSTOLIC BLOOD PRESSURE: 126 MMHG | OXYGEN SATURATION: 99 % | DIASTOLIC BLOOD PRESSURE: 84 MMHG | TEMPERATURE: 97.7 F

## 2025-06-06 DIAGNOSIS — M79.672 LEFT FOOT PAIN: Primary | ICD-10-CM

## 2025-06-06 DIAGNOSIS — M79.672 LEFT FOOT PAIN: ICD-10-CM

## 2025-06-06 PROCEDURE — G0382 LEV 3 HOSP TYPE B ED VISIT: HCPCS

## 2025-06-06 PROCEDURE — S9083 URGENT CARE CENTER GLOBAL: HCPCS

## 2025-06-06 PROCEDURE — 73630 X-RAY EXAM OF FOOT: CPT

## 2025-06-06 NOTE — PATIENT INSTRUCTIONS
Your x-rays were read by the provider. A radiologist will also read the x-rays and you will be notified of any abnormalities.     Roll frozen water bottle along bottom of foot.    Dr. Galindo shoe inserts.    Tylenol / Motrin / Advil for pain relief.    Follow-up with podiatry - referral placed.    Go to the ED for any severely worsening symptoms.

## 2025-06-06 NOTE — PROGRESS NOTES
[facility]        NAME: Tyler Galvin is a 30 y.o. male  : 1995    MRN: [id number]  DATE: 2025  TIME: 3:31 PM    Assessment and Plan   Left foot pain [M79.672]  1. Left foot pain  XR foot 3+ vw left    Ambulatory Referral to Podiatry            Patient Instructions     Your x-rays were read by the provider. A radiologist will also read the x-rays and you will be notified of any abnormalities.     Roll frozen water bottle along bottom of foot.    Dr. Galindo shoe inserts.    Tylenol / Motrin / Advil for pain relief.    Follow-up with podiatry - referral placed.    Go to the ED for any severely worsening symptoms.    If tests are performed, our office will contact you with results only if changes need to made to the care plan discussed with you at the visit. [facility]      Chief Complaint     Chief Complaint   Patient presents with    Foot Pain     Started yesterday with pain to the bottom of left foot, denies specific injury, pain with all movement, reports mild swelling, denies bruising          History of Present Illness       30-year-old male presenting with left foot pain that started yesterday. Denies known injury of incident prior to symptom onset. Reports a sharp pain over the ball of his foot that worsens with weight bearing and while walking. Mild swelling, no bruising.         Review of Systems   Review of Systems   Musculoskeletal:         Left foot pain         Current Medications     Current Medications[1]    Current Allergies     Allergies as of 2025    (No Known Allergies)            The following portions of the patient's history were reviewed and updated as appropriate: allergies, current medications, past family history, past medical history, past social history, past surgical history and problem list.     Past Medical History[2]    Past Surgical History[3]    Family History[4]      Medications have been  verified.        Objective   /84 (BP Location: Right arm, Patient Position: Sitting)   Pulse 77   Temp 97.7 °F (36.5 °C) (Tympanic)   Resp 18   Wt 61.2 kg (135 lb)   SpO2 99%   BMI 19.37 kg/m²        Physical Exam     Physical Exam  Vitals reviewed.   Constitutional:       General: He is not in acute distress.  HENT:      Head: Normocephalic and atraumatic.      Mouth/Throat:      Mouth: Mucous membranes are moist.     Cardiovascular:      Rate and Rhythm: Normal rate.      Pulses:           Dorsalis pedis pulses are 2+ on the left side.        Posterior tibial pulses are 2+ on the left side.   Pulmonary:      Effort: Pulmonary effort is normal.     Musculoskeletal:         General: Normal range of motion.      Cervical back: Normal range of motion and neck supple.        Feet:    Feet:      Left foot:      Skin integrity: Skin integrity normal.     Skin:     General: Skin is warm and dry.      Capillary Refill: Capillary refill takes less than 2 seconds.     Neurological:      Mental Status: He is alert and oriented to person, place, and time.                        [1]   Current Outpatient Medications:     cetirizine (ZyrTEC) 10 mg tablet, take 1 tablet by mouth twice a day, Disp: 180 tablet, Rfl: 0    clonazePAM (KlonoPIN) 0.5 mg tablet, Take 1 tablet (0.5 mg total) by mouth 2 (two) times a day as needed for seizures, Disp: 60 tablet, Rfl: 0    rizatriptan (MAXALT) 10 mg tablet, Take 1 tablet (10 mg total) by mouth once as needed for migraine for up to 1 dose may repeat in 2 hours if necessary, Disp: 10 tablet, Rfl: 0    zolpidem (AMBIEN) 10 mg tablet, Take 1 tablet (10 mg total) by mouth daily at bedtime as needed for sleep, Disp: 30 tablet, Rfl: 0  [2]   Past Medical History:  Diagnosis Date    Anxiety     Bloated abdomen     GERD (gastroesophageal reflux disease)    [3]   Past Surgical History:  Procedure Laterality Date    SKIN BIOPSY      UPPER GASTROINTESTINAL ENDOSCOPY      WISDOM TOOTH  EXTRACTION     [4]   Family History  Problem Relation Name Age of Onset    Diabetes Mother      Diabetes Maternal Grandmother      Melanoma Maternal Grandfather

## 2025-06-08 ENCOUNTER — RESULTS FOLLOW-UP (OUTPATIENT)
Dept: URGENT CARE | Facility: CLINIC | Age: 30
End: 2025-06-08

## 2025-06-09 DIAGNOSIS — G47.00 INSOMNIA, UNSPECIFIED TYPE: ICD-10-CM

## 2025-06-09 RX ORDER — ZOLPIDEM TARTRATE 10 MG/1
10 TABLET ORAL
Qty: 30 TABLET | Refills: 0 | Status: SHIPPED | OUTPATIENT
Start: 2025-06-09

## 2025-06-09 NOTE — TELEPHONE ENCOUNTER
1 [id number] ** 05/21/2025 05/21/2025 clonazePAM (Tablet) 60.0 30 0.5 MG NA [facility] Commercial Insurance 0 / 0 [place]    1 [id number] ** 05/14/2025 05/13/2025 Zolpidem Tartrate (Tablet) 30.0 30 10 MG NA [facility] Commercial Insurance 0 / 0 [place]    1 [id number] ** 04/17/2025 04/17/2025 Zolpidem Tartrate (Tablet) 30.0 30 10 MG NA [facility] Commercial Insurance 0 / 0 [place]    1 [id number] ** 04/10/2025 04/10/2025 clonazePAM (Tablet) 60.0 30 0.5 MG NA [facility] Commercial Insurance 0 / 0 [place]    1 [id number] ** 03/21/2025 03/21/2025 Zolpidem Tartrate (Tablet) 30.0 30 10 MG [facility] Commercial Insurance 0 / 0 [place]    1 [id number] ** 03/13/2025 03/13/2025 clonazePAM (Tablet) 60.0 30 0.5 MG [facility] Commercial Insurance 0 / 0 [place]    1 [id number] ** 02/19/2025 02/19/2025 Zolpidem Tartrate (Tablet) 30.0 30 10 MG NA [facility] Commercial Insurance 0 / 0 [place]    1 [id number] ** 02/12/2025 02/12/2025 clonazePAM (Tablet) 60.0 30 0.5 MG NA [facility] Commercial Insurance 0 / 0 [place]    1 [id number] ** 01/23/2025 01/21/2025 Zolpidem Tartrate (Tablet) 30.0 30 10 MG NA [facility] Commercial Insurance 0 / 0 [place]    1 [id number] ** 01/15/2025 01/15/2025 clonazePAM (Tablet) 60.0 30 0.5 MG NA SHALINI VILLEGAS [facility] Commercial Insurance 0 / 0 [place]

## 2025-06-11 ENCOUNTER — TELEPHONE (OUTPATIENT)
Age: 30
End: 2025-06-11

## 2025-06-11 NOTE — TELEPHONE ENCOUNTER
Hello,    Please advise if a forced appointment can be accommodated for the patient:    Call back #: Tyler     Insurance: BC    Reason for appointment: Acute, minimally displaced lateral sesamoid fracture.     Requested doctor and/or location: Dr Dilshad Rosenberg I did not mean to send it to the Dr       Thank you.

## 2025-06-12 DIAGNOSIS — S92.812A CLOSED FRACTURE OF SESAMOID BONE OF LEFT FOOT, INITIAL ENCOUNTER: Primary | ICD-10-CM

## 2025-06-18 DIAGNOSIS — F41.9 ANXIETY: ICD-10-CM

## 2025-06-18 RX ORDER — CLONAZEPAM 0.5 MG/1
0.5 TABLET ORAL 2 TIMES DAILY PRN
Qty: 60 TABLET | Refills: 0 | Status: SHIPPED | OUTPATIENT
Start: 2025-06-18

## 2025-06-18 NOTE — TELEPHONE ENCOUNTER
1 [id number] ** 06/10/2025 06/09/2025 Zolpidem Tartrate (Tablet) 30.0 30 10 MG [facility] Commercial Insurance 0 / 0 [place]    1 [id number] ** 05/21/2025 05/21/2025 clonazePAM (Tablet) 60.0 30 0.5 MG NA [facility] Commercial Insurance 0 / 0 [place]    1 [id number] ** 05/14/2025 05/13/2025 Zolpidem Tartrate (Tablet) 30.0 30 10 MG NA [facility] Commercial Insurance 0 / 0 [place]    1 [id number] ** 04/17/2025 04/17/2025 Zolpidem Tartrate (Tablet) 30.0 30 10 MG NA [facility] Commercial Insurance 0 / 0 [place]    1 [id number] ** 04/10/2025 04/10/2025 clonazePAM (Tablet) 60.0 30 0.5 MG NA [facility] Commercial Insurance 0 / 0 [place]    1 [id number] ** 03/21/2025 03/21/2025 Zolpidem Tartrate (Tablet) 30.0 30 10 MG NA [facility] Commercial Insurance 0 / 0 [place]    1 [id number] ** 03/13/2025 03/13/2025 clonazePAM (Tablet) 60.0 30 0.5 MG NA [facility] Commercial Insurance 0 / 0 [place]    1 [id number] ** 02/19/2025 02/19/2025 Zolpidem Tartrate (Tablet) 30.0 30 10 MG NA [facility] Commercial Insurance 0 / 0 [place]    1 [id number] ** 02/12/2025 02/12/2025 clonazePAM (Tablet) 60.0 30 0.5 MG NA [facility] Commercial Insurance 0 / 0 [place]    1 [id number] ** 01/23/2025 01/21/2025 Zolpidem Tartrate (Tablet) 30.0 30 10 MG NA SHALINI VILLEGAS [facility] Commercial Insurance 0 / 0 [place]

## 2025-07-03 ENCOUNTER — OFFICE VISIT (OUTPATIENT)
Dept: PODIATRY | Facility: CLINIC | Age: 30
End: 2025-07-03
Payer: COMMERCIAL

## 2025-07-03 ENCOUNTER — HOSPITAL ENCOUNTER (OUTPATIENT)
Dept: RADIOLOGY | Facility: HOSPITAL | Age: 30
End: 2025-07-03
Attending: PODIATRIST
Payer: COMMERCIAL

## 2025-07-03 VITALS — HEIGHT: 70 IN | OXYGEN SATURATION: 99 % | BODY MASS INDEX: 19.37 KG/M2 | HEART RATE: 72 BPM

## 2025-07-03 DIAGNOSIS — M79.672 LEFT FOOT PAIN: ICD-10-CM

## 2025-07-03 DIAGNOSIS — S92.812A CLOSED FRACTURE OF SESAMOID BONE OF LEFT FOOT, INITIAL ENCOUNTER: ICD-10-CM

## 2025-07-03 DIAGNOSIS — S92.812A CLOSED FRACTURE OF SESAMOID BONE OF LEFT FOOT, INITIAL ENCOUNTER: Primary | ICD-10-CM

## 2025-07-03 PROCEDURE — 73630 X-RAY EXAM OF FOOT: CPT

## 2025-07-03 PROCEDURE — 99203 OFFICE O/P NEW LOW 30 MIN: CPT | Performed by: PODIATRIST

## 2025-07-07 DIAGNOSIS — G47.00 INSOMNIA, UNSPECIFIED TYPE: ICD-10-CM

## 2025-07-07 RX ORDER — ZOLPIDEM TARTRATE 10 MG/1
10 TABLET ORAL
Qty: 30 TABLET | Refills: 0 | Status: SHIPPED | OUTPATIENT
Start: 2025-07-07

## 2025-07-07 NOTE — TELEPHONE ENCOUNTER
1 [id number] ** 06/18/2025 06/18/2025 clonazePAM (Tablet) 60.0 30 0.5 MG NA [facility] Commercial Insurance 0 / 0 [place]    1 [id number] ** 06/10/2025 06/09/2025 Zolpidem Tartrate (Tablet) 30.0 30 10 MG NA [facility] Commercial Insurance 0 / 0 [place]    1 [id number] ** 05/21/2025 05/21/2025 clonazePAM (Tablet) 60.0 30 0.5 MG NA [facility] Commercial Insurance 0 / 0 [place]    1 [id number] ** 05/14/2025 05/13/2025 Zolpidem Tartrate (Tablet) 30.0 30 10 MG NA [facility] Commercial Insurance 0 / 0 [place]    1 [id number] ** 04/17/2025 04/17/2025 Zolpidem Tartrate (Tablet) 30.0 30 10 MG NA [facility] Commercial Insurance 0 / 0 [place]    1 [id number] ** 04/10/2025 04/10/2025 clonazePAM (Tablet) 60.0 30 0.5 MG NA [facility] Commercial Insurance 0 / 0 [place]    1 [id number] ** 03/21/2025 03/21/2025 Zolpidem Tartrate (Tablet) 30.0 30 10 MG NA [facility] Commercial Insurance 0 / 0 [place]    1 [id number] ** 03/13/2025 03/13/2025 clonazePAM (Tablet) 60.0 30 0.5 MG NA [facility] Commercial Insurance 0 / 0 [place]    1 [id number] ** 02/19/2025 02/19/2025 Zolpidem Tartrate (Tablet) 30.0 30 10 MG NA [facility] Commercial Insurance 0 / 0 [place]    1 [id number] ** 02/12/2025 02/12/2025 clonazePAM (Tablet) 60.0 30 0.5 MG NA SHALINI VILLEGAS [facility] Commercial Insuran

## 2025-07-11 NOTE — ASSESSMENT & PLAN NOTE
Orders:    XR foot 3+ vw left; Future  The patient's clinical examination today is significant for very mild tenderness palpation to the area of the fibular sesamoid of the left foot consistent with a stress reaction.  There is no erythema nor edema to counter ecchymosis noted.  There are no open lesions.  Pedal pulses palpable.    Repeat x-rays of the patient's left foot taken today are personally reviewed and compared to his prior films from June 2025.  There does seem to be some decrease in the lucency of the suspected fracture across the fibular sesamoid since his last x-ray study.    Based on repeat x-rays and the patient's clinical examination, he does seem to be suffering from a fracture of the fibular sesamoid.  His symptoms are relatively mild at this point in time.

## 2025-07-11 NOTE — PROGRESS NOTES
":  Assessment & Plan  Closed fracture of sesamoid bone of left foot, initial encounter    Orders:    XR foot 3+ vw left; Future  The patient's clinical examination today is significant for very mild tenderness palpation to the area of the fibular sesamoid of the left foot consistent with a stress reaction.  There is no erythema nor edema to counter ecchymosis noted.  There are no open lesions.  Pedal pulses palpable.    Repeat x-rays of the patient's left foot taken today are personally reviewed and compared to his prior films from June 2025.  There does seem to be some decrease in the lucency of the suspected fracture across the fibular sesamoid since his last x-ray study.    Based on repeat x-rays and the patient's clinical examination, he does seem to be suffering from a fracture of the fibular sesamoid.  His symptoms are relatively mild at this point in time.  Left foot pain    Orders:    Ambulatory Referral to Podiatry        History of Present Illness     Tyler Galvin is a 30 y.o. male   The patient presents today for his initial consultation with [facility] podiatry group with a chief complaint of a suspected fracture to his left foot.  Onset of pain was relatively sudden.  The patient cannot recall any history of injury or trauma.  He did seek care at the urgent care center where x-rays revealed a suspected fracture of the fibular sesamoid.  He has since been ambulating with guarded weightbearing in a surgical shoe.  His pain is relatively mild at this point in time.      Review of Systems   Constitutional: Negative.    HENT: Negative.     Eyes: Negative.    Respiratory: Negative.     Cardiovascular: Negative.    Endocrine: Negative.    Musculoskeletal: Negative.    Neurological: Negative.    Hematological: Negative.    Psychiatric/Behavioral: Negative.       Objective   Pulse 72   Ht 5' 10\" (1.778 m)   SpO2 99%   BMI 19.37 kg/m²      Physical Exam  Constitutional:       Appearance: Normal appearance. "   HENT:      Head: Normocephalic and atraumatic.     Cardiovascular:      Pulses:           Dorsalis pedis pulses are 2+ on the left side.        Posterior tibial pulses are 2+ on the left side.   Pulmonary:      Effort: Pulmonary effort is normal.     Musculoskeletal:        Feet:    Feet:      Left foot:      Skin integrity: Skin integrity normal.      Comments: The patient's clinical examination today is significant for very mild tenderness palpation to the area of the fibular sesamoid of the left foot consistent with a stress reaction.  There is no erythema nor edema to counter ecchymosis noted.  There are no open lesions.  Pedal pulses palpable.      Skin:     General: Skin is warm and dry.      Capillary Refill: Capillary refill takes less than 2 seconds.     Neurological:      General: No focal deficit present.      Mental Status: He is alert and oriented to person, place, and time.     Psychiatric:         Mood and Affect: Mood normal.

## 2025-08-18 DIAGNOSIS — F41.9 ANXIETY: ICD-10-CM

## 2025-08-18 RX ORDER — CLONAZEPAM 0.5 MG/1
0.5 TABLET ORAL 2 TIMES DAILY PRN
Qty: 60 TABLET | Refills: 0 | Status: SHIPPED | OUTPATIENT
Start: 2025-08-18